# Patient Record
Sex: FEMALE | Race: WHITE | NOT HISPANIC OR LATINO | Employment: OTHER | URBAN - METROPOLITAN AREA
[De-identification: names, ages, dates, MRNs, and addresses within clinical notes are randomized per-mention and may not be internally consistent; named-entity substitution may affect disease eponyms.]

---

## 2023-03-13 DIAGNOSIS — G62.9 NEUROPATHY: Primary | ICD-10-CM

## 2023-03-13 RX ORDER — GABAPENTIN 300 MG/1
300 CAPSULE ORAL 3 TIMES DAILY
COMMUNITY
End: 2023-03-13 | Stop reason: SDUPTHER

## 2023-03-13 RX ORDER — CHLORHEXIDINE GLUCONATE ORAL RINSE 1.2 MG/ML
SOLUTION DENTAL
COMMUNITY
Start: 2022-08-31 | End: 2023-10-04 | Stop reason: ALTCHOICE

## 2023-03-13 RX ORDER — EPINEPHRINE 0.3 MG/.3ML
INJECTION SUBCUTANEOUS
COMMUNITY
End: 2023-09-11 | Stop reason: SDUPTHER

## 2023-03-13 RX ORDER — LOSARTAN POTASSIUM AND HYDROCHLOROTHIAZIDE 25; 100 MG/1; MG/1
1 TABLET ORAL DAILY
COMMUNITY
End: 2023-09-20

## 2023-03-13 RX ORDER — ALBUTEROL SULFATE 90 UG/1
AEROSOL, METERED RESPIRATORY (INHALATION)
COMMUNITY
Start: 2020-11-04 | End: 2023-05-25 | Stop reason: SDUPTHER

## 2023-03-13 RX ORDER — METOPROLOL TARTRATE 25 MG/1
1 TABLET, FILM COATED ORAL 2 TIMES DAILY
COMMUNITY
Start: 2019-01-30 | End: 2023-05-01

## 2023-03-13 RX ORDER — LEVOTHYROXINE SODIUM 125 UG/1
1 TABLET ORAL DAILY
COMMUNITY
Start: 2013-07-19 | End: 2023-09-11 | Stop reason: SDUPTHER

## 2023-03-13 RX ORDER — LOSARTAN POTASSIUM 100 MG/1
1 TABLET ORAL DAILY
COMMUNITY
Start: 2022-07-10 | End: 2023-08-28

## 2023-03-13 RX ORDER — MELOXICAM 15 MG/1
1 TABLET ORAL DAILY
COMMUNITY
Start: 2022-12-26 | End: 2023-08-09

## 2023-03-13 RX ORDER — MINERAL OIL
1 ENEMA (ML) RECTAL DAILY
COMMUNITY
Start: 2014-02-03 | End: 2023-08-28 | Stop reason: SDUPTHER

## 2023-03-13 RX ORDER — PANTOPRAZOLE SODIUM 40 MG/1
40 TABLET, DELAYED RELEASE ORAL DAILY
COMMUNITY
End: 2023-05-25 | Stop reason: SDUPTHER

## 2023-03-13 RX ORDER — MECLIZINE HYDROCHLORIDE 25 MG/1
1 TABLET ORAL 3 TIMES DAILY
COMMUNITY
Start: 2021-09-07 | End: 2024-05-21 | Stop reason: SDUPTHER

## 2023-03-13 RX ORDER — DIAZEPAM 5 MG/1
TABLET ORAL
COMMUNITY
Start: 2022-11-19 | End: 2023-06-19 | Stop reason: ALTCHOICE

## 2023-03-13 RX ORDER — ERENUMAB-AOOE 140 MG/ML
INJECTION, SOLUTION SUBCUTANEOUS
COMMUNITY
Start: 2023-02-16 | End: 2023-10-04 | Stop reason: ALTCHOICE

## 2023-03-13 RX ORDER — ONDANSETRON 4 MG/1
TABLET, ORALLY DISINTEGRATING ORAL
COMMUNITY
Start: 2013-08-08 | End: 2023-10-05 | Stop reason: SDUPTHER

## 2023-03-13 RX ORDER — ERGOCALCIFEROL 1.25 MG/1
1 CAPSULE ORAL 2 TIMES DAILY
COMMUNITY
End: 2023-10-29 | Stop reason: HOSPADM

## 2023-03-13 RX ORDER — DOCUSATE SODIUM 100 MG
1 CAPSULE ORAL 2 TIMES DAILY
COMMUNITY
Start: 2022-03-17 | End: 2023-08-28

## 2023-03-13 RX ORDER — GABAPENTIN 300 MG/1
300 CAPSULE ORAL 3 TIMES DAILY
Qty: 90 CAPSULE | Refills: 0 | Status: SHIPPED | OUTPATIENT
Start: 2023-03-13 | End: 2024-04-17 | Stop reason: ALTCHOICE

## 2023-03-13 RX ORDER — DIPHENHYDRAMINE HCL 25 MG
TABLET ORAL
COMMUNITY
Start: 2015-01-12 | End: 2023-10-29 | Stop reason: HOSPADM

## 2023-05-08 ENCOUNTER — OFFICE VISIT (OUTPATIENT)
Dept: PRIMARY CARE | Facility: CLINIC | Age: 73
End: 2023-05-08
Payer: MEDICARE

## 2023-05-08 ENCOUNTER — LAB (OUTPATIENT)
Dept: LAB | Facility: LAB | Age: 73
End: 2023-05-08
Payer: MEDICARE

## 2023-05-08 VITALS
BODY MASS INDEX: 39.44 KG/M2 | SYSTOLIC BLOOD PRESSURE: 140 MMHG | HEIGHT: 64 IN | WEIGHT: 231 LBS | DIASTOLIC BLOOD PRESSURE: 80 MMHG

## 2023-05-08 DIAGNOSIS — E78.5 HYPERLIPIDEMIA, UNSPECIFIED HYPERLIPIDEMIA TYPE: ICD-10-CM

## 2023-05-08 DIAGNOSIS — E55.9 VITAMIN D DEFICIENCY: ICD-10-CM

## 2023-05-08 DIAGNOSIS — E56.9 VITAMIN DEFICIENCY: ICD-10-CM

## 2023-05-08 DIAGNOSIS — E03.9 HYPOTHYROIDISM, UNSPECIFIED TYPE: ICD-10-CM

## 2023-05-08 DIAGNOSIS — I10 HYPERTENSION, UNSPECIFIED TYPE: ICD-10-CM

## 2023-05-08 PROCEDURE — 82306 VITAMIN D 25 HYDROXY: CPT

## 2023-05-08 PROCEDURE — 99214 OFFICE O/P EST MOD 30 MIN: CPT | Performed by: INTERNAL MEDICINE

## 2023-05-08 PROCEDURE — 3077F SYST BP >= 140 MM HG: CPT | Performed by: INTERNAL MEDICINE

## 2023-05-08 PROCEDURE — 80053 COMPREHEN METABOLIC PANEL: CPT

## 2023-05-08 PROCEDURE — 85025 COMPLETE CBC W/AUTO DIFF WBC: CPT

## 2023-05-08 PROCEDURE — 82607 VITAMIN B-12: CPT

## 2023-05-08 PROCEDURE — 36415 COLL VENOUS BLD VENIPUNCTURE: CPT

## 2023-05-08 PROCEDURE — 85652 RBC SED RATE AUTOMATED: CPT

## 2023-05-08 PROCEDURE — 84443 ASSAY THYROID STIM HORMONE: CPT

## 2023-05-08 PROCEDURE — 3079F DIAST BP 80-89 MM HG: CPT | Performed by: INTERNAL MEDICINE

## 2023-05-08 PROCEDURE — 80061 LIPID PANEL: CPT

## 2023-05-08 NOTE — PROGRESS NOTES
OFFICE NOTE    NAME OF THE PATIENT: Gretta Wilks     YOB: 1950    CHIEF COMPLAINT:  Ms. Wilks today came here for multiple medical issues.  She told me left shoulder pian is not better.  She had a surgery done.  As a sequelae of that she got arthritis, very difficult to cope.  She is in a lot of pain.  She had a right knee surgery.  That was a success.  She wants to have ______ blood work.  She is seeing Dr. Rolle.  She wants me to order ESR.  Appetite and weight are okay.  She came for follow-up.    PAST MEDICAL HISTORY:  Reviewed on EMR, unchanged.    CURRENT MEDICATIONS:  Reviewed on EMR, unchanged.    ALLERGIES:  Reviewed on EMR, unchanged.    SOCIAL HISTORY:  Reviewed on EMR, unchanged.  She does not smoke.    FAMILY HISTORY:  Reviewed on EMR, unchanged.    REVIEW OF SYSTEMS:  All 12 systems reviewed and pertaining covered in history and physical.    PHYSICAL EXAMINATION  VITAL SIGNS:  As recorded and reviewed from EMR.  RESPIRATORY:  The patient had normal inspirations and expirations.  The breath sounds were equal bilaterally and clear to auscultation.  CARDIOVASCULAR:  The patient had S1 normal, split S2 without obvious rubs, clicks, or murmurs.    GASTROINTESTINAL:  There was no hepatosplenomegaly.  There were no palpable masses and no inguinal nodes.  EXTREMITIES:  Legs had no edema.  Left shoulder swelling and tenderness.  NEUROLOGIC:  The patient had normal cranial nerves.  The reflexes, sensory, and motor examination were grossly within normal limits.    LAB WORK:  Laboratory testing discussed.    ASSESSMENT AND PLAN:  Shoulder pain.  She is seeing specialist.  Hypertension.  Kidney okay.  Cholesterol.  Monitor.  Arthritis.  Blood work ordered.  Connective tissue disorder.  ESR ordered.  Follow-up appointment with me in a week after testing.  Breast cancer follow-up.  Referred to Dr. Brunilda Calvo.    Kindly review this note in conjunction with EMR.     Subjective   Patient ID:  "Gretta Wilks is a 73 y.o. female who presents for Follow-up.      HPI    Review of Systems    Objective   /80   Ht 1.626 m (5' 4\")   Wt 105 kg (231 lb)   BMI 39.65 kg/m²       Physical Exam    Assessment/Plan   Problem List Items Addressed This Visit    None        "

## 2023-05-09 LAB
ALANINE AMINOTRANSFERASE (SGPT) (U/L) IN SER/PLAS: 14 U/L (ref 7–45)
ALBUMIN (G/DL) IN SER/PLAS: 3.9 G/DL (ref 3.4–5)
ALKALINE PHOSPHATASE (U/L) IN SER/PLAS: 59 U/L (ref 33–136)
ANION GAP IN SER/PLAS: 11 MMOL/L (ref 10–20)
ASPARTATE AMINOTRANSFERASE (SGOT) (U/L) IN SER/PLAS: 15 U/L (ref 9–39)
BASOPHILS (10*3/UL) IN BLOOD BY AUTOMATED COUNT: 0.07 X10E9/L (ref 0–0.1)
BASOPHILS/100 LEUKOCYTES IN BLOOD BY AUTOMATED COUNT: 0.6 % (ref 0–2)
BILIRUBIN TOTAL (MG/DL) IN SER/PLAS: 0.4 MG/DL (ref 0–1.2)
CALCIDIOL (25 OH VITAMIN D3) (NG/ML) IN SER/PLAS: 65 NG/ML
CALCIUM (MG/DL) IN SER/PLAS: 9.8 MG/DL (ref 8.6–10.6)
CARBON DIOXIDE, TOTAL (MMOL/L) IN SER/PLAS: 31 MMOL/L (ref 21–32)
CHLORIDE (MMOL/L) IN SER/PLAS: 103 MMOL/L (ref 98–107)
CHOLESTEROL (MG/DL) IN SER/PLAS: 241 MG/DL (ref 0–199)
CHOLESTEROL IN HDL (MG/DL) IN SER/PLAS: 42.7 MG/DL
CHOLESTEROL/HDL RATIO: 5.6
COBALAMIN (VITAMIN B12) (PG/ML) IN SER/PLAS: 737 PG/ML (ref 211–911)
CREATININE (MG/DL) IN SER/PLAS: 1.36 MG/DL (ref 0.5–1.05)
EOSINOPHILS (10*3/UL) IN BLOOD BY AUTOMATED COUNT: 0.24 X10E9/L (ref 0–0.4)
EOSINOPHILS/100 LEUKOCYTES IN BLOOD BY AUTOMATED COUNT: 2.1 % (ref 0–6)
ERYTHROCYTE DISTRIBUTION WIDTH (RATIO) BY AUTOMATED COUNT: 12.8 % (ref 11.5–14.5)
ERYTHROCYTE MEAN CORPUSCULAR HEMOGLOBIN CONCENTRATION (G/DL) BY AUTOMATED: 31.6 G/DL (ref 32–36)
ERYTHROCYTE MEAN CORPUSCULAR VOLUME (FL) BY AUTOMATED COUNT: 94 FL (ref 80–100)
ERYTHROCYTES (10*6/UL) IN BLOOD BY AUTOMATED COUNT: 4.29 X10E12/L (ref 4–5.2)
GFR FEMALE: 41 ML/MIN/1.73M2
GLUCOSE (MG/DL) IN SER/PLAS: 103 MG/DL (ref 74–99)
HEMATOCRIT (%) IN BLOOD BY AUTOMATED COUNT: 40.5 % (ref 36–46)
HEMOGLOBIN (G/DL) IN BLOOD: 12.8 G/DL (ref 12–16)
IMMATURE GRANULOCYTES/100 LEUKOCYTES IN BLOOD BY AUTOMATED COUNT: 0.5 % (ref 0–0.9)
LDL: 150 MG/DL (ref 0–99)
LEUKOCYTES (10*3/UL) IN BLOOD BY AUTOMATED COUNT: 11.2 X10E9/L (ref 4.4–11.3)
LYMPHOCYTES (10*3/UL) IN BLOOD BY AUTOMATED COUNT: 2.23 X10E9/L (ref 0.8–3)
LYMPHOCYTES/100 LEUKOCYTES IN BLOOD BY AUTOMATED COUNT: 19.8 % (ref 13–44)
MONOCYTES (10*3/UL) IN BLOOD BY AUTOMATED COUNT: 1.01 X10E9/L (ref 0.05–0.8)
MONOCYTES/100 LEUKOCYTES IN BLOOD BY AUTOMATED COUNT: 9 % (ref 2–10)
NEUTROPHILS (10*3/UL) IN BLOOD BY AUTOMATED COUNT: 7.63 X10E9/L (ref 1.6–5.5)
NEUTROPHILS/100 LEUKOCYTES IN BLOOD BY AUTOMATED COUNT: 68 % (ref 40–80)
NON HDL CHOLESTEROL: 198 MG/DL
NRBC (PER 100 WBCS) BY AUTOMATED COUNT: 0 /100 WBC (ref 0–0)
PLATELETS (10*3/UL) IN BLOOD AUTOMATED COUNT: 414 X10E9/L (ref 150–450)
POTASSIUM (MMOL/L) IN SER/PLAS: 4.1 MMOL/L (ref 3.5–5.3)
PROTEIN TOTAL: 7.1 G/DL (ref 6.4–8.2)
SEDIMENTATION RATE, ERYTHROCYTE: 24 MM/H (ref 0–30)
SODIUM (MMOL/L) IN SER/PLAS: 141 MMOL/L (ref 136–145)
THYROTROPIN (MIU/L) IN SER/PLAS BY DETECTION LIMIT <= 0.05 MIU/L: 0.39 MIU/L (ref 0.44–3.98)
TRIGLYCERIDE (MG/DL) IN SER/PLAS: 242 MG/DL (ref 0–149)
UREA NITROGEN (MG/DL) IN SER/PLAS: 21 MG/DL (ref 6–23)
VLDL: 48 MG/DL (ref 0–40)

## 2023-05-25 ENCOUNTER — OFFICE VISIT (OUTPATIENT)
Dept: PRIMARY CARE | Facility: CLINIC | Age: 73
End: 2023-05-25
Payer: MEDICARE

## 2023-05-25 VITALS
SYSTOLIC BLOOD PRESSURE: 126 MMHG | DIASTOLIC BLOOD PRESSURE: 72 MMHG | WEIGHT: 235 LBS | BODY MASS INDEX: 40.12 KG/M2 | HEIGHT: 64 IN

## 2023-05-25 DIAGNOSIS — R73.03 PREDIABETES: ICD-10-CM

## 2023-05-25 DIAGNOSIS — E78.5 HYPERLIPIDEMIA, UNSPECIFIED HYPERLIPIDEMIA TYPE: ICD-10-CM

## 2023-05-25 DIAGNOSIS — Z87.898 H/O BRAIN TUMOR: ICD-10-CM

## 2023-05-25 DIAGNOSIS — R53.0 NEOPLASTIC MALIGNANT RELATED FATIGUE: ICD-10-CM

## 2023-05-25 DIAGNOSIS — T78.40XA ALLERGY, INITIAL ENCOUNTER: ICD-10-CM

## 2023-05-25 DIAGNOSIS — E03.9 HYPOTHYROIDISM, UNSPECIFIED TYPE: ICD-10-CM

## 2023-05-25 DIAGNOSIS — K21.9 GASTROESOPHAGEAL REFLUX DISEASE WITHOUT ESOPHAGITIS: ICD-10-CM

## 2023-05-25 PROBLEM — H25.13 AGE-RELATED NUCLEAR CATARACT OF BOTH EYES: Status: ACTIVE | Noted: 2023-05-15

## 2023-05-25 PROBLEM — R50.9 FEVER: Status: ACTIVE | Noted: 2023-05-25

## 2023-05-25 PROBLEM — J45.909 EXTRINSIC ASTHMA WITHOUT COMPLICATION (HHS-HCC): Status: ACTIVE | Noted: 2023-05-25

## 2023-05-25 PROBLEM — M25.612 DECREASED ROM OF LEFT SHOULDER: Status: ACTIVE | Noted: 2023-05-25

## 2023-05-25 PROBLEM — M54.9 BACK PAIN: Status: ACTIVE | Noted: 2023-05-25

## 2023-05-25 PROBLEM — S42.402A CLOSED FRACTURE OF LEFT DISTAL HUMERUS: Status: ACTIVE | Noted: 2023-05-25

## 2023-05-25 PROBLEM — B35.1 NAIL FUNGUS: Status: ACTIVE | Noted: 2023-05-25

## 2023-05-25 PROBLEM — R07.9 CHEST PAIN: Status: ACTIVE | Noted: 2023-05-25

## 2023-05-25 PROBLEM — R42 DIZZINESS: Status: ACTIVE | Noted: 2023-05-25

## 2023-05-25 PROBLEM — R60.9 EDEMA: Status: ACTIVE | Noted: 2023-05-25

## 2023-05-25 PROBLEM — I10 PRIMARY HYPERTENSION: Status: ACTIVE | Noted: 2022-03-15

## 2023-05-25 PROBLEM — M25.562 BILATERAL KNEE PAIN: Status: ACTIVE | Noted: 2022-10-17

## 2023-05-25 PROBLEM — G56.02 CARPAL TUNNEL SYNDROME ON LEFT: Status: ACTIVE | Noted: 2022-04-01

## 2023-05-25 PROBLEM — M25.561 BILATERAL KNEE PAIN: Status: ACTIVE | Noted: 2022-10-17

## 2023-05-25 PROBLEM — L03.90 CELLULITIS: Status: ACTIVE | Noted: 2023-05-25

## 2023-05-25 PROBLEM — R11.0 NAUSEA IN ADULT: Status: ACTIVE | Noted: 2023-05-25

## 2023-05-25 PROBLEM — M25.579 ANKLE PAIN: Status: ACTIVE | Noted: 2023-05-25

## 2023-05-25 PROBLEM — G56.32 NEUROPATHY OF LEFT RADIAL NERVE: Status: ACTIVE | Noted: 2023-05-25

## 2023-05-25 PROBLEM — G43.719 CHRONIC MIGRAINE WITHOUT AURA, INTRACTABLE, WITHOUT STATUS MIGRAINOSUS: Status: ACTIVE | Noted: 2021-03-01

## 2023-05-25 PROBLEM — H93.12 TINNITUS OF LEFT EAR: Status: ACTIVE | Noted: 2023-05-25

## 2023-05-25 PROBLEM — M25.622 DECREASED ROM OF LEFT ELBOW: Status: ACTIVE | Noted: 2023-05-25

## 2023-05-25 PROBLEM — S42.209A PROXIMAL HUMERUS FRACTURE: Status: ACTIVE | Noted: 2023-05-25

## 2023-05-25 PROBLEM — E55.9 VITAMIN D DEFICIENCY: Status: ACTIVE | Noted: 2023-05-25

## 2023-05-25 PROBLEM — R26.89 IMBALANCE: Status: ACTIVE | Noted: 2021-03-01

## 2023-05-25 PROBLEM — R20.2 NUMBNESS AND TINGLING IN LEFT HAND: Status: ACTIVE | Noted: 2022-04-01

## 2023-05-25 PROBLEM — R05.9 COUGH: Status: ACTIVE | Noted: 2023-05-25

## 2023-05-25 PROBLEM — S83.519A TORN ACL: Status: ACTIVE | Noted: 2023-05-25

## 2023-05-25 PROBLEM — N60.99 ATYPICAL DUCTAL HYPERPLASIA OF BREAST: Status: ACTIVE | Noted: 2017-02-08

## 2023-05-25 PROBLEM — S69.91XA INJURY OF HAND, RIGHT: Status: ACTIVE | Noted: 2023-05-25

## 2023-05-25 PROBLEM — R26.9 ABNORMALITY OF GAIT: Status: ACTIVE | Noted: 2021-11-22

## 2023-05-25 PROBLEM — M79.89 LEG SWELLING: Status: ACTIVE | Noted: 2023-05-25

## 2023-05-25 PROBLEM — R11.2 PONV (POSTOPERATIVE NAUSEA AND VOMITING): Status: ACTIVE | Noted: 2022-03-17

## 2023-05-25 PROBLEM — F41.9 ANXIETY: Status: ACTIVE | Noted: 2023-05-25

## 2023-05-25 PROBLEM — M19.019 PRIMARY LOCALIZED OSTEOARTHROSIS OF SHOULDER REGION: Status: ACTIVE | Noted: 2023-05-25

## 2023-05-25 PROBLEM — M53.0 CERVICOCRANIAL SYNDROME: Status: ACTIVE | Noted: 2021-03-01

## 2023-05-25 PROBLEM — J20.9 ACUTE BRONCHITIS: Status: ACTIVE | Noted: 2023-05-25

## 2023-05-25 PROBLEM — N32.81 OVERACTIVE BLADDER: Status: ACTIVE | Noted: 2023-05-25

## 2023-05-25 PROBLEM — Z98.890 HISTORY OF LUMPECTOMY OF RIGHT BREAST: Status: ACTIVE | Noted: 2017-02-08

## 2023-05-25 PROBLEM — R35.0 URINE FREQUENCY: Status: ACTIVE | Noted: 2023-05-25

## 2023-05-25 PROBLEM — S42.342A CLOSED DISPLACED SPIRAL FRACTURE OF SHAFT OF LEFT HUMERUS: Status: ACTIVE | Noted: 2023-05-25

## 2023-05-25 PROBLEM — R23.2 HOT FLASHES: Status: ACTIVE | Noted: 2023-05-25

## 2023-05-25 PROBLEM — H66.90 EAR INFECTION: Status: ACTIVE | Noted: 2023-05-25

## 2023-05-25 PROBLEM — R51.9 HEADACHE: Status: ACTIVE | Noted: 2023-05-25

## 2023-05-25 PROBLEM — R92.8 ABNORMAL MAMMOGRAM: Status: ACTIVE | Noted: 2023-05-25

## 2023-05-25 PROBLEM — H93.19 EAR RINGING: Status: ACTIVE | Noted: 2023-05-25

## 2023-05-25 PROBLEM — M25.512 LEFT SHOULDER PAIN: Status: ACTIVE | Noted: 2023-05-25

## 2023-05-25 PROBLEM — G93.9 LESION OF BRAIN: Status: ACTIVE | Noted: 2023-05-25

## 2023-05-25 PROBLEM — M19.041 LOCALIZED PRIMARY OSTEOARTHRITIS OF RIGHT HAND: Status: ACTIVE | Noted: 2023-05-25

## 2023-05-25 PROBLEM — L23.89 ALLERGIC CONTACT DERMATITIS DUE TO OTHER AGENTS: Status: ACTIVE | Noted: 2023-05-25

## 2023-05-25 PROBLEM — G44.319 ACUTE POST-TRAUMATIC HEADACHE, NOT INTRACTABLE: Status: ACTIVE | Noted: 2023-05-25

## 2023-05-25 PROBLEM — R20.0 NUMBNESS AND TINGLING IN LEFT HAND: Status: ACTIVE | Noted: 2022-04-01

## 2023-05-25 PROBLEM — Z98.890 PONV (POSTOPERATIVE NAUSEA AND VOMITING): Status: ACTIVE | Noted: 2022-03-17

## 2023-05-25 PROBLEM — M17.11 ARTHRITIS OF KNEE, RIGHT: Status: ACTIVE | Noted: 2023-05-25

## 2023-05-25 PROBLEM — R06.00 DYSPNEA: Status: ACTIVE | Noted: 2023-05-25

## 2023-05-25 PROBLEM — M72.2 PLANTAR FASCIITIS, LEFT: Status: ACTIVE | Noted: 2023-05-25

## 2023-05-25 PROBLEM — R21 RASH: Status: ACTIVE | Noted: 2023-05-25

## 2023-05-25 PROBLEM — S42.301A FRACTURE OF RIGHT HUMERUS: Status: ACTIVE | Noted: 2023-05-25

## 2023-05-25 PROBLEM — M84.376A METATARSAL STRESS FRACTURE: Status: ACTIVE | Noted: 2023-05-25

## 2023-05-25 PROBLEM — M79.89 ARM SWELLING: Status: ACTIVE | Noted: 2023-05-25

## 2023-05-25 PROBLEM — R53.83 FATIGUE: Status: ACTIVE | Noted: 2023-05-25

## 2023-05-25 PROBLEM — M18.12 ARTHRITIS OF CARPOMETACARPAL (CMC) JOINT OF LEFT THUMB: Status: ACTIVE | Noted: 2023-05-25

## 2023-05-25 PROBLEM — D18.00 HEMANGIOMA: Status: ACTIVE | Noted: 2023-05-25

## 2023-05-25 PROBLEM — N39.0 UTI (URINARY TRACT INFECTION): Status: ACTIVE | Noted: 2023-05-25

## 2023-05-25 PROCEDURE — 3074F SYST BP LT 130 MM HG: CPT | Performed by: INTERNAL MEDICINE

## 2023-05-25 PROCEDURE — 1159F MED LIST DOCD IN RCRD: CPT | Performed by: INTERNAL MEDICINE

## 2023-05-25 PROCEDURE — 99214 OFFICE O/P EST MOD 30 MIN: CPT | Performed by: INTERNAL MEDICINE

## 2023-05-25 PROCEDURE — 3078F DIAST BP <80 MM HG: CPT | Performed by: INTERNAL MEDICINE

## 2023-05-25 RX ORDER — ALBUTEROL SULFATE 90 UG/1
2 AEROSOL, METERED RESPIRATORY (INHALATION)
Qty: 18 G | Refills: 3 | Status: SHIPPED | OUTPATIENT
Start: 2023-05-25 | End: 2024-05-21 | Stop reason: SDUPTHER

## 2023-05-25 RX ORDER — METHYLPREDNISOLONE 4 MG/1
TABLET ORAL
Qty: 21 TABLET | Refills: 0 | Status: SHIPPED | OUTPATIENT
Start: 2023-05-25 | End: 2023-06-01

## 2023-05-25 RX ORDER — PANTOPRAZOLE SODIUM 40 MG/1
40 TABLET, DELAYED RELEASE ORAL DAILY
Qty: 90 TABLET | Refills: 0 | Status: SHIPPED | OUTPATIENT
Start: 2023-05-25 | End: 2023-10-04 | Stop reason: SDUPTHER

## 2023-05-25 NOTE — PROGRESS NOTES
OFFICE NOTE    NAME OF THE PATIENT: Gretta Wilks     YOB: 1950    CHIEF COMPLAINT:  Ms. Glynn today came here for multiple medical issues.  She is very frustrated.  Her arthritis has flared up, a lot of inflammation.  She is looking for new rheumatologist.  Currently, she is on no medication.  She is worried with meningioma and headache.  There is no follow-up for last two years.  Follow-up on other conditions.  Appetite and weight are okay.  No problem.    PAST MEDICAL HISTORY:  Reviewed on EMR, unchanged.    CURRENT MEDICATIONS:  Reviewed on EMR, unchanged.    ALLERGIES:  Reviewed on EMR, unchanged.    SOCIAL HISTORY:  Reviewed on EMR, unchanged.    FAMILY HISTORY:  Reviewed on EMR, unchanged.    REVIEW OF SYSTEMS:  All 12 systems reviewed and pertaining covered in history and physical.    PHYSICAL EXAMINATION  VITAL SIGNS:  As recorded and reviewed from EMR.  RESPIRATORY:  The patient had normal inspirations and expirations.  The breath sounds were equal bilaterally and clear to auscultation.  CARDIOVASCULAR:  The patient had S1 normal, split S2 without obvious rubs, clicks, or murmurs.    GASTROINTESTINAL:  There was no hepatosplenomegaly.  There were no palpable masses and no inguinal nodes.  EXTREMITIES:  Legs had no edema.  NEUROLOGIC:  The patient had normal cranial nerves.  The reflexes, sensory, and motor examination were grossly within normal limits.    LAB WORK:  Laboratory testing discussed.    ASSESSMENT AND PLAN:  Meningioma.  Immediate MRI ordered.  Renal insufficiency.  Monitor.  Rheumatic disease.  Referred to Dr. Sadie Rubin.  Hypertension, okay.  High cholesterol, stable.  Pre-diabetic.  Monitor.  Follow-up in July with repeat blood work.  I offered assistance to get along with Dr. Sadie Rubin for appointment.  She was patient in same practice when Dr Allen was there.  That does help.    Kindly review this note in conjunction with EMR.   Subjective   Patient ID: Gretta Wilks is  "a 73 y.o. female who presents for Follow-up.      HPI    Review of Systems    Objective   /72   Ht 1.626 m (5' 4\")   Wt 107 kg (235 lb)   BMI 40.34 kg/m²       Physical Exam    Assessment/Plan   Problem List Items Addressed This Visit          Digestive    GERD (gastroesophageal reflux disease)       Other    Allergy         "

## 2023-06-19 ENCOUNTER — OFFICE VISIT (OUTPATIENT)
Dept: PRIMARY CARE | Facility: CLINIC | Age: 73
End: 2023-06-19
Payer: MEDICARE

## 2023-06-19 VITALS
HEIGHT: 66 IN | BODY MASS INDEX: 37.77 KG/M2 | WEIGHT: 235 LBS | SYSTOLIC BLOOD PRESSURE: 124 MMHG | DIASTOLIC BLOOD PRESSURE: 66 MMHG

## 2023-06-19 DIAGNOSIS — N63.0 BREAST MASS IN FEMALE: Primary | ICD-10-CM

## 2023-06-19 DIAGNOSIS — Z12.31 ENCOUNTER FOR SCREENING MAMMOGRAM FOR BREAST CANCER: ICD-10-CM

## 2023-06-19 DIAGNOSIS — R21 RASH: ICD-10-CM

## 2023-06-19 PROBLEM — S99.929A PLANTAR PLATE INJURY: Status: ACTIVE | Noted: 2023-06-19

## 2023-06-19 PROBLEM — M17.12 ARTHRITIS OF KNEE, LEFT: Status: ACTIVE | Noted: 2023-06-19

## 2023-06-19 PROBLEM — M54.9 MID BACK PAIN: Status: ACTIVE | Noted: 2023-06-19

## 2023-06-19 PROBLEM — R53.1 FEELING WEAK: Status: ACTIVE | Noted: 2023-06-19

## 2023-06-19 PROBLEM — M89.8X2 PAIN OF LEFT HUMERUS: Status: ACTIVE | Noted: 2023-06-19

## 2023-06-19 PROBLEM — M20.5X9 CLAWTOE, ACQUIRED: Status: ACTIVE | Noted: 2023-06-19

## 2023-06-19 PROBLEM — B37.9 YEAST INFECTION: Status: ACTIVE | Noted: 2023-06-19

## 2023-06-19 PROBLEM — M25.561 CHRONIC PAIN OF RIGHT KNEE: Status: ACTIVE | Noted: 2023-06-19

## 2023-06-19 PROBLEM — M79.642 PAIN OF LEFT HAND: Status: ACTIVE | Noted: 2023-06-19

## 2023-06-19 PROBLEM — I10 HYPERTENSION: Status: ACTIVE | Noted: 2023-06-19

## 2023-06-19 PROBLEM — M54.50 LOWER BACK PAIN: Status: ACTIVE | Noted: 2023-06-19

## 2023-06-19 PROBLEM — I80.9 PHLEBITIS: Status: ACTIVE | Noted: 2023-06-19

## 2023-06-19 PROBLEM — G43.109 MIGRAINE WITH AURA AND WITHOUT STATUS MIGRAINOSUS, NOT INTRACTABLE: Status: ACTIVE | Noted: 2023-06-19

## 2023-06-19 PROBLEM — M19.049 HAND ARTHRITIS: Status: ACTIVE | Noted: 2023-06-19

## 2023-06-19 PROBLEM — M79.673 FOOT PAIN: Status: ACTIVE | Noted: 2023-06-19

## 2023-06-19 PROBLEM — J45.909 ASTHMA (HHS-HCC): Status: ACTIVE | Noted: 2023-06-19

## 2023-06-19 PROBLEM — G56.02 CARPAL TUNNEL SYNDROME OF LEFT WRIST: Status: ACTIVE | Noted: 2023-06-19

## 2023-06-19 PROBLEM — B99.9 ACUTE INFECTION: Status: ACTIVE | Noted: 2023-06-19

## 2023-06-19 PROBLEM — R39.9 UTI SYMPTOMS: Status: ACTIVE | Noted: 2023-06-19

## 2023-06-19 PROBLEM — M79.604 RIGHT LEG PAIN: Status: ACTIVE | Noted: 2023-06-19

## 2023-06-19 PROBLEM — G89.29 CHRONIC PAIN OF RIGHT KNEE: Status: ACTIVE | Noted: 2023-06-19

## 2023-06-19 PROBLEM — M79.603 UPPER EXTREMITY PAIN: Status: ACTIVE | Noted: 2023-06-19

## 2023-06-19 PROBLEM — R22.1 NECK SWELLING: Status: ACTIVE | Noted: 2023-06-19

## 2023-06-19 PROBLEM — H43.811 VITREOUS DETACHMENT OF RIGHT EYE: Status: ACTIVE | Noted: 2023-05-15

## 2023-06-19 PROBLEM — M54.2 NECK PAIN: Status: ACTIVE | Noted: 2021-03-01

## 2023-06-19 PROCEDURE — 3078F DIAST BP <80 MM HG: CPT | Performed by: INTERNAL MEDICINE

## 2023-06-19 PROCEDURE — 1036F TOBACCO NON-USER: CPT | Performed by: INTERNAL MEDICINE

## 2023-06-19 PROCEDURE — 3008F BODY MASS INDEX DOCD: CPT | Performed by: INTERNAL MEDICINE

## 2023-06-19 PROCEDURE — 99213 OFFICE O/P EST LOW 20 MIN: CPT | Performed by: INTERNAL MEDICINE

## 2023-06-19 PROCEDURE — 3074F SYST BP LT 130 MM HG: CPT | Performed by: INTERNAL MEDICINE

## 2023-06-19 PROCEDURE — 1159F MED LIST DOCD IN RCRD: CPT | Performed by: INTERNAL MEDICINE

## 2023-06-19 RX ORDER — DOXYCYCLINE 100 MG/1
100 CAPSULE ORAL DAILY
Qty: 30 CAPSULE | Refills: 0 | Status: SHIPPED | OUTPATIENT
Start: 2023-06-19 | End: 2023-07-19

## 2023-06-19 NOTE — PROGRESS NOTES
"Subjective   Patient ID: Gretta Wilks is a 73 y.o. female who presents for Follow-up and Med Refill.    Med Refill    Patient is here concerned about having mass in the right wrist.  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  She has finished 5-year monitoring and now she is worried about noticing the mass in the right breast  She also has rash in the neck and sternal area    Review of Systems    Objective   /66   Ht 1.676 m (5' 6\")   Wt 107 kg (235 lb)   BMI 37.93 kg/m²     Physical Exam  Vitals reviewed.   Constitutional:       Appearance: Normal appearance.   HENT:      Head: Normocephalic and atraumatic.      Right Ear: Tympanic membrane, ear canal and external ear normal.      Left Ear: Tympanic membrane, ear canal and external ear normal.      Nose: Nose normal.      Mouth/Throat:      Pharynx: Oropharynx is clear.   Eyes:      Extraocular Movements: Extraocular movements intact.      Conjunctiva/sclera: Conjunctivae normal.      Pupils: Pupils are equal, round, and reactive to light.   Cardiovascular:      Rate and Rhythm: Normal rate and regular rhythm.      Pulses: Normal pulses.      Heart sounds: Normal heart sounds.   Pulmonary:      Effort: Pulmonary effort is normal.      Breath sounds: Normal breath sounds.   Abdominal:      General: Abdomen is flat. Bowel sounds are normal.      Palpations: Abdomen is soft.   Musculoskeletal:      Cervical back: Normal range of motion and neck supple.   Skin:     General: Skin is warm and dry.      Findings: Rash present.   Neurological:      General: No focal deficit present.      Mental Status: She is alert and oriented to person, place, and time.   Psychiatric:         Mood and Affect: Mood normal.         Assessment/Plan   Problem List Items Addressed This Visit          Other    Rash    Relevant Medications    doxycycline (Vibramycin) 100 mg capsule     Other Visit Diagnoses       Encounter for screening mammogram " for breast cancer        Relevant Orders    BI mammo bilateral screening tomosynthesis        Mass felt in the right breast most likely scar  We will get diagnostic mammogram  Folliculitis in the neck area we will treat with doxycycline

## 2023-06-25 DIAGNOSIS — I10 PRIMARY HYPERTENSION: ICD-10-CM

## 2023-06-26 RX ORDER — METOPROLOL TARTRATE 25 MG/1
TABLET, FILM COATED ORAL
Qty: 180 TABLET | Refills: 3 | Status: SHIPPED | OUTPATIENT
Start: 2023-06-26

## 2023-07-05 ENCOUNTER — TELEPHONE (OUTPATIENT)
Dept: PRIMARY CARE | Facility: CLINIC | Age: 73
End: 2023-07-05
Payer: MEDICARE

## 2023-08-09 ENCOUNTER — OFFICE VISIT (OUTPATIENT)
Dept: PRIMARY CARE | Facility: CLINIC | Age: 73
End: 2023-08-09
Payer: MEDICARE

## 2023-08-09 VITALS
HEIGHT: 66 IN | DIASTOLIC BLOOD PRESSURE: 76 MMHG | SYSTOLIC BLOOD PRESSURE: 128 MMHG | BODY MASS INDEX: 37.45 KG/M2 | WEIGHT: 233 LBS

## 2023-08-09 DIAGNOSIS — R05.9 COUGH, UNSPECIFIED TYPE: ICD-10-CM

## 2023-08-09 PROCEDURE — 99214 OFFICE O/P EST MOD 30 MIN: CPT | Performed by: INTERNAL MEDICINE

## 2023-08-09 PROCEDURE — 1159F MED LIST DOCD IN RCRD: CPT | Performed by: INTERNAL MEDICINE

## 2023-08-09 PROCEDURE — 3008F BODY MASS INDEX DOCD: CPT | Performed by: INTERNAL MEDICINE

## 2023-08-09 PROCEDURE — 1036F TOBACCO NON-USER: CPT | Performed by: INTERNAL MEDICINE

## 2023-08-09 PROCEDURE — 3074F SYST BP LT 130 MM HG: CPT | Performed by: INTERNAL MEDICINE

## 2023-08-09 PROCEDURE — 3078F DIAST BP <80 MM HG: CPT | Performed by: INTERNAL MEDICINE

## 2023-08-09 PROCEDURE — 1125F AMNT PAIN NOTED PAIN PRSNT: CPT | Performed by: INTERNAL MEDICINE

## 2023-08-09 RX ORDER — DOXYCYCLINE 100 MG/1
100 CAPSULE ORAL 2 TIMES DAILY
Qty: 20 CAPSULE | Refills: 0 | Status: SHIPPED | OUTPATIENT
Start: 2023-08-09 | End: 2023-08-19

## 2023-08-09 RX ORDER — MONTELUKAST SODIUM 10 MG/1
10 TABLET ORAL NIGHTLY
Qty: 30 TABLET | Refills: 5 | Status: SHIPPED | OUTPATIENT
Start: 2023-08-09 | End: 2023-08-28 | Stop reason: SDUPTHER

## 2023-08-09 RX ORDER — BUDESONIDE AND FORMOTEROL FUMARATE DIHYDRATE 80; 4.5 UG/1; UG/1
2 AEROSOL RESPIRATORY (INHALATION)
Qty: 6.9 G | Refills: 0 | Status: SHIPPED | OUTPATIENT
Start: 2023-08-09 | End: 2023-10-29 | Stop reason: HOSPADM

## 2023-08-09 RX ORDER — PREDNISONE 10 MG/1
TABLET ORAL
Qty: 42 TABLET | Refills: 0 | Status: SHIPPED | OUTPATIENT
Start: 2023-08-09 | End: 2023-08-21

## 2023-08-09 RX ORDER — LORATADINE 10 MG/1
10 TABLET ORAL DAILY
Qty: 30 TABLET | Refills: 2 | Status: SHIPPED | OUTPATIENT
Start: 2023-08-09 | End: 2023-10-29 | Stop reason: HOSPADM

## 2023-08-09 ASSESSMENT — ENCOUNTER SYMPTOMS
LOSS OF SENSATION IN FEET: 0
OCCASIONAL FEELINGS OF UNSTEADINESS: 0
DEPRESSION: 0
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
## 2023-08-15 ENCOUNTER — LAB (OUTPATIENT)
Dept: LAB | Facility: LAB | Age: 73
End: 2023-08-15
Payer: MEDICARE

## 2023-08-15 DIAGNOSIS — E03.9 HYPOTHYROIDISM, UNSPECIFIED TYPE: ICD-10-CM

## 2023-08-15 DIAGNOSIS — R53.0 NEOPLASTIC MALIGNANT RELATED FATIGUE: ICD-10-CM

## 2023-08-15 DIAGNOSIS — E78.5 HYPERLIPIDEMIA, UNSPECIFIED HYPERLIPIDEMIA TYPE: ICD-10-CM

## 2023-08-15 DIAGNOSIS — R73.03 PREDIABETES: ICD-10-CM

## 2023-08-15 LAB
ALANINE AMINOTRANSFERASE (SGPT) (U/L) IN SER/PLAS: 14 U/L (ref 7–45)
ALBUMIN (G/DL) IN SER/PLAS: 3.8 G/DL (ref 3.4–5)
ALKALINE PHOSPHATASE (U/L) IN SER/PLAS: 55 U/L (ref 33–136)
ANION GAP IN SER/PLAS: 13 MMOL/L (ref 10–20)
ASPARTATE AMINOTRANSFERASE (SGOT) (U/L) IN SER/PLAS: 12 U/L (ref 9–39)
BASOPHILS (10*3/UL) IN BLOOD BY AUTOMATED COUNT: 0.12 X10E9/L (ref 0–0.1)
BASOPHILS/100 LEUKOCYTES IN BLOOD BY AUTOMATED COUNT: 0.8 % (ref 0–2)
BILIRUBIN TOTAL (MG/DL) IN SER/PLAS: 0.4 MG/DL (ref 0–1.2)
CALCIUM (MG/DL) IN SER/PLAS: 9.4 MG/DL (ref 8.6–10.6)
CARBON DIOXIDE, TOTAL (MMOL/L) IN SER/PLAS: 29 MMOL/L (ref 21–32)
CHLORIDE (MMOL/L) IN SER/PLAS: 102 MMOL/L (ref 98–107)
CHOLESTEROL (MG/DL) IN SER/PLAS: 210 MG/DL (ref 0–199)
CHOLESTEROL IN HDL (MG/DL) IN SER/PLAS: 49.9 MG/DL
CHOLESTEROL/HDL RATIO: 4.2
CREATININE (MG/DL) IN SER/PLAS: 1.35 MG/DL (ref 0.5–1.05)
EOSINOPHILS (10*3/UL) IN BLOOD BY AUTOMATED COUNT: 0.27 X10E9/L (ref 0–0.4)
EOSINOPHILS/100 LEUKOCYTES IN BLOOD BY AUTOMATED COUNT: 1.8 % (ref 0–6)
ERYTHROCYTE DISTRIBUTION WIDTH (RATIO) BY AUTOMATED COUNT: 12.2 % (ref 11.5–14.5)
ERYTHROCYTE MEAN CORPUSCULAR HEMOGLOBIN CONCENTRATION (G/DL) BY AUTOMATED: 31.3 G/DL (ref 32–36)
ERYTHROCYTE MEAN CORPUSCULAR VOLUME (FL) BY AUTOMATED COUNT: 95 FL (ref 80–100)
ERYTHROCYTES (10*6/UL) IN BLOOD BY AUTOMATED COUNT: 4.38 X10E12/L (ref 4–5.2)
ESTIMATED AVERAGE GLUCOSE FOR HBA1C: 111 MG/DL
GFR FEMALE: 42 ML/MIN/1.73M2
GLUCOSE (MG/DL) IN SER/PLAS: 87 MG/DL (ref 74–99)
HEMATOCRIT (%) IN BLOOD BY AUTOMATED COUNT: 41.5 % (ref 36–46)
HEMOGLOBIN (G/DL) IN BLOOD: 13 G/DL (ref 12–16)
HEMOGLOBIN A1C/HEMOGLOBIN TOTAL IN BLOOD: 5.5 %
IMMATURE GRANULOCYTES/100 LEUKOCYTES IN BLOOD BY AUTOMATED COUNT: 0.4 % (ref 0–0.9)
LDL: 138 MG/DL (ref 0–99)
LEUKOCYTES (10*3/UL) IN BLOOD BY AUTOMATED COUNT: 15.4 X10E9/L (ref 4.4–11.3)
LYMPHOCYTES (10*3/UL) IN BLOOD BY AUTOMATED COUNT: 4.9 X10E9/L (ref 0.8–3)
LYMPHOCYTES/100 LEUKOCYTES IN BLOOD BY AUTOMATED COUNT: 31.8 % (ref 13–44)
MONOCYTES (10*3/UL) IN BLOOD BY AUTOMATED COUNT: 1.21 X10E9/L (ref 0.05–0.8)
MONOCYTES/100 LEUKOCYTES IN BLOOD BY AUTOMATED COUNT: 7.9 % (ref 2–10)
NEUTROPHILS (10*3/UL) IN BLOOD BY AUTOMATED COUNT: 8.84 X10E9/L (ref 1.6–5.5)
NEUTROPHILS/100 LEUKOCYTES IN BLOOD BY AUTOMATED COUNT: 57.3 % (ref 40–80)
NRBC (PER 100 WBCS) BY AUTOMATED COUNT: 0 /100 WBC (ref 0–0)
PLATELETS (10*3/UL) IN BLOOD AUTOMATED COUNT: 413 X10E9/L (ref 150–450)
POTASSIUM (MMOL/L) IN SER/PLAS: 4.2 MMOL/L (ref 3.5–5.3)
PROTEIN TOTAL: 7.1 G/DL (ref 6.4–8.2)
SODIUM (MMOL/L) IN SER/PLAS: 140 MMOL/L (ref 136–145)
THYROTROPIN (MIU/L) IN SER/PLAS BY DETECTION LIMIT <= 0.05 MIU/L: 1.7 MIU/L (ref 0.44–3.98)
TRIGLYCERIDE (MG/DL) IN SER/PLAS: 112 MG/DL (ref 0–149)
UREA NITROGEN (MG/DL) IN SER/PLAS: 30 MG/DL (ref 6–23)
VLDL: 22 MG/DL (ref 0–40)

## 2023-08-15 PROCEDURE — 80053 COMPREHEN METABOLIC PANEL: CPT

## 2023-08-15 PROCEDURE — 83036 HEMOGLOBIN GLYCOSYLATED A1C: CPT

## 2023-08-15 PROCEDURE — 80061 LIPID PANEL: CPT

## 2023-08-15 PROCEDURE — 85025 COMPLETE CBC W/AUTO DIFF WBC: CPT

## 2023-08-15 PROCEDURE — 84443 ASSAY THYROID STIM HORMONE: CPT

## 2023-08-15 PROCEDURE — 36415 COLL VENOUS BLD VENIPUNCTURE: CPT

## 2023-08-17 ENCOUNTER — TELEPHONE (OUTPATIENT)
Dept: PRIMARY CARE | Facility: CLINIC | Age: 73
End: 2023-08-17
Payer: MEDICARE

## 2023-08-17 DIAGNOSIS — R05.9 COUGH, UNSPECIFIED TYPE: ICD-10-CM

## 2023-08-17 RX ORDER — MOMETASONE FUROATE AND FORMOTEROL FUMARATE DIHYDRATE 100; 5 UG/1; UG/1
2 AEROSOL RESPIRATORY (INHALATION)
Qty: 13 G | Refills: 5 | Status: SHIPPED | OUTPATIENT
Start: 2023-08-17 | End: 2023-10-04 | Stop reason: ALTCHOICE

## 2023-08-17 NOTE — TELEPHONE ENCOUNTER
----- Message from Angela Bañuelos MA sent at 8/17/2023 11:24 AM EDT -----  I called this pharmacy about the prednisone and they said the symbicort was not o]covered by insurance but dulera is. Do you want to send in dulera or do a pa for the symbicort?

## 2023-08-28 ENCOUNTER — OFFICE VISIT (OUTPATIENT)
Dept: PRIMARY CARE | Facility: CLINIC | Age: 73
End: 2023-08-28
Payer: MEDICARE

## 2023-08-28 VITALS
SYSTOLIC BLOOD PRESSURE: 128 MMHG | WEIGHT: 233 LBS | BODY MASS INDEX: 37.45 KG/M2 | HEIGHT: 66 IN | DIASTOLIC BLOOD PRESSURE: 76 MMHG

## 2023-08-28 DIAGNOSIS — R05.9 COUGH, UNSPECIFIED TYPE: ICD-10-CM

## 2023-08-28 DIAGNOSIS — I10 PRIMARY HYPERTENSION: ICD-10-CM

## 2023-08-28 DIAGNOSIS — J40 BRONCHITIS: ICD-10-CM

## 2023-08-28 PROCEDURE — 99214 OFFICE O/P EST MOD 30 MIN: CPT | Performed by: INTERNAL MEDICINE

## 2023-08-28 PROCEDURE — 3008F BODY MASS INDEX DOCD: CPT | Performed by: INTERNAL MEDICINE

## 2023-08-28 PROCEDURE — 1125F AMNT PAIN NOTED PAIN PRSNT: CPT | Performed by: INTERNAL MEDICINE

## 2023-08-28 PROCEDURE — 1036F TOBACCO NON-USER: CPT | Performed by: INTERNAL MEDICINE

## 2023-08-28 PROCEDURE — 1159F MED LIST DOCD IN RCRD: CPT | Performed by: INTERNAL MEDICINE

## 2023-08-28 PROCEDURE — 3078F DIAST BP <80 MM HG: CPT | Performed by: INTERNAL MEDICINE

## 2023-08-28 PROCEDURE — 3074F SYST BP LT 130 MM HG: CPT | Performed by: INTERNAL MEDICINE

## 2023-08-28 RX ORDER — MONTELUKAST SODIUM 10 MG/1
10 TABLET ORAL NIGHTLY
Qty: 30 TABLET | Refills: 3 | Status: SHIPPED | OUTPATIENT
Start: 2023-08-28 | End: 2023-09-11 | Stop reason: SDUPTHER

## 2023-08-28 RX ORDER — MINERAL OIL
180 ENEMA (ML) RECTAL DAILY
Qty: 30 TABLET | Refills: 3 | Status: SHIPPED | OUTPATIENT
Start: 2023-08-28 | End: 2024-05-21 | Stop reason: WASHOUT

## 2023-08-28 RX ORDER — PREDNISONE 10 MG/1
TABLET ORAL
Qty: 42 TABLET | Refills: 0 | Status: SHIPPED | OUTPATIENT
Start: 2023-08-28 | End: 2023-09-09

## 2023-08-28 RX ORDER — AMLODIPINE BESYLATE 5 MG/1
5 TABLET ORAL DAILY
Qty: 30 TABLET | Refills: 5 | Status: SHIPPED | OUTPATIENT
Start: 2023-08-28 | End: 2023-10-04 | Stop reason: ALTCHOICE

## 2023-08-28 RX ORDER — CYCLOBENZAPRINE HCL 10 MG
10 TABLET ORAL 2 TIMES DAILY PRN
Qty: 60 TABLET | Refills: 2 | Status: CANCELLED | OUTPATIENT
Start: 2023-08-28

## 2023-08-28 ASSESSMENT — ENCOUNTER SYMPTOMS
OCCASIONAL FEELINGS OF UNSTEADINESS: 0
LOSS OF SENSATION IN FEET: 0
DEPRESSION: 0

## 2023-08-28 NOTE — PROGRESS NOTES
OFFICE NOTE    NAME OF THE PATIENT: Gretta Wilks    YOB: 1950    CHIEF COMPLAINT:   Ms. Glynn has chronic cough, congestion, shortness of breath, wheezing, not better.  She is still having symptoms.  She is not happy.  She took doxycycline, no improvement.  She is getting angioedema.  She constantly has allergies until January.  She is worried about angioedema.  She knows how to treat it.  It is a significant problem all the time.  Congestion and cough, not feeling good.    PAST MEDICAL HISTORY:  Reviewed on EMR, unchanged.    CURRENT MEDICATIONS:  Reviewed on EMR, unchanged.  List reviewed.    ALLERGIES:  Reviewed on EMR, unchanged.    SOCIAL HISTORY:  Reviewed on EMR, unchanged.  She does not smoke and does not drink alcohol.    FAMILY HISTORY:  Reviewed on EMR, unchanged.    REVIEW OF SYSTEMS:  All 12 systems reviewed and pertaining covered in history and physical.    PHYSICAL EXAMINATION  VITAL SIGNS:  As recorded and reviewed from EMR.  EYES:  The patient's sclerae white.  The pupils were equal and round.  ENT:  Nose and throat congested.  Postnasal drip.  RESPIRATORY: Wheezing present.  ______.  CARDIOVASCULAR:  The patient had S1 normal, split S2 without obvious rubs, clicks, or murmurs.    GASTROINTESTINAL:  There was no hepatosplenomegaly.  There were no palpable masses and no inguinal nodes.  EXTREMITIES:  Legs had no edema.  NEUROLOGIC:  The patient had normal cranial nerves.  The reflexes, sensory, and motor examination were grossly within normal limits.    LAB WORK: Laboratory testing discussed.    ASSESSMENT AND PLAN:  Bronchitis, and cough condition.  No improvement.  Chest x-ray, Z-SAMANTA, continue inhaler.  Question of angioedema.  Prednisone, Singulair, Allegra.  Hypertension.  Losartan could be contributing to angioedema, though Dr. Gibson got rid of losartan and hydrochlorothiazide, but we are going to get rid of losartan completely.  Instead for blood pressure we will try  "Norvasc 5 mg.  Leg edema, it can happen, explained to her.  Cardiac.  Ejection fraction of 55%.  ______ okay.  ______ okay.  Follow-up appointment with me in two weeks.  I will check repeated kidney function and chronic renal failure.  I think losartan and hydrochlorothiazide are contributing to it.  I will just keep an eye on it.    Kindly review this note in conjunction with EMR.   Subjective   Patient ID: Gretta Wilks is a 73 y.o. female who presents for Follow-up.      HPI    Review of Systems    Objective   /76   Ht 1.676 m (5' 6\")   Wt 106 kg (233 lb)   BMI 37.61 kg/m²       Physical Exam    Assessment/Plan   Problem List Items Addressed This Visit    None        "

## 2023-08-31 DIAGNOSIS — J40 BRONCHITIS: ICD-10-CM

## 2023-08-31 RX ORDER — AZITHROMYCIN 250 MG/1
TABLET, FILM COATED ORAL
Qty: 6 TABLET | Refills: 0 | Status: SHIPPED | OUTPATIENT
Start: 2023-08-31 | End: 2023-09-05

## 2023-09-01 PROBLEM — R05.9 COUGH: Status: RESOLVED | Noted: 2023-05-25 | Resolved: 2023-09-01

## 2023-09-01 PROBLEM — M79.604 RIGHT LEG PAIN: Status: RESOLVED | Noted: 2023-06-19 | Resolved: 2023-09-01

## 2023-09-01 PROBLEM — R50.9 FEVER: Status: RESOLVED | Noted: 2023-05-25 | Resolved: 2023-09-01

## 2023-09-01 PROBLEM — M17.0 PRIMARY OSTEOARTHRITIS OF KNEES, BILATERAL: Status: ACTIVE | Noted: 2023-09-01

## 2023-09-01 PROBLEM — M35.9 AUTOIMMUNE DISEASE (MULTI): Status: ACTIVE | Noted: 2023-09-01

## 2023-09-01 PROBLEM — M35.01 SJOGREN SYNDROME WITH KERATOCONJUNCTIVITIS (MULTI): Status: ACTIVE | Noted: 2023-09-01

## 2023-09-01 PROBLEM — J20.9 ACUTE BRONCHITIS: Status: RESOLVED | Noted: 2023-05-25 | Resolved: 2023-09-01

## 2023-09-01 PROBLEM — M65.9 TENOSYNOVITIS OF RIGHT HAND: Status: ACTIVE | Noted: 2023-09-01

## 2023-09-01 PROBLEM — N39.0 UTI (URINARY TRACT INFECTION): Status: RESOLVED | Noted: 2023-05-25 | Resolved: 2023-09-01

## 2023-09-01 PROBLEM — I73.9 CLAUDICATION (CMS-HCC): Status: ACTIVE | Noted: 2023-09-01

## 2023-09-01 PROBLEM — G44.319 ACUTE POST-TRAUMATIC HEADACHE, NOT INTRACTABLE: Status: RESOLVED | Noted: 2023-05-25 | Resolved: 2023-09-01
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
## 2023-09-01 PROBLEM — Z79.899 MEDICATION MANAGEMENT: Status: ACTIVE | Noted: 2023-09-01

## 2023-09-01 PROBLEM — E66.812 CLASS 2 OBESITY WITH BODY MASS INDEX (BMI) OF 36.0 TO 36.9 IN ADULT: Status: ACTIVE | Noted: 2023-09-01

## 2023-09-01 PROBLEM — M54.9 BACK PAIN: Status: RESOLVED | Noted: 2023-05-25 | Resolved: 2023-09-01

## 2023-09-01 PROBLEM — M79.642 PAIN OF LEFT HAND: Status: RESOLVED | Noted: 2023-06-19 | Resolved: 2023-09-01

## 2023-09-01 PROBLEM — Z78.0 POST-MENOPAUSAL: Status: ACTIVE | Noted: 2023-09-01

## 2023-09-01 PROBLEM — M79.673 FOOT PAIN: Status: RESOLVED | Noted: 2023-06-19 | Resolved: 2023-09-01

## 2023-09-01 PROBLEM — M79.7 FIBROMYALGIA SYNDROME: Status: ACTIVE | Noted: 2023-09-01

## 2023-09-01 PROBLEM — M25.512 LEFT SHOULDER PAIN: Status: RESOLVED | Noted: 2023-05-25 | Resolved: 2023-09-01

## 2023-09-01 PROBLEM — E06.3 AUTOIMMUNE THYROIDITIS: Status: ACTIVE | Noted: 2023-09-01

## 2023-09-01 PROBLEM — M25.561 BILATERAL KNEE PAIN: Status: RESOLVED | Noted: 2022-10-17 | Resolved: 2023-09-01

## 2023-09-01 PROBLEM — L03.119 CELLULITIS AND ABSCESS OF LOWER EXTREMITY: Status: ACTIVE | Noted: 2023-09-01

## 2023-09-01 PROBLEM — M54.50 LOWER BACK PAIN: Status: RESOLVED | Noted: 2023-06-19 | Resolved: 2023-09-01

## 2023-09-01 PROBLEM — R26.81 UNSTEADY GAIT: Status: ACTIVE | Noted: 2023-09-01

## 2023-09-01 PROBLEM — M17.11 PRIMARY OSTEOARTHRITIS OF RIGHT KNEE: Status: ACTIVE | Noted: 2023-09-01

## 2023-09-01 PROBLEM — M24.612: Status: ACTIVE | Noted: 2023-09-01

## 2023-09-01 PROBLEM — Z86.2 HISTORY OF PERNICIOUS ANEMIA: Status: ACTIVE | Noted: 2023-09-01

## 2023-09-01 PROBLEM — M47.812 CERVICAL SPONDYLOSIS: Status: ACTIVE | Noted: 2023-09-01

## 2023-09-01 PROBLEM — M54.9 MID BACK PAIN: Status: RESOLVED | Noted: 2023-06-19 | Resolved: 2023-09-01

## 2023-09-01 PROBLEM — M65.941 TENOSYNOVITIS OF RIGHT HAND: Status: ACTIVE | Noted: 2023-09-01

## 2023-09-01 PROBLEM — M54.2 NECK PAIN: Status: RESOLVED | Noted: 2021-03-01 | Resolved: 2023-09-01

## 2023-09-01 PROBLEM — L02.419 CELLULITIS AND ABSCESS OF LOWER EXTREMITY: Status: ACTIVE | Noted: 2023-09-01

## 2023-09-01 PROBLEM — H66.90 EAR INFECTION: Status: RESOLVED | Noted: 2023-05-25 | Resolved: 2023-09-01

## 2023-09-01 PROBLEM — R60.0 EDEMA OF UPPER EXTREMITY: Status: ACTIVE | Noted: 2023-05-25

## 2023-09-01 PROBLEM — M25.562 BILATERAL KNEE PAIN: Status: RESOLVED | Noted: 2022-10-17 | Resolved: 2023-09-01

## 2023-09-01 PROBLEM — B99.9 ACUTE INFECTION: Status: RESOLVED | Noted: 2023-06-19 | Resolved: 2023-09-01

## 2023-09-01 PROBLEM — M15.0 PRIMARY GENERALIZED HYPERTROPHIC OSTEOARTHROSIS: Status: ACTIVE | Noted: 2023-09-01

## 2023-09-01 PROBLEM — E66.9 CLASS 2 OBESITY WITH BODY MASS INDEX (BMI) OF 36.0 TO 36.9 IN ADULT: Status: ACTIVE | Noted: 2023-09-01

## 2023-09-01 PROBLEM — M79.89 ARM SWELLING: Status: ACTIVE | Noted: 2023-09-01

## 2023-09-01 PROBLEM — M19.042 PRIMARY OSTEOARTHRITIS, LEFT HAND: Status: ACTIVE | Noted: 2023-09-01

## 2023-09-01 PROBLEM — R51.9 HEADACHE: Status: RESOLVED | Noted: 2023-05-25 | Resolved: 2023-09-01

## 2023-09-01 PROBLEM — M32.9 SYSTEMIC LUPUS ERYTHEMATOSUS (MULTI): Status: ACTIVE | Noted: 2023-09-01

## 2023-09-01 PROBLEM — M79.603 UPPER EXTREMITY PAIN: Status: RESOLVED | Noted: 2023-06-19 | Resolved: 2023-09-01

## 2023-09-01 PROBLEM — R11.0 NAUSEA IN ADULT: Status: RESOLVED | Noted: 2023-05-25 | Resolved: 2023-09-01

## 2023-09-01 PROBLEM — R76.0 ANTINUCLEAR ANTIBODY (ANA) TITER GREATER THAN 1:80: Status: ACTIVE | Noted: 2023-09-01

## 2023-09-01 RX ORDER — RIMEGEPANT SULFATE 75 MG/75MG
75 TABLET, ORALLY DISINTEGRATING ORAL
COMMUNITY
Start: 2023-08-28 | End: 2023-10-04 | Stop reason: ALTCHOICE

## 2023-09-01 RX ORDER — BUTALBITAL, ACETAMINOPHEN AND CAFFEINE 50; 325; 40 MG/1; MG/1; MG/1
2 TABLET ORAL DAILY PRN
COMMUNITY
End: 2023-10-05 | Stop reason: SDUPTHER

## 2023-09-11 ENCOUNTER — OFFICE VISIT (OUTPATIENT)
Dept: PRIMARY CARE | Facility: CLINIC | Age: 73
End: 2023-09-11
Payer: MEDICARE

## 2023-09-11 VITALS — DIASTOLIC BLOOD PRESSURE: 78 MMHG | SYSTOLIC BLOOD PRESSURE: 130 MMHG | BODY MASS INDEX: 37.61 KG/M2 | HEIGHT: 66 IN

## 2023-09-11 DIAGNOSIS — E78.5 HYPERLIPIDEMIA, UNSPECIFIED HYPERLIPIDEMIA TYPE: ICD-10-CM

## 2023-09-11 DIAGNOSIS — J32.9 SINUSITIS, UNSPECIFIED CHRONICITY, UNSPECIFIED LOCATION: ICD-10-CM

## 2023-09-11 DIAGNOSIS — R05.9 COUGH, UNSPECIFIED TYPE: ICD-10-CM

## 2023-09-11 DIAGNOSIS — T78.40XA ALLERGY, INITIAL ENCOUNTER: ICD-10-CM

## 2023-09-11 DIAGNOSIS — E03.9 HYPOTHYROIDISM, UNSPECIFIED TYPE: ICD-10-CM

## 2023-09-11 DIAGNOSIS — I10 PRIMARY HYPERTENSION: ICD-10-CM

## 2023-09-11 DIAGNOSIS — R60.9 EDEMA, UNSPECIFIED TYPE: ICD-10-CM

## 2023-09-11 DIAGNOSIS — T78.3XXD ANGIOEDEMA, SUBSEQUENT ENCOUNTER: Primary | ICD-10-CM

## 2023-09-11 PROBLEM — R26.9 ABNORMALITY OF GAIT: Status: RESOLVED | Noted: 2021-11-22 | Resolved: 2023-09-11

## 2023-09-11 PROBLEM — N32.81 OVERACTIVE BLADDER: Status: RESOLVED | Noted: 2023-05-25 | Resolved: 2023-09-11

## 2023-09-11 PROBLEM — R23.2 HOT FLASHES: Status: RESOLVED | Noted: 2023-05-25 | Resolved: 2023-09-11

## 2023-09-11 PROCEDURE — 99214 OFFICE O/P EST MOD 30 MIN: CPT | Performed by: INTERNAL MEDICINE

## 2023-09-11 PROCEDURE — 3078F DIAST BP <80 MM HG: CPT | Performed by: INTERNAL MEDICINE

## 2023-09-11 PROCEDURE — 1125F AMNT PAIN NOTED PAIN PRSNT: CPT | Performed by: INTERNAL MEDICINE

## 2023-09-11 PROCEDURE — 1036F TOBACCO NON-USER: CPT | Performed by: INTERNAL MEDICINE

## 2023-09-11 PROCEDURE — 1159F MED LIST DOCD IN RCRD: CPT | Performed by: INTERNAL MEDICINE

## 2023-09-11 PROCEDURE — 3008F BODY MASS INDEX DOCD: CPT | Performed by: INTERNAL MEDICINE

## 2023-09-11 PROCEDURE — 3075F SYST BP GE 130 - 139MM HG: CPT | Performed by: INTERNAL MEDICINE

## 2023-09-11 RX ORDER — CLINDAMYCIN HYDROCHLORIDE 300 MG/1
300 CAPSULE ORAL 2 TIMES DAILY
Qty: 20 CAPSULE | Refills: 0 | Status: SHIPPED | OUTPATIENT
Start: 2023-09-11 | End: 2023-09-21

## 2023-09-11 RX ORDER — LEVOTHYROXINE SODIUM 125 UG/1
125 TABLET ORAL DAILY
Qty: 90 TABLET | Refills: 1 | Status: SHIPPED | OUTPATIENT
Start: 2023-09-11 | End: 2024-04-17 | Stop reason: SDUPTHER

## 2023-09-11 RX ORDER — EPINEPHRINE 0.3 MG/.3ML
INJECTION SUBCUTANEOUS
Qty: 10 EACH | Refills: 0 | Status: SHIPPED | OUTPATIENT
Start: 2023-09-11 | End: 2023-10-29 | Stop reason: HOSPADM

## 2023-09-11 RX ORDER — MONTELUKAST SODIUM 10 MG/1
10 TABLET ORAL NIGHTLY
Qty: 30 TABLET | Refills: 3 | Status: SHIPPED | OUTPATIENT
Start: 2023-09-11 | End: 2024-03-09

## 2023-09-11 RX ORDER — POTASSIUM CHLORIDE 20 MEQ/1
20 TABLET, EXTENDED RELEASE ORAL 2 TIMES DAILY
Qty: 60 TABLET | Refills: 5 | Status: SHIPPED | OUTPATIENT
Start: 2023-09-11 | End: 2023-10-29 | Stop reason: HOSPADM

## 2023-09-11 RX ORDER — LANOLIN ALCOHOL/MO/W.PET/CERES
400 CREAM (GRAM) TOPICAL DAILY
Qty: 90 TABLET | Refills: 1 | Status: SHIPPED | OUTPATIENT
Start: 2023-09-11 | End: 2024-09-10

## 2023-09-11 RX ORDER — FUROSEMIDE 40 MG/1
40 TABLET ORAL DAILY
Qty: 30 TABLET | Refills: 5 | Status: SHIPPED | OUTPATIENT
Start: 2023-09-11 | End: 2024-04-17 | Stop reason: ALTCHOICE

## 2023-09-11 ASSESSMENT — ENCOUNTER SYMPTOMS
OCCASIONAL FEELINGS OF UNSTEADINESS: 0
LOSS OF SENSATION IN FEET: 0
DEPRESSION: 0

## 2023-09-11 NOTE — PROGRESS NOTES
"Subjective   Patient ID: Gretta Wilks is a 73 y.o. female who presents for Follow-up.      HPI    Review of Systems    Objective   /78   Ht 1.676 m (5' 6\")   BMI 37.61 kg/m²       Physical Exam    Assessment/Plan   Problem List Items Addressed This Visit    None        "

## 2023-09-11 NOTE — PROGRESS NOTES
"Subjective   Patient ID: Gretta Wilks is a 73 y.o. female who presents for Follow-up.    HPI   Ms. Wilks today came here for multiple medical issues.  1. She told me she gets leg swelling at the end of the day.  Lasix is not helping.  2. She is getting frequent attacks of angioedema.  Wednesday she had appointment with allergist.  Her all the team of doctors who took care of her gone away.  She has stopped already losartan.  3. Follow-up on other conditions.  Appetite and weight are okay.  She came for follow-up on various conditions.    Review of Systems  The patient never had a stroke.  No heart attack.  No diabetes.  Cancer breast.  All 12 systems reviewed and pertaining covered in history and physical.    Objective   /78   Ht 1.676 m (5' 6\")   BMI 37.61 kg/m²     Physical Exam  EYES: The patient’s sclerae white.  The pupils were equal and round.  ENT: Throat congested.  Postnasal drip.  NECK: Supple.  There were no palpable masses.  Thyroid was not enlarged and there were no carotid bruits.  RESPIRATORY: The patient had normal inspirations and expirations.  The breath sounds were equal bilaterally and clear to auscultation.  CARDIOVASCULAR: The patient had S1 normal, split S2 without obvious rubs, clicks, or murmurs.  GASTROINTESTINAL: There was no hepatosplenomegaly.  There were no palpable masses and no inguinal nodes.  LYMPHATIC: The patient had no axillary, groin, or lymphadenopathy.  MUSCULOSKELETAL: The patient had normal gait.  The joints appeared to be normal without evidence of deformity.  Grossly the muscles had good range of motion and were without effusion.  EXTREMITIES: Legs had 1+ edema.  NEUROLOGIC: The patient had normal cranial nerves.  The reflexes, sensory, and motor examination were grossly within normal limits.  PSYCHIATRIC: The patient had normal judgment and insight.  The patient was oriented to person, place, and time, and had no obvious mood defect including depression, " anxiety, and/or agitation.    LAB WORK: Laboratory testing done discussed.    Assessment/Plan     1. Angioedema, frequent.  I added Singulair, gave her EpiPen prescription for that.  She is going to see allergist.  2. Sinusitis, postnasal drip, congestion.  Change antibiotic to Clindamycin.  3. Edema.  Lasix, potassium, magnesium given.  4. Hypertension, okay.  5. Cholesterol, stable.  6. She is going to see allergist.  I will monitor.  7. I shall see her back in a week.  8. Continue to follow.    Scribe Attestation  By signing my name below, I, Agusto Koo   attest that this documentation has been prepared under the direction and in the presence of Yarelis Blanco MD.

## 2023-09-13 LAB
BASOPHILS (10*3/UL) IN BLOOD BY AUTOMATED COUNT: 0.09 X10E9/L (ref 0–0.1)
BASOPHILS/100 LEUKOCYTES IN BLOOD BY AUTOMATED COUNT: 0.6 % (ref 0–2)
COMPLEMENT C4 (MG/DL) IN SER/PLAS: 41 MG/DL (ref 10–50)
EOSINOPHILS (10*3/UL) IN BLOOD BY AUTOMATED COUNT: 0.09 X10E9/L (ref 0–0.4)
EOSINOPHILS/100 LEUKOCYTES IN BLOOD BY AUTOMATED COUNT: 0.6 % (ref 0–6)
ERYTHROCYTE DISTRIBUTION WIDTH (RATIO) BY AUTOMATED COUNT: 12.9 % (ref 11.5–14.5)
ERYTHROCYTE MEAN CORPUSCULAR HEMOGLOBIN CONCENTRATION (G/DL) BY AUTOMATED: 31.7 G/DL (ref 32–36)
ERYTHROCYTE MEAN CORPUSCULAR VOLUME (FL) BY AUTOMATED COUNT: 94 FL (ref 80–100)
ERYTHROCYTES (10*6/UL) IN BLOOD BY AUTOMATED COUNT: 4.46 X10E12/L (ref 4–5.2)
HEMATOCRIT (%) IN BLOOD BY AUTOMATED COUNT: 42 % (ref 36–46)
HEMOGLOBIN (G/DL) IN BLOOD: 13.3 G/DL (ref 12–16)
IMMATURE GRANULOCYTES/100 LEUKOCYTES IN BLOOD BY AUTOMATED COUNT: 0.5 % (ref 0–0.9)
LEUKOCYTES (10*3/UL) IN BLOOD BY AUTOMATED COUNT: 15.4 X10E9/L (ref 4.4–11.3)
LYMPHOCYTES (10*3/UL) IN BLOOD BY AUTOMATED COUNT: 2.8 X10E9/L (ref 0.8–3)
LYMPHOCYTES/100 LEUKOCYTES IN BLOOD BY AUTOMATED COUNT: 18.2 % (ref 13–44)
MONOCYTES (10*3/UL) IN BLOOD BY AUTOMATED COUNT: 1.37 X10E9/L (ref 0.05–0.8)
MONOCYTES/100 LEUKOCYTES IN BLOOD BY AUTOMATED COUNT: 8.9 % (ref 2–10)
NEUTROPHILS (10*3/UL) IN BLOOD BY AUTOMATED COUNT: 10.95 X10E9/L (ref 1.6–5.5)
NEUTROPHILS/100 LEUKOCYTES IN BLOOD BY AUTOMATED COUNT: 71.2 % (ref 40–80)
NRBC (PER 100 WBCS) BY AUTOMATED COUNT: 0 /100 WBC (ref 0–0)
PLATELETS (10*3/UL) IN BLOOD AUTOMATED COUNT: 418 X10E9/L (ref 150–450)

## 2023-09-16 LAB — C1 ESTERASE INHIBITOR: 32 MG/DL (ref 21–38)

## 2023-09-18 LAB — C1 EST.INHIB.FUNCT.: 93 %

## 2023-09-20 ENCOUNTER — OFFICE VISIT (OUTPATIENT)
Dept: PRIMARY CARE | Facility: CLINIC | Age: 73
End: 2023-09-20
Payer: MEDICARE

## 2023-09-20 VITALS — SYSTOLIC BLOOD PRESSURE: 130 MMHG | DIASTOLIC BLOOD PRESSURE: 74 MMHG | HEIGHT: 66 IN | BODY MASS INDEX: 37.61 KG/M2

## 2023-09-20 DIAGNOSIS — T78.3XXA ANGIOEDEMA, INITIAL ENCOUNTER: Primary | ICD-10-CM

## 2023-09-20 DIAGNOSIS — I10 HYPERTENSION, UNSPECIFIED TYPE: ICD-10-CM

## 2023-09-20 DIAGNOSIS — E78.00 HIGH CHOLESTEROL: ICD-10-CM

## 2023-09-20 PROBLEM — M70.52 PES ANSERINUS BURSITIS OF LEFT KNEE: Status: ACTIVE | Noted: 2023-09-20

## 2023-09-20 PROCEDURE — 1159F MED LIST DOCD IN RCRD: CPT | Performed by: INTERNAL MEDICINE

## 2023-09-20 PROCEDURE — 3078F DIAST BP <80 MM HG: CPT | Performed by: INTERNAL MEDICINE

## 2023-09-20 PROCEDURE — 3008F BODY MASS INDEX DOCD: CPT | Performed by: INTERNAL MEDICINE

## 2023-09-20 PROCEDURE — 1036F TOBACCO NON-USER: CPT | Performed by: INTERNAL MEDICINE

## 2023-09-20 PROCEDURE — 3075F SYST BP GE 130 - 139MM HG: CPT | Performed by: INTERNAL MEDICINE

## 2023-09-20 PROCEDURE — 99213 OFFICE O/P EST LOW 20 MIN: CPT | Performed by: INTERNAL MEDICINE

## 2023-09-20 PROCEDURE — 1125F AMNT PAIN NOTED PAIN PRSNT: CPT | Performed by: INTERNAL MEDICINE

## 2023-09-20 ASSESSMENT — ENCOUNTER SYMPTOMS
DEPRESSION: 0
LOSS OF SENSATION IN FEET: 0
OCCASIONAL FEELINGS OF UNSTEADINESS: 0
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
## 2023-09-25 ENCOUNTER — LAB (OUTPATIENT)
Dept: LAB | Facility: LAB | Age: 73
End: 2023-09-25
Payer: MEDICARE

## 2023-09-25 ENCOUNTER — OFFICE VISIT (OUTPATIENT)
Dept: PRIMARY CARE | Facility: CLINIC | Age: 73
End: 2023-09-25
Payer: MEDICARE

## 2023-09-25 VITALS
HEIGHT: 66 IN | DIASTOLIC BLOOD PRESSURE: 76 MMHG | BODY MASS INDEX: 39.53 KG/M2 | SYSTOLIC BLOOD PRESSURE: 132 MMHG | WEIGHT: 246 LBS

## 2023-09-25 DIAGNOSIS — R60.1 GENERALIZED EDEMA: ICD-10-CM

## 2023-09-25 DIAGNOSIS — N28.9 RENAL INSUFFICIENCY: ICD-10-CM

## 2023-09-25 DIAGNOSIS — E78.5 HYPERLIPIDEMIA, UNSPECIFIED HYPERLIPIDEMIA TYPE: ICD-10-CM

## 2023-09-25 DIAGNOSIS — I10 PRIMARY HYPERTENSION: ICD-10-CM

## 2023-09-25 DIAGNOSIS — I50.9 EDEMA DUE TO CONGESTIVE HEART FAILURE (MULTI): ICD-10-CM

## 2023-09-25 DIAGNOSIS — I10 HYPERTENSION, UNSPECIFIED TYPE: ICD-10-CM

## 2023-09-25 DIAGNOSIS — I39 ENDOCARDITIS AND HEART VALVE DISORDERS IN DISEASES CLASSIFIED ELSEWHERE: ICD-10-CM

## 2023-09-25 DIAGNOSIS — E03.9 HYPOTHYROIDISM, UNSPECIFIED TYPE: ICD-10-CM

## 2023-09-25 DIAGNOSIS — R60.9 EDEMA, UNSPECIFIED TYPE: Primary | ICD-10-CM

## 2023-09-25 LAB
ANION GAP IN SER/PLAS: 12 MMOL/L (ref 10–20)
CALCIUM (MG/DL) IN SER/PLAS: 9.6 MG/DL (ref 8.6–10.6)
CARBON DIOXIDE, TOTAL (MMOL/L) IN SER/PLAS: 30 MMOL/L (ref 21–32)
CHLORIDE (MMOL/L) IN SER/PLAS: 102 MMOL/L (ref 98–107)
CREATININE (MG/DL) IN SER/PLAS: 1.42 MG/DL (ref 0.5–1.05)
GFR FEMALE: 39 ML/MIN/1.73M2
GLUCOSE (MG/DL) IN SER/PLAS: 93 MG/DL (ref 74–99)
POTASSIUM (MMOL/L) IN SER/PLAS: 4.3 MMOL/L (ref 3.5–5.3)
SODIUM (MMOL/L) IN SER/PLAS: 140 MMOL/L (ref 136–145)
UREA NITROGEN (MG/DL) IN SER/PLAS: 13 MG/DL (ref 6–23)

## 2023-09-25 PROCEDURE — 36415 COLL VENOUS BLD VENIPUNCTURE: CPT

## 2023-09-25 PROCEDURE — 99214 OFFICE O/P EST MOD 30 MIN: CPT | Performed by: INTERNAL MEDICINE

## 2023-09-25 PROCEDURE — 81001 URINALYSIS AUTO W/SCOPE: CPT

## 2023-09-25 PROCEDURE — 3075F SYST BP GE 130 - 139MM HG: CPT | Performed by: INTERNAL MEDICINE

## 2023-09-25 PROCEDURE — 1125F AMNT PAIN NOTED PAIN PRSNT: CPT | Performed by: INTERNAL MEDICINE

## 2023-09-25 PROCEDURE — 3078F DIAST BP <80 MM HG: CPT | Performed by: INTERNAL MEDICINE

## 2023-09-25 PROCEDURE — 1159F MED LIST DOCD IN RCRD: CPT | Performed by: INTERNAL MEDICINE

## 2023-09-25 PROCEDURE — 80048 BASIC METABOLIC PNL TOTAL CA: CPT

## 2023-09-25 PROCEDURE — 1036F TOBACCO NON-USER: CPT | Performed by: INTERNAL MEDICINE

## 2023-09-25 PROCEDURE — 3008F BODY MASS INDEX DOCD: CPT | Performed by: INTERNAL MEDICINE

## 2023-09-25 ASSESSMENT — ENCOUNTER SYMPTOMS
DEPRESSION: 0
LOSS OF SENSATION IN FEET: 0
OCCASIONAL FEELINGS OF UNSTEADINESS: 0

## 2023-09-25 NOTE — PROGRESS NOTES
"Subjective   Patient ID: Gretta Wilks is a 73 y.o. female who presents for Follow-up (swelling).    Ms. Glynn is very unhappy.  She has swelling all over the body, not better.  Lasix is not helping.  She is undergoing treatment for angioedema as well.  Feeling weak, tired, fatigued, exhausted, not feeling good.  She came for follow-up.    I have personally reviewed the patient's Past Medical History, Medications, Allergies, Social History, and Family History in the EMR.    Review of Systems   All other systems reviewed and are negative.    Objective   /76   Ht 1.676 m (5' 6\")   Wt 112 kg (246 lb)   BMI 39.71 kg/m²     Physical Exam  Vitals reviewed.   Constitutional:       Comments: Not feeling good.   Cardiovascular:      Heart sounds: Normal heart sounds, S1 normal and S2 normal. No murmur heard.     No friction rub.   Pulmonary:      Effort: Pulmonary effort is normal.      Breath sounds: Normal breath sounds and air entry.   Abdominal:      Palpations: There is no hepatomegaly, splenomegaly or mass.   Musculoskeletal:      Right lower leg: 3+ Edema present.      Left lower leg: 3+ Edema present.   Lymphadenopathy:      Lower Body: No right inguinal adenopathy. No left inguinal adenopathy.   Neurological:      Cranial Nerves: Cranial nerves 2-12 are intact.      Sensory: No sensory deficit.      Motor: Motor function is intact.      Deep Tendon Reflexes: Reflexes are normal and symmetric.     LAB WORK: Echocardiogram results discussed.    Assessment/Plan   Problem List Items Addressed This Visit             ICD-10-CM       Symptoms and Signs    Edema - Primary R60.9    Relevant Medications    torsemide 40 mg tablet    Other Relevant Orders    Transthoracic Echo (TTE) Complete    Comprehensive metabolic panel    B-type natriuretic peptide    Transthoracic Echo (TTE) Complete     Other Visit Diagnoses         Codes    Edema due to congestive heart failure (CMS/HCC)     I50.9    Relevant Orders    " B-type natriuretic peptide    Endocarditis and heart valve disorders in diseases classified elsewhere     I39    Relevant Orders    Transthoracic Echo (TTE) Complete    Renal insufficiency     N28.9        1. Edema, not responding.  I think Lasix is not going to work.  I am going to change to Demadex. Continue potassium.  Explained difference between Lasix and Demadex.  She might need IV Lasix.  2. Renal insufficiency.  I am going to monitor kidney function.  3. Possibility of new onset heart failure, I am not sure.  We will order 2D echo.  Monitor.  4, Angioedema, it could be angioedema, but she is under treatment already.  5. I shall see her back in a week.  If it gets worse, I will admit her.    Scribe Attestation  By signing my name below, I, Amada Avila, Agusto   attest that this documentation has been prepared under the direction and in the presence of Yarelis Blanco MD.

## 2023-09-26 PROBLEM — N28.9 RENAL INSUFFICIENCY: Status: ACTIVE | Noted: 2023-09-26

## 2023-09-26 PROBLEM — I39 ENDOCARDITIS AND HEART VALVE DISORDERS IN DISEASES CLASSIFIED ELSEWHERE: Status: ACTIVE | Noted: 2023-09-26

## 2023-09-26 LAB
APPEARANCE, URINE: ABNORMAL
BACTERIA, URINE: ABNORMAL /HPF
BILIRUBIN, URINE: NEGATIVE
BLOOD, URINE: NEGATIVE
BUDDING YEAST, URINE: PRESENT /HPF
COLOR, URINE: YELLOW
GLUCOSE, URINE: NEGATIVE MG/DL
KETONES, URINE: NEGATIVE MG/DL
LEUKOCYTE ESTERASE, URINE: ABNORMAL
NITRITE, URINE: NEGATIVE
PH, URINE: 6 (ref 5–8)
PROTEIN, URINE: NEGATIVE MG/DL
RBC, URINE: ABNORMAL /HPF (ref 0–5)
SPECIFIC GRAVITY, URINE: 1 (ref 1–1.03)
SQUAMOUS EPITHELIAL CELLS, URINE: 13 /HPF
TRANSITIONAL EPITHELIAL CELLS, URINE: <1 /HPF
UROBILINOGEN, URINE: <2 MG/DL (ref 0–1.9)
WBC, URINE: 30 /HPF (ref 0–5)

## 2023-09-27 ENCOUNTER — HOSPITAL ENCOUNTER (INPATIENT)
Dept: DATA CONVERSION | Facility: HOSPITAL | Age: 73
LOS: 2 days | Discharge: HOME | End: 2023-09-29
Attending: INTERNAL MEDICINE | Admitting: INTERNAL MEDICINE
Payer: MEDICARE

## 2023-09-27 LAB
ALBUMIN SERPL-MCNC: 3.9 GM/DL (ref 3.5–5)
ALBUMIN/GLOB SERPL: 1.1 RATIO (ref 1.5–3)
ALP BLD-CCNC: 67 U/L (ref 35–125)
ALT SERPL-CCNC: 21 U/L (ref 5–40)
ANION GAP SERPL CALCULATED.3IONS-SCNC: 12 MMOL/L (ref 0–19)
ANTICOAGULANT: NORMAL
APTT PPP: 25.6 SEC (ref 22–32.5)
AST SERPL-CCNC: 20 U/L (ref 5–40)
BASOPHILS # BLD AUTO: 0.09 K/UL (ref 0–0.22)
BASOPHILS NFR BLD AUTO: 0.8 % (ref 0–1)
BILIRUB DIRECT SERPL-MCNC: 0.2 MG/DL (ref 0–0.2)
BILIRUB INDIRECT SERPL-MCNC: 0.2 MG/DL (ref 0–0.8)
BILIRUB SERPL-MCNC: 0.4 MG/DL (ref 0.1–1.2)
BUN SERPL-MCNC: 12 MG/DL (ref 8–25)
BUN/CREAT SERPL: 8.6 RATIO (ref 8–21)
CALCIUM SERPL-MCNC: 9.3 MG/DL (ref 8.5–10.4)
CHLORIDE SERPL-SCNC: 101 MMOL/L (ref 97–107)
CK SERPL-CCNC: 47 U/L (ref 24–195)
CO2 SERPL-SCNC: 28 MMOL/L (ref 24–31)
CREAT SERPL-MCNC: 1.4 MG/DL (ref 0.4–1.6)
DEPRECATED RDW RBC AUTO: 42.4 FL (ref 37–54)
DIFFERENTIAL METHOD BLD: ABNORMAL
EOSINOPHIL # BLD AUTO: 0.27 K/UL (ref 0–0.45)
EOSINOPHIL NFR BLD: 2.3 % (ref 0–3)
ERYTHROCYTE [DISTWIDTH] IN BLOOD BY AUTOMATED COUNT: 12.8 % (ref 11.7–15)
GFR SERPL CREATININE-BSD FRML MDRD: 40 ML/MIN/1.73 M2
GLOBULIN SER-MCNC: 3.5 G/DL (ref 1.9–3.7)
GLUCOSE SERPL-MCNC: 101 MG/DL (ref 65–99)
HCT VFR BLD AUTO: 40.5 % (ref 36–44)
HGB BLD-MCNC: 13.5 GM/DL (ref 12–15)
HS TROPONIN T DELTA: 1 (ref 0–4)
HS TROPONIN T DELTA: ABNORMAL (ref 0–4)
IMM GRANULOCYTES # BLD AUTO: 0.04 K/UL (ref 0–0.1)
INR PPP: 1 (ref 0.86–1.16)
LYMPHOCYTES # BLD AUTO: 2.45 K/UL (ref 1.2–3.2)
LYMPHOCYTES NFR BLD MANUAL: 20.7 % (ref 20–40)
MCH RBC QN AUTO: 30.1 PG (ref 26–34)
MCHC RBC AUTO-ENTMCNC: 33.3 % (ref 31–37)
MCV RBC AUTO: 90.2 FL (ref 80–100)
MONOCYTES # BLD AUTO: 0.94 K/UL (ref 0–0.8)
MONOCYTES NFR BLD MANUAL: 7.9 % (ref 0–8)
NEUTROPHILS # BLD AUTO: 8.04 K/UL
NEUTROPHILS # BLD AUTO: 8.04 K/UL (ref 1.8–7.7)
NEUTROPHILS.IMMATURE NFR BLD: 0.3 % (ref 0–1)
NEUTS SEG NFR BLD: 68 % (ref 50–70)
NRBC BLD-RTO: 0 /100 WBC
NT-PROBNP SERPL-MCNC: 401 PG/ML (ref 0–353)
PLATELET # BLD AUTO: 399 K/UL (ref 150–450)
PMV BLD AUTO: 9.1 CU (ref 7–12.6)
POTASSIUM SERPL-SCNC: 3.6 MMOL/L (ref 3.4–5.1)
PROT SERPL-MCNC: 7.4 G/DL (ref 5.9–7.9)
PROTHROMBIN TIME: 10.5 SEC (ref 9.3–12.7)
RBC # BLD AUTO: 4.49 M/UL (ref 4–4.9)
SODIUM SERPL-SCNC: 141 MMOL/L (ref 133–145)
TROPONIN T SERPL-MCNC: 15 NG/L
TROPONIN T SERPL-MCNC: 16 NG/L
WBC # BLD AUTO: 11.8 K/UL (ref 4.5–11)

## 2023-09-27 PROCEDURE — 99222 1ST HOSP IP/OBS MODERATE 55: CPT | Performed by: INTERNAL MEDICINE

## 2023-09-28 VITALS — HEIGHT: 66 IN | WEIGHT: 245.59 LBS | BODY MASS INDEX: 39.47 KG/M2

## 2023-09-28 LAB
ALBUMIN SERPL-MCNC: 3.6 GM/DL (ref 3.5–5)
ALBUMIN/GLOB SERPL: 1.1 RATIO (ref 1.5–3)
ALP BLD-CCNC: 56 U/L (ref 35–125)
ALT SERPL-CCNC: 18 U/L (ref 5–40)
ANION GAP SERPL CALCULATED.3IONS-SCNC: 10 MMOL/L (ref 0–19)
APPEARANCE PLAS: ABNORMAL
AST SERPL-CCNC: 16 U/L (ref 5–40)
BASOPHILS # BLD AUTO: 0.08 K/UL (ref 0–0.22)
BASOPHILS NFR BLD AUTO: 0.8 % (ref 0–1)
BILIRUB SERPL-MCNC: 0.7 MG/DL (ref 0.1–1.2)
BUN SERPL-MCNC: 15 MG/DL (ref 8–25)
BUN/CREAT SERPL: 10.7 RATIO (ref 8–21)
CALCIUM SERPL-MCNC: 8.9 MG/DL (ref 8.5–10.4)
CHLORIDE SERPL-SCNC: 100 MMOL/L (ref 97–107)
CHOLEST SERPL-MCNC: 213 MG/DL (ref 133–200)
CHOLEST/HDLC SERPL: 4.7 RATIO
CO2 SERPL-SCNC: 27 MMOL/L (ref 24–31)
COLOR SPUN FLD: ABNORMAL
CREAT SERPL-MCNC: 1.4 MG/DL (ref 0.4–1.6)
DEPRECATED RDW RBC AUTO: 42.5 FL (ref 37–54)
DIFFERENTIAL METHOD BLD: ABNORMAL
EOSINOPHIL # BLD AUTO: 0.32 K/UL (ref 0–0.45)
EOSINOPHIL NFR BLD: 3.1 % (ref 0–3)
ERYTHROCYTE [DISTWIDTH] IN BLOOD BY AUTOMATED COUNT: 12.8 % (ref 11.7–15)
FASTING STATUS PATIENT QL REPORTED: ABNORMAL
GFR SERPL CREATININE-BSD FRML MDRD: 40 ML/MIN/1.73 M2
GLOBULIN SER-MCNC: 3.3 G/DL (ref 1.9–3.7)
GLUCOSE SERPL-MCNC: 101 MG/DL (ref 65–99)
HCT VFR BLD AUTO: 37.6 % (ref 36–44)
HDLC SERPL-MCNC: 45 MG/DL
HGB BLD-MCNC: 12.3 GM/DL (ref 12–15)
IMM GRANULOCYTES # BLD AUTO: 0.03 K/UL (ref 0–0.1)
LDLC SERPL CALC-MCNC: 133 MG/DL (ref 65–130)
LYMPHOCYTES # BLD AUTO: 2.89 K/UL (ref 1.2–3.2)
LYMPHOCYTES NFR BLD MANUAL: 28.2 % (ref 20–40)
MCH RBC QN AUTO: 29.5 PG (ref 26–34)
MCHC RBC AUTO-ENTMCNC: 32.7 % (ref 31–37)
MCV RBC AUTO: 90.2 FL (ref 80–100)
MONOCYTES # BLD AUTO: 0.9 K/UL (ref 0–0.8)
MONOCYTES NFR BLD MANUAL: 8.8 % (ref 0–8)
NEUTROPHILS # BLD AUTO: 6.02 K/UL
NEUTROPHILS # BLD AUTO: 6.02 K/UL (ref 1.8–7.7)
NEUTROPHILS.IMMATURE NFR BLD: 0.3 % (ref 0–1)
NEUTS SEG NFR BLD: 58.8 % (ref 50–70)
NRBC BLD-RTO: 0 /100 WBC
PLATELET # BLD AUTO: 367 K/UL (ref 150–450)
PMV BLD AUTO: 9.2 CU (ref 7–12.6)
POTASSIUM SERPL-SCNC: 3.8 MMOL/L (ref 3.4–5.1)
PROT SERPL-MCNC: 6.9 G/DL (ref 5.9–7.9)
RBC # BLD AUTO: 4.17 M/UL (ref 4–4.9)
SODIUM SERPL-SCNC: 137 MMOL/L (ref 133–145)
TRIGL SERPL-MCNC: 174 MG/DL (ref 40–150)
WBC # BLD AUTO: 10.2 K/UL (ref 4.5–11)

## 2023-09-28 PROCEDURE — 99232 SBSQ HOSP IP/OBS MODERATE 35: CPT | Performed by: INTERNAL MEDICINE

## 2023-09-28 RX ORDER — METOPROLOL TARTRATE 25 MG/1
25 TABLET, FILM COATED ORAL 2 TIMES DAILY
Status: DISCONTINUED | OUTPATIENT
Start: 2023-09-30 | End: 2023-09-29 | Stop reason: HOSPADM

## 2023-09-28 RX ORDER — NITROGLYCERIN 0.4 MG/1
0.4 TABLET SUBLINGUAL EVERY 5 MIN PRN
Status: DISCONTINUED | OUTPATIENT
Start: 2023-09-30 | End: 2023-09-29 | Stop reason: HOSPADM

## 2023-09-28 RX ORDER — ALUMINUM HYDROXIDE, MAGNESIUM HYDROXIDE, AND SIMETHICONE 1200; 120; 1200 MG/30ML; MG/30ML; MG/30ML
30 SUSPENSION ORAL EVERY 4 HOURS PRN
Status: DISCONTINUED | OUTPATIENT
Start: 2023-09-30 | End: 2023-09-29 | Stop reason: HOSPADM

## 2023-09-28 RX ORDER — GABAPENTIN 600 MG/1
300 TABLET ORAL 3 TIMES DAILY
Status: DISCONTINUED | OUTPATIENT
Start: 2023-09-30 | End: 2023-09-29 | Stop reason: HOSPADM

## 2023-09-28 RX ORDER — ONDANSETRON 4 MG/1
4 TABLET, ORALLY DISINTEGRATING ORAL EVERY 6 HOURS PRN
Status: DISCONTINUED | OUTPATIENT
Start: 2023-09-30 | End: 2023-09-29 | Stop reason: HOSPADM

## 2023-09-28 RX ORDER — IPRATROPIUM BROMIDE AND ALBUTEROL SULFATE 2.5; .5 MG/3ML; MG/3ML
3 SOLUTION RESPIRATORY (INHALATION)
Status: DISCONTINUED | OUTPATIENT
Start: 2023-09-30 | End: 2023-09-29 | Stop reason: HOSPADM

## 2023-09-28 RX ORDER — LEVOTHYROXINE SODIUM 125 UG/1
125 TABLET ORAL
Status: DISCONTINUED | OUTPATIENT
Start: 2023-09-30 | End: 2023-09-29 | Stop reason: HOSPADM

## 2023-09-28 RX ORDER — MECLIZINE HYDROCHLORIDE 25 MG/1
25 TABLET ORAL 3 TIMES DAILY PRN
Status: DISCONTINUED | OUTPATIENT
Start: 2023-09-30 | End: 2023-09-29 | Stop reason: HOSPADM

## 2023-09-28 RX ORDER — ACETAMINOPHEN 325 MG/1
650 TABLET ORAL EVERY 4 HOURS PRN
Status: DISCONTINUED | OUTPATIENT
Start: 2023-09-30 | End: 2023-09-29 | Stop reason: HOSPADM

## 2023-09-28 RX ORDER — FUROSEMIDE 10 MG/ML
40 INJECTION INTRAMUSCULAR; INTRAVENOUS
Status: DISCONTINUED | OUTPATIENT
Start: 2023-09-30 | End: 2023-09-29 | Stop reason: HOSPADM

## 2023-09-28 RX ORDER — POTASSIUM CHLORIDE 20 MEQ/1
20 TABLET, EXTENDED RELEASE ORAL
Status: DISCONTINUED | OUTPATIENT
Start: 2023-09-30 | End: 2023-09-29

## 2023-09-28 RX ORDER — DIPHENHYDRAMINE HCL 25 MG
50 TABLET ORAL EVERY 6 HOURS PRN
Status: DISCONTINUED | OUTPATIENT
Start: 2023-09-30 | End: 2023-09-29 | Stop reason: HOSPADM

## 2023-09-28 RX ORDER — PANTOPRAZOLE SODIUM 40 MG/1
40 TABLET, DELAYED RELEASE ORAL DAILY
Status: DISCONTINUED | OUTPATIENT
Start: 2023-09-30 | End: 2023-09-29 | Stop reason: HOSPADM

## 2023-09-28 RX ORDER — ADHESIVE BANDAGE
30 BANDAGE TOPICAL NIGHTLY PRN
Status: DISCONTINUED | OUTPATIENT
Start: 2023-09-30 | End: 2023-09-29 | Stop reason: HOSPADM

## 2023-09-28 RX ORDER — HEPARIN SODIUM 5000 [USP'U]/ML
5000 INJECTION, SOLUTION INTRAVENOUS; SUBCUTANEOUS EVERY 12 HOURS
Status: DISCONTINUED | OUTPATIENT
Start: 2023-09-30 | End: 2023-09-29 | Stop reason: HOSPADM

## 2023-09-29 LAB
ANION GAP SERPL CALCULATED.3IONS-SCNC: 13 MMOL/L (ref 0–19)
BUN SERPL-MCNC: 20 MG/DL (ref 8–25)
BUN/CREAT SERPL: 12.5 RATIO (ref 8–21)
CALCIUM SERPL-MCNC: 9.2 MG/DL (ref 8.5–10.4)
CHLORIDE SERPL-SCNC: 99 MMOL/L (ref 97–107)
CO2 SERPL-SCNC: 26 MMOL/L (ref 24–31)
CREAT SERPL-MCNC: 1.6 MG/DL (ref 0.4–1.6)
GFR SERPL CREATININE-BSD FRML MDRD: 34 ML/MIN/1.73 M2
GLUCOSE SERPL-MCNC: 109 MG/DL (ref 65–99)
POTASSIUM SERPL-SCNC: 3.7 MMOL/L (ref 3.4–5.1)
SODIUM SERPL-SCNC: 138 MMOL/L (ref 133–145)

## 2023-09-29 PROCEDURE — 99238 HOSP IP/OBS DSCHRG MGMT 30/<: CPT | Performed by: INTERNAL MEDICINE

## 2023-09-29 RX ORDER — AMLODIPINE BESYLATE 5 MG/1
5 TABLET ORAL DAILY
Status: DISCONTINUED | OUTPATIENT
Start: 2023-09-30 | End: 2023-09-29

## 2023-09-29 RX ORDER — POTASSIUM CHLORIDE 20 MEQ/1
20 TABLET, EXTENDED RELEASE ORAL
Status: DISCONTINUED | OUTPATIENT
Start: 2023-09-30 | End: 2023-09-29 | Stop reason: HOSPADM

## 2023-09-29 RX ORDER — IPRATROPIUM BROMIDE AND ALBUTEROL SULFATE 2.5; .5 MG/3ML; MG/3ML
3 SOLUTION RESPIRATORY (INHALATION) EVERY 2 HOUR PRN
Status: DISCONTINUED | OUTPATIENT
Start: 2023-09-30 | End: 2023-09-29 | Stop reason: HOSPADM

## 2023-10-04 ENCOUNTER — OFFICE VISIT (OUTPATIENT)
Dept: PRIMARY CARE | Facility: CLINIC | Age: 73
End: 2023-10-04
Payer: MEDICARE

## 2023-10-04 VITALS
WEIGHT: 246 LBS | DIASTOLIC BLOOD PRESSURE: 76 MMHG | SYSTOLIC BLOOD PRESSURE: 132 MMHG | BODY MASS INDEX: 39.53 KG/M2 | HEIGHT: 66 IN

## 2023-10-04 DIAGNOSIS — I10 BENIGN HYPERTENSION: ICD-10-CM

## 2023-10-04 DIAGNOSIS — E78.00 HIGH CHOLESTEROL: ICD-10-CM

## 2023-10-04 DIAGNOSIS — J84.10 PULMONARY FIBROSIS (MULTI): ICD-10-CM

## 2023-10-04 DIAGNOSIS — R60.0 LEG EDEMA: ICD-10-CM

## 2023-10-04 DIAGNOSIS — R06.00 DYSPNEA, UNSPECIFIED TYPE: Primary | ICD-10-CM

## 2023-10-04 DIAGNOSIS — K21.9 GASTROESOPHAGEAL REFLUX DISEASE WITHOUT ESOPHAGITIS: ICD-10-CM

## 2023-10-04 PROCEDURE — 3078F DIAST BP <80 MM HG: CPT | Performed by: INTERNAL MEDICINE

## 2023-10-04 PROCEDURE — 1126F AMNT PAIN NOTED NONE PRSNT: CPT | Performed by: INTERNAL MEDICINE

## 2023-10-04 PROCEDURE — 1111F DSCHRG MED/CURRENT MED MERGE: CPT | Performed by: INTERNAL MEDICINE

## 2023-10-04 PROCEDURE — 1159F MED LIST DOCD IN RCRD: CPT | Performed by: INTERNAL MEDICINE

## 2023-10-04 PROCEDURE — 3008F BODY MASS INDEX DOCD: CPT | Performed by: INTERNAL MEDICINE

## 2023-10-04 PROCEDURE — 99214 OFFICE O/P EST MOD 30 MIN: CPT | Performed by: INTERNAL MEDICINE

## 2023-10-04 PROCEDURE — 3075F SYST BP GE 130 - 139MM HG: CPT | Performed by: INTERNAL MEDICINE

## 2023-10-04 PROCEDURE — 1036F TOBACCO NON-USER: CPT | Performed by: INTERNAL MEDICINE

## 2023-10-04 RX ORDER — PANTOPRAZOLE SODIUM 40 MG/1
40 TABLET, DELAYED RELEASE ORAL DAILY
Qty: 90 TABLET | Refills: 0 | Status: SHIPPED | OUTPATIENT
Start: 2023-10-04

## 2023-10-04 NOTE — PROGRESS NOTES
"Subjective   Patient ID: Dixie Wilks is a 73 y.o. female who presents for Follow-up (multiple medical issues.).    The patient came here for multiple medical issues.  She was in a hospital with shortness of breath.  Dr. Gibson stopped the amlodipine, thinking it might be retaining the water, but she is  not much better.  She came here for follow-up on various conditions.  She has shortness of breath and dyspnea.  She told me even coming from parking lot, she became short of breath, dyspnea on exertion, not feeling good.  Cardiac wise, she is okay.    I have personally reviewed the patient's Past Medical History, Medications, Allergies, Social History, and Family History in the EMR.    Review of Systems   All other systems reviewed and are negative.  The patient was a smoker several years ago.  Heart is okay.    Objective   /76   Ht 1.676 m (5' 6\")   Wt 112 kg (246 lb)   BMI 39.71 kg/m²     Physical Exam  Vitals reviewed.   HENT:      Head: Normocephalic and atraumatic.      Right Ear: Hearing, tympanic membrane, ear canal and external ear normal.      Left Ear: Hearing, tympanic membrane, ear canal and external ear normal.      Nose: Nose normal.   Cardiovascular:      Heart sounds: Normal heart sounds, S1 normal and S2 normal. No murmur heard.     No friction rub.   Pulmonary:      Effort: Pulmonary effort is normal.      Breath sounds: Wheezing (Minimal) present.   Abdominal:      Palpations: There is no hepatomegaly, splenomegaly or mass.   Musculoskeletal:      Right lower le+ Edema present.      Left lower le+ Edema present.   Lymphadenopathy:      Lower Body: No right inguinal adenopathy. No left inguinal adenopathy.   Neurological:      Cranial Nerves: Cranial nerves 2-12 are intact.      Sensory: No sensory deficit.      Motor: Motor function is intact.      Deep Tendon Reflexes: Reflexes are normal and symmetric.     LAB WORK: I pulled out CT scan from 2019.  It did show some basal scarring " and chest x-ray indicative.    Assessment/Plan   Problem List Items Addressed This Visit             ICD-10-CM       Gastrointestinal and Abdominal    GERD (gastroesophageal reflux disease) K21.9    Relevant Medications    pantoprazole (ProtoNix) 40 mg EC tablet       Pulmonary and Pneumonias    Dyspnea - Primary R06.00     Other Visit Diagnoses         Codes    Pulmonary fibrosis (CMS/[facility])     J84.10    Relevant Orders    CT chest high resolution    Pulmonary function testing (Ancillary Performed)    Benign hypertension     I10    High cholesterol     E78.00    Leg edema     R60.0        1. Shortness of breath, dyspnea on exertion.  I am worried about lung diseases like pulmonary fibrosis etc.  It is beyond my practice, but immediately pulmonary function test, ABG and the high-resolution 64-cut scan ordered.  2. Hypertension, okay.  3. High cholesterol, stable.  Monitor.  4. Edema, on Lasix and potassium.  5. I do not think it is a heart failure, but edema.  I will continue current treatment.  6. I shall see her in a week after the testing.  7. Hospital chart reviewed.    Scribe Attestation  By signing my name below, I, Amada Avila, Scribe attest that this documentation has been prepared under the direction and in the presence of Yarelis Blanco MD.

## 2023-10-05 DIAGNOSIS — G43.009 MIGRAINE WITHOUT AURA AND WITHOUT STATUS MIGRAINOSUS, NOT INTRACTABLE: Primary | ICD-10-CM

## 2023-10-05 RX ORDER — ONDANSETRON 4 MG/1
4 TABLET, ORALLY DISINTEGRATING ORAL EVERY 8 HOURS PRN
Qty: 20 TABLET | Refills: 1 | Status: SHIPPED | OUTPATIENT
Start: 2023-10-05 | End: 2023-11-24

## 2023-10-05 RX ORDER — BUTALBITAL, ACETAMINOPHEN AND CAFFEINE 50; 325; 40 MG/1; MG/1; MG/1
2 TABLET ORAL DAILY PRN
Qty: 20 TABLET | Refills: 0 | Status: SHIPPED | OUTPATIENT
Start: 2023-10-05 | End: 2023-12-16 | Stop reason: SDUPTHER

## 2023-10-09 ENCOUNTER — CLINICAL SUPPORT (OUTPATIENT)
Dept: AUDIOLOGY | Facility: CLINIC | Age: 73
End: 2023-10-09
Payer: MEDICARE

## 2023-10-09 ENCOUNTER — OFFICE VISIT (OUTPATIENT)
Dept: OTOLARYNGOLOGY | Facility: CLINIC | Age: 73
End: 2023-10-09
Payer: MEDICARE

## 2023-10-09 VITALS — BODY MASS INDEX: 41.48 KG/M2 | HEIGHT: 64 IN | WEIGHT: 243 LBS | TEMPERATURE: 97.5 F

## 2023-10-09 DIAGNOSIS — H93.13 SUBJECTIVE TINNITUS OF BOTH EARS: ICD-10-CM

## 2023-10-09 DIAGNOSIS — R42 DIZZINESS: Primary | ICD-10-CM

## 2023-10-09 DIAGNOSIS — H93.12 TINNITUS OF LEFT EAR: ICD-10-CM

## 2023-10-09 DIAGNOSIS — H90.3 SENSORINEURAL HEARING LOSS (SNHL) OF BOTH EARS: Primary | ICD-10-CM

## 2023-10-09 DIAGNOSIS — R42 DIZZINESS: ICD-10-CM

## 2023-10-09 PROCEDURE — 1111F DSCHRG MED/CURRENT MED MERGE: CPT | Performed by: OTOLARYNGOLOGY

## 2023-10-09 PROCEDURE — 92557 COMPREHENSIVE HEARING TEST: CPT | Performed by: AUDIOLOGIST

## 2023-10-09 PROCEDURE — 1159F MED LIST DOCD IN RCRD: CPT | Performed by: OTOLARYNGOLOGY

## 2023-10-09 PROCEDURE — 3008F BODY MASS INDEX DOCD: CPT | Performed by: OTOLARYNGOLOGY

## 2023-10-09 PROCEDURE — 1036F TOBACCO NON-USER: CPT | Performed by: OTOLARYNGOLOGY

## 2023-10-09 PROCEDURE — 92550 TYMPANOMETRY & REFLEX THRESH: CPT | Performed by: AUDIOLOGIST

## 2023-10-09 PROCEDURE — 99214 OFFICE O/P EST MOD 30 MIN: CPT | Performed by: OTOLARYNGOLOGY

## 2023-10-09 PROCEDURE — 1126F AMNT PAIN NOTED NONE PRSNT: CPT | Performed by: OTOLARYNGOLOGY

## 2023-10-09 NOTE — PATIENT INSTRUCTIONS

## 2023-10-09 NOTE — PROGRESS NOTES
JUANITA  Dixie Wilks is a 73 y.o. female here to follow-up on dizziness.  She is getting physical therapy and this is helping somewhat.  She also has 4 years of left-sided tinnitus, nonpulsatile.  Multiple instances of cerebral imaging without mass.  Audiogram today with bilateral symmetric high-frequency sensorineural hearing loss.  Normal tympanometry.  Denies otalgia and otorrhea.      Past Medical History:   Diagnosis Date    Abnormal findings on diagnostic imaging of skull and head, not elsewhere classified 11/26/2019    Abnormal MRI of head    Angioedema     Arthritis     Body mass index (BMI) 38.0-38.9, adult     BMI 38.0-38.9,adult    Headache, unspecified 09/18/2019    Headache    High cholesterol     History of angioedema     Hypertension     Hypothyroidism     Low back pain, unspecified 06/25/2015    Acute low back pain due to trauma    Migraine with aura, not intractable, without status migrainosus 12/12/2022    Migraine with aura and without status migrainosus, not intractable    Nausea 10/22/2015    Nausea    Neuropathic pain     Obesity     Pain in unspecified hip 08/22/2016    Hip pain    Personal history of other diseases of the digestive system 08/14/2013    History of gastroenteritis    Personal history of other diseases of the respiratory system 11/17/2014    History of acute sinusitis    Personal history of other diseases of the respiratory system 07/27/2015    History of acute bronchitis    Personal history of other diseases of urinary system 10/10/2013    History of hematuria    Personal history of other infectious and parasitic diseases 09/30/2015    History of athlete's foot    Personal history of other infectious and parasitic diseases 11/04/2015    History of candidiasis of mouth    Personal history of other specified conditions 11/30/2016    History of abdominal pain    Personal history of other specified conditions 02/06/2014    History of insomnia    Personal history of other specified  conditions 10/07/2013    History of abdominal pain    Personal history of other specified conditions 10/10/2013    History of urinary frequency    Personal history of other specified conditions 10/22/2013    History of dizziness    Personal history of other specified conditions 10/22/2013    History of syncope    Postmenopausal             Medications:     Current Outpatient Medications:     albuterol 90 mcg/actuation inhaler, Inhale 2 puffs 3 times a day., Disp: 18 g, Rfl: 3    budesonide-formoteroL (Symbicort) 80-4.5 mcg/actuation inhaler, Inhale 2 puffs 2 times a day. Rinse mouth with water after use to reduce aftertaste and incidence of candidiasis. Do not swallow., Disp: 6.9 g, Rfl: 0    butalbital-acetaminophen-caff -40 mg tablet, Take 2 tablets by mouth once daily as needed for headaches or migraine., Disp: 20 tablet, Rfl: 0    diphenhydrAMINE (Benadryl Allergy) 25 mg tablet, Take by mouth once daily., Disp: , Rfl:     EPINEPHrine 0.3 mg/0.3 mL injection syringe, INJECT INTRAMUSCULARLY AS DIRECTED., Disp: 10 each, Rfl: 0    ergocalciferol (Vitamin D-2) 1.25 MG (13904 UT) capsule, Take 1 capsule (1,250 mcg) by mouth 2 times a day., Disp: , Rfl:     fexofenadine (Allegra Allergy) 180 mg tablet, Take 1 tablet (180 mg) by mouth once daily., Disp: 30 tablet, Rfl: 3    furosemide (Lasix) 40 mg tablet, Take 1 tablet (40 mg) by mouth once daily., Disp: 30 tablet, Rfl: 5    gabapentin (Neurontin) 300 mg capsule, Take 1 capsule (300 mg) by mouth in the morning and 1 capsule (300 mg) in the evening and 1 capsule (300 mg) before bedtime., Disp: 90 capsule, Rfl: 0    levothyroxine (Synthroid, Levoxyl) 125 mcg tablet, Take 1 tablet (125 mcg) by mouth once daily., Disp: 90 tablet, Rfl: 1    loratadine (Claritin) 10 mg tablet, Take 1 tablet (10 mg) by mouth once daily., Disp: 30 tablet, Rfl: 2    magnesium oxide (Mag-Ox) 400 mg (241.3 mg magnesium) tablet, Take 1 tablet (400 mg) by mouth once daily., Disp: 90 tablet,  "Rfl: 1    meclizine (Antivert) 25 mg tablet, Take 1 tablet (25 mg) by mouth 3 times a day., Disp: , Rfl:     metoprolol tartrate (Lopressor) 25 mg tablet, TAKE 1 TABLET BY MOUTH TWICE  DAILY, Disp: 180 tablet, Rfl: 3    montelukast (Singulair) 10 mg tablet, Take 1 tablet (10 mg) by mouth once daily at bedtime., Disp: 30 tablet, Rfl: 3    ondansetron ODT (Zofran-ODT) 4 mg disintegrating tablet, Take 1 tablet (4 mg) by mouth every 8 hours if needed for nausea or vomiting., Disp: 20 tablet, Rfl: 1    pantoprazole (ProtoNix) 40 mg EC tablet, Take 1 tablet (40 mg) by mouth once daily., Disp: 90 tablet, Rfl: 0    potassium chloride CR (Klor-Con M20) 20 mEq ER tablet, Take 1 tablet (20 mEq) by mouth 2 times a day. Do not crush or chew., Disp: 60 tablet, Rfl: 5     Allergies:  Allergies   Allergen Reactions    Dexamethasone Bleeding    Formaldehyde Anaphylaxis    Levofloxacin Swelling     Depression    Cefepime Rash    Colchicine Swelling    Hydromorphone Nausea/vomiting    Indomethacin Swelling    Penicillins Hives    Sulfa (Sulfonamide Antibiotics) Hives and Other    Methotrexate Nausea Only    Other Unknown     Allergy: Cardiac Monitor Leads, Reaction: Unknown  Allergy: Fiberglass, Reaction: Moderate/Swelling  Allergy: Resins/Plastics, Reaction: Severe/Anaphylaxis    No Known Food Allergies    Ubrelvy [Ubrogepant] Unknown    Prochlorperazine Unknown        Physical Exam:  Last Recorded Vitals  Temperature 36.4 °C (97.5 °F), height 1.626 m (5' 4\"), weight 110 kg (243 lb).  General:     General appearance: Well-developed, well-nourished in no acute distress.       Voice:  normal       Head/face: Normal appearance; nontender to palpation     Facial nerve function: Normal and symmetric bilaterally.    Oral/oropharynx:     Oral vestibule: Normal labial and gingival mucosa     Tongue/floor of mouth: Normal without lesion     Oropharynx: Clear.  No lesions present of the hard/soft palate, posterior pharynx    Neck:     Neck: " Normal appearance, trachea midline     Salivary glands: Normal to palpation bilaterally     Lymph nodes: No cervical lymphadenopathy to palpation     Thyroid: No thyromegaly.  No palpable nodules     Range of motion: Normal    Neurological:     Cortical functions: Alert and oriented x3, appropriate affect       Larynx/hypopharynx:     Laryngeal findings: Mirror exam inadequate or limited secondary to enlarged base of tongue and/or excessive gagging    Ear:     Ear canal: Normal bilaterally     Tympanic membrane: Intact and mobile bilaterally     Pinna: Normal bilaterally     Hearing:  Gross hearing assessment normal by voice    Nose:     Visualized using: Anterior rhinoscopy     Nasopharynx: Inadequate mirror exam secondary to gag, anatomy.       Nasal dorsum: Nontraumatic midline appearance     Septum: Midline     Inferior turbinates: Normally sized     Mucosa: Bilateral, pink, normal appearing       Assessment/Plan   We reviewed the audiogram together.  Tinnitus protocol reviewed.  She will continue her physical therapy and we will recheck an audiogram in 1 year, sooner as needed         Walt King MD  Patient was identified as a fall risk. Risk prevention instructions provided.

## 2023-10-09 NOTE — PROGRESS NOTES
AUDIOLOGY ADULT AUDIOMETRIC EVALUATION    Name:  Gretta Wilks  :  1950  Age:  73 y.o.  Date of Evaluation:  2023    Reason for visit: Patient is seen in the clinic today at the request of otolaryngology for an audiologic evaluation.     HISTORY  Patient complains of hearing loss, tinnitus and balance issues    EVALUATION  See scanned audiogram: “Media” > “Audiology Report”.    RESULTS  Otoscopic Evaluation:  Right Ear: clear ear canal  Left Ear: clear ear canal    Immittance Measures:  Tympanometry:  Right Ear: A  Left Ear: A    Acoustic Reflexes:  Ipsilateral Right Ear:  100 100 100 100   Ipsilateral Left Ear:    100 100 100 nr  Contralateral Right Ear: did not evaluate  Contralateral Left Ear: did not evaluate    Distortion Product Otoacoustic Emissions (DPOAEs):  Right Ear: refer 1-4  Left Ear:    refer 1-4    Audiometry:  Test Technique and Reliability: fair to good  Standard audiometry via headphones. Reliability is good.    Pure tone air and bone conduction audiometry:  Right Ear:  mild normal to moderate severe snhl with good wrs.  Left Ear:    mild normal to moderate severe snhl with good wrs.    Speech Audiometry (Word Recognition Scores):   Right Ear: 92  Left Ear:    92    IMPRESSIONS    Mild moderate to severe snhl au     The presence of acoustic reflexes within normal intensity limits is consistent with normal middle ear and brainstem function, and suggests that auditory sensitivity is not significantly impaired. An elevated or absent acoustic reflex threshold is consistent with a middle ear disorder, hearing loss in the stimulated ear, and/or interruption of neural innervation of the stapedius muscle. Present DPOAEs suggest normal/near normal cochlear outer hair cell function and are consistent with no greater than a mild hearing loss at those frequencies. Absent DPOAEs are consistent with abnormal cochlear outer hair cell function and some degree of hearing loss at those  frequencies.    RECOMMENDATIONS  - Follow up with otolaryngology today as scheduled.  - Audiologic evaluation as needed.  - Annual audiologic evaluation, sooner if an acute change is noted.  - Audiologic evaluation in conjunction with otologic care, if an acute change is noted, and/or annually.  - Consider hearing aids. Contact insurance to determine if there is an applicable benefit and where it can be used. Contact our office to schedule an appointment - Follow-up with medical care team as planned.    PATIENT EDUCATION  Discussed results, impressions and recommendations with the patient. Questions were addressed and the patient was encouraged to contact our office should concerns arise.    Time for this encounter:10:12- 10:47

## 2023-10-11 ENCOUNTER — ANCILLARY PROCEDURE (OUTPATIENT)
Dept: RADIOLOGY | Facility: CLINIC | Age: 73
End: 2023-10-11
Payer: MEDICARE

## 2023-10-11 DIAGNOSIS — J84.10 PULMONARY FIBROSIS (MULTI): ICD-10-CM

## 2023-10-11 PROCEDURE — 71250 CT THORAX DX C-: CPT | Performed by: STUDENT IN AN ORGANIZED HEALTH CARE EDUCATION/TRAINING PROGRAM

## 2023-10-11 PROCEDURE — 71250 CT THORAX DX C-: CPT | Mod: MG

## 2023-10-12 ENCOUNTER — OFFICE VISIT (OUTPATIENT)
Dept: PRIMARY CARE | Facility: CLINIC | Age: 73
End: 2023-10-12
Payer: MEDICARE

## 2023-10-12 VITALS
BODY MASS INDEX: 41.48 KG/M2 | SYSTOLIC BLOOD PRESSURE: 130 MMHG | WEIGHT: 243 LBS | HEIGHT: 64 IN | DIASTOLIC BLOOD PRESSURE: 68 MMHG

## 2023-10-12 DIAGNOSIS — N63.10 MASS OF RIGHT BREAST, UNSPECIFIED QUADRANT: Primary | ICD-10-CM

## 2023-10-12 DIAGNOSIS — R53.83 FATIGUE, UNSPECIFIED TYPE: ICD-10-CM

## 2023-10-12 DIAGNOSIS — E78.00 HIGH CHOLESTEROL: ICD-10-CM

## 2023-10-12 DIAGNOSIS — J42 CHRONIC BRONCHITIS, UNSPECIFIED CHRONIC BRONCHITIS TYPE (MULTI): ICD-10-CM

## 2023-10-12 DIAGNOSIS — I10 PRIMARY HYPERTENSION: ICD-10-CM

## 2023-10-12 DIAGNOSIS — J40 BRONCHITIS: ICD-10-CM

## 2023-10-12 PROCEDURE — 1036F TOBACCO NON-USER: CPT | Performed by: INTERNAL MEDICINE

## 2023-10-12 PROCEDURE — 1126F AMNT PAIN NOTED NONE PRSNT: CPT | Performed by: INTERNAL MEDICINE

## 2023-10-12 PROCEDURE — 3078F DIAST BP <80 MM HG: CPT | Performed by: INTERNAL MEDICINE

## 2023-10-12 PROCEDURE — 1159F MED LIST DOCD IN RCRD: CPT | Performed by: INTERNAL MEDICINE

## 2023-10-12 PROCEDURE — 1111F DSCHRG MED/CURRENT MED MERGE: CPT | Performed by: INTERNAL MEDICINE

## 2023-10-12 PROCEDURE — 3075F SYST BP GE 130 - 139MM HG: CPT | Performed by: INTERNAL MEDICINE

## 2023-10-12 PROCEDURE — 99214 OFFICE O/P EST MOD 30 MIN: CPT | Performed by: INTERNAL MEDICINE

## 2023-10-12 PROCEDURE — 3008F BODY MASS INDEX DOCD: CPT | Performed by: INTERNAL MEDICINE

## 2023-10-12 RX ORDER — NEBULIZER AND COMPRESSOR
EACH MISCELLANEOUS
Qty: 1 EACH | Refills: 0 | Status: SHIPPED | OUTPATIENT
Start: 2023-10-12 | End: 2024-01-22 | Stop reason: ALTCHOICE

## 2023-10-12 RX ORDER — IPRATROPIUM BROMIDE AND ALBUTEROL SULFATE 2.5; .5 MG/3ML; MG/3ML
3 SOLUTION RESPIRATORY (INHALATION) 4 TIMES DAILY PRN
Qty: 90 ML | Refills: 5 | Status: SHIPPED | OUTPATIENT
Start: 2023-10-12 | End: 2023-10-29 | Stop reason: HOSPADM

## 2023-10-12 NOTE — PROGRESS NOTES
"Subjective   Patient ID: Dixie Wilks is a 73 y.o. female who presents for Follow-up (multiple medical issues).    Gretta Wilks today came here for multiple medical issues.  She had a CT scan done.  She wants to discuss that.  She did not see Dr. Gibson today.  She came for follow-up on various conditions.  She told me important thing, in hospital when she had nebulized treatment, aerosol, she felt better.  She wants to feel better again.  She is short of breath.  She is a nonsmoker all her life, she wants me to correct my record.  She came for follow-up on various conditions.  There is a question of mass in her breast.    I have personally reviewed the patient's Past Medical History, Medications, Allergies, Social History, and Family History in the EMR.    Review of Systems   All other systems reviewed and are negative.    Objective   /68   Ht 1.626 m (5' 4\")   Wt 110 kg (243 lb)   BMI 41.71 kg/m²     Physical Exam  Vitals reviewed.   Cardiovascular:      Heart sounds: Normal heart sounds, S1 normal and S2 normal. No murmur heard.     No friction rub.   Pulmonary:      Effort: Pulmonary effort is normal.      Breath sounds: Wheezing (bilateral) present.   Abdominal:      Palpations: There is no hepatomegaly, splenomegaly or mass.   Musculoskeletal:      Right lower leg: No edema.      Left lower leg: No edema.   Lymphadenopathy:      Lower Body: No right inguinal adenopathy. No left inguinal adenopathy.   Neurological:      Cranial Nerves: Cranial nerves 2-12 are intact.      Sensory: No sensory deficit.      Motor: Motor function is intact.      Deep Tendon Reflexes: Reflexes are normal and symmetric.     LAB WORK: Laboratory testing discussed.    Assessment/Plan   Problem List Items Addressed This Visit             ICD-10-CM       Cardiac and Vasculature    Primary hypertension I10       Symptoms and Signs    Fatigue R53.83     Other Visit Diagnoses         Codes    Mass of right breast, unspecified " quadrant    -  Primary N63.10    Relevant Orders     US breast complete right    Bronchitis     J40    Relevant Medications    ipratropium-albuteroL (Duo-Neb) 0.5-2.5 mg/3 mL nebulizer solution    nebulizer and compressor device    nebulizer accessories misc    Chronic bronchitis, unspecified chronic bronchitis type (CMS/HCC)     J42    High cholesterol     E78.00        1. Chronic bronchitis, probably restrictive lung disease.  Lung function tests pending.  Home nebulizer helps, given right away.  2. ______.  Symbicort not helping.  We will try some Breztri sample.  3. Tired, fatigued, exhausted.  She checks her pulse ox herself at night.  Many times it is low.  I am going to do night pulse ox.  She might benefit from night oxygen.  4. Breast lump.  I ordered ultrasound.  She has her own, Dr. Cooney, breast surgeon.  She is going to follow with that.  5. Hypertension, okay.  6. Cholesterol, stable.  7. Home nebulizer trial and lung function tests.  8. I congratulated her ______ pulmonary fibrosis, which is great.  9. I shall see her back in a week after the testing.    Scribe Attestation  By signing my name below, I, Leidy Haynes, Agusto attest that this documentation has been prepared under the direction and in the presence of Yarelis Blanco MD.

## 2023-10-17 ENCOUNTER — HOSPITAL ENCOUNTER (OUTPATIENT)
Dept: RADIOLOGY | Facility: HOSPITAL | Age: 73
Discharge: HOME | End: 2023-10-17
Payer: MEDICARE

## 2023-10-17 ENCOUNTER — HOSPITAL ENCOUNTER (OUTPATIENT)
Dept: RESPIRATORY THERAPY | Facility: HOSPITAL | Age: 73
End: 2023-10-17
Payer: MEDICARE

## 2023-10-17 DIAGNOSIS — N63.10 MASS OF RIGHT BREAST, UNSPECIFIED QUADRANT: ICD-10-CM

## 2023-10-17 DIAGNOSIS — R92.8 OTHER ABNORMAL AND INCONCLUSIVE FINDINGS ON DIAGNOSTIC IMAGING OF BREAST: ICD-10-CM

## 2023-10-17 PROCEDURE — 76642 ULTRASOUND BREAST LIMITED: CPT | Mod: RT

## 2023-10-18 ENCOUNTER — HOSPITAL ENCOUNTER (OUTPATIENT)
Dept: RESPIRATORY THERAPY | Facility: HOSPITAL | Age: 73
Discharge: HOME | End: 2023-10-18
Payer: MEDICARE

## 2023-10-18 DIAGNOSIS — J84.10 PULMONARY FIBROSIS (MULTI): ICD-10-CM

## 2023-10-18 LAB
BASE EXCESS BLDA CALC-SCNC: 2 MMOL/L (ref -2–3)
BODY TEMPERATURE: 37 DEGREES CELSIUS
HCO3 BLDA-SCNC: 26.5 MMOL/L (ref 22–26)
OXYHGB MFR BLDA: 91 % (ref 94–98)
PCO2 BLDA: 40 MM HG (ref 38–42)
PH BLDA: 7.43 PH (ref 7.38–7.42)
PO2 BLDA: 61 MM HG (ref 85–95)
SAO2 % BLDA: 93 % (ref 94–100)

## 2023-10-18 PROCEDURE — 94060 EVALUATION OF WHEEZING: CPT | Performed by: INTERNAL MEDICINE

## 2023-10-18 PROCEDURE — 36600 WITHDRAWAL OF ARTERIAL BLOOD: CPT

## 2023-10-18 PROCEDURE — 94726 PLETHYSMOGRAPHY LUNG VOLUMES: CPT | Performed by: INTERNAL MEDICINE

## 2023-10-18 PROCEDURE — 94729 DIFFUSING CAPACITY: CPT

## 2023-10-18 PROCEDURE — 82805 BLOOD GASES W/O2 SATURATION: CPT

## 2023-10-18 PROCEDURE — 94729 DIFFUSING CAPACITY: CPT | Performed by: INTERNAL MEDICINE

## 2023-10-20 ENCOUNTER — OFFICE VISIT (OUTPATIENT)
Dept: PRIMARY CARE | Facility: CLINIC | Age: 73
End: 2023-10-20
Payer: MEDICARE

## 2023-10-20 VITALS
WEIGHT: 243 LBS | BODY MASS INDEX: 41.48 KG/M2 | SYSTOLIC BLOOD PRESSURE: 126 MMHG | DIASTOLIC BLOOD PRESSURE: 72 MMHG | HEIGHT: 64 IN

## 2023-10-20 DIAGNOSIS — R06.89 BREATHING DIFFICULT: Primary | ICD-10-CM

## 2023-10-20 DIAGNOSIS — R09.02 HYPOXIA: ICD-10-CM

## 2023-10-20 DIAGNOSIS — J02.9 PHARYNGITIS, UNSPECIFIED ETIOLOGY: ICD-10-CM

## 2023-10-20 DIAGNOSIS — I10 HYPERTENSION, UNSPECIFIED TYPE: ICD-10-CM

## 2023-10-20 DIAGNOSIS — I89.0 LYMPHEDEMA: ICD-10-CM

## 2023-10-20 DIAGNOSIS — T78.3XXA ANGIOEDEMA, INITIAL ENCOUNTER: ICD-10-CM

## 2023-10-20 DIAGNOSIS — E78.00 HIGH CHOLESTEROL: ICD-10-CM

## 2023-10-20 PROCEDURE — 1111F DSCHRG MED/CURRENT MED MERGE: CPT | Performed by: INTERNAL MEDICINE

## 2023-10-20 PROCEDURE — 1159F MED LIST DOCD IN RCRD: CPT | Performed by: INTERNAL MEDICINE

## 2023-10-20 PROCEDURE — 3078F DIAST BP <80 MM HG: CPT | Performed by: INTERNAL MEDICINE

## 2023-10-20 PROCEDURE — 3074F SYST BP LT 130 MM HG: CPT | Performed by: INTERNAL MEDICINE

## 2023-10-20 PROCEDURE — 1036F TOBACCO NON-USER: CPT | Performed by: INTERNAL MEDICINE

## 2023-10-20 PROCEDURE — 3008F BODY MASS INDEX DOCD: CPT | Performed by: INTERNAL MEDICINE

## 2023-10-20 PROCEDURE — 99213 OFFICE O/P EST LOW 20 MIN: CPT | Performed by: INTERNAL MEDICINE

## 2023-10-20 PROCEDURE — 1125F AMNT PAIN NOTED PAIN PRSNT: CPT | Performed by: INTERNAL MEDICINE

## 2023-10-20 RX ORDER — AZITHROMYCIN 500 MG/1
500 TABLET, FILM COATED ORAL DAILY
Qty: 5 TABLET | Refills: 0 | Status: ON HOLD | OUTPATIENT
Start: 2023-10-20 | End: 2023-10-29

## 2023-10-20 ASSESSMENT — ENCOUNTER SYMPTOMS
LOSS OF SENSATION IN FEET: 0
DEPRESSION: 0
OCCASIONAL FEELINGS OF UNSTEADINESS: 0

## 2023-10-21 NOTE — PROGRESS NOTES
"Subjective   Patient ID: Dixie Wilks is a 73 y.o. female who presents for Follow-up (multiple medical issues.).    Gretta Wilks today came here for multiple medical issues.  1. Yesterday, she has an episode of angioedema.  She has to take EpiPen.  She felt better, but she is still short of breath, not feeling good.  2. She thinks she could have pharyngitis and cough.  3. She has lymphedema.  Nobody contacted her for pump.  4. She had a lung function test done, report pending.  ABGs were done.  She wants a discussion on that.  She is concerned.  She is not feeling good.  Weak, tired, fatigued, and exhausted.    I have personally reviewed the patient's Past Medical History, Medications, Allergies, Social History, and Family History in the EMR.    Review of Systems   All other systems reviewed and are negative.    Objective   /72   Ht 1.626 m (5' 4\")   Wt 110 kg (243 lb)   BMI 41.71 kg/m²     Physical Exam  Vitals reviewed.   Cardiovascular:      Heart sounds: Normal heart sounds, S1 normal and S2 normal. No murmur heard.     No friction rub.   Pulmonary:      Effort: Pulmonary effort is normal.      Breath sounds: Normal air entry. Wheezing (Bilateral) present.      Comments: Airway is reasonably okay, but swelling present.  Abdominal:      Palpations: There is no hepatomegaly, splenomegaly or mass.   Musculoskeletal:      Right lower le+ Edema present.      Left lower le+ Edema present.   Lymphadenopathy:      Lower Body: No right inguinal adenopathy. No left inguinal adenopathy.   Neurological:      Cranial Nerves: Cranial nerves 2-12 are intact.      Sensory: No sensory deficit.      Motor: Motor function is intact.      Deep Tendon Reflexes: Reflexes are normal and symmetric.     LAB WORK: Laboratory testing discussed.    Assessment/Plan   Problem List Items Addressed This Visit             ICD-10-CM       Allergies and Adverse Reactions    Angioedema T78.3XXA       Cardiac and Vasculature    " Hypertension I10     Other Visit Diagnoses         Codes    Breathing difficult    -  Primary R06.89    Relevant Medications    azithromycin (Zithromax) 500 mg tablet    Other Relevant Orders    Referral to Pulmonology    Pharyngitis, unspecified etiology     J02.9    Lymphedema     I89.0    Hypoxia     R09.02    High cholesterol     E78.00        1. Pharyngitis and cough.  Erythromycin, but I doubt.  2. Angioedema.  Responds to the primary treatment okay.  I think we need probably secondary, tertiary opinion. Refer her to Dr. Nehemiah Holbrook, renowned senior allergist.  3. Lymphedema.  She has a relation with Dr. Kaba.  She is going to get a pump set up.  I am all for it.  4. Hypoxia and liver function test.  She is very concerning.  ______ lung function test not done. I am going to refer her to Dr. Duffy, lung specialist at [facility].  She wants somebody closer.  5. Edema, okay.  6. Hypertension, okay.  7. High cholesterol.  Monitor.  8. I shall see her back in three to four weeks, but I will continue to coordinate her care.    Scribe Attestation  By signing my name below, I, Agusto Koo attest that this documentation has been prepared under the direction and in the presence of Yarelis Blanco MD.

## 2023-10-23 ENCOUNTER — OFFICE VISIT (OUTPATIENT)
Dept: ALLERGY | Facility: CLINIC | Age: 73
End: 2023-10-23
Payer: MEDICARE

## 2023-10-23 VITALS
SYSTOLIC BLOOD PRESSURE: 175 MMHG | BODY MASS INDEX: 40.44 KG/M2 | DIASTOLIC BLOOD PRESSURE: 80 MMHG | HEIGHT: 65 IN | HEART RATE: 56 BPM

## 2023-10-23 DIAGNOSIS — R06.02 SHORTNESS OF BREATH: Primary | ICD-10-CM

## 2023-10-23 DIAGNOSIS — R06.2 WHEEZE: ICD-10-CM

## 2023-10-23 DIAGNOSIS — T78.3XXA ANGIOEDEMA, INITIAL ENCOUNTER: ICD-10-CM

## 2023-10-23 PROCEDURE — 3008F BODY MASS INDEX DOCD: CPT | Performed by: ALLERGY & IMMUNOLOGY

## 2023-10-23 PROCEDURE — 1159F MED LIST DOCD IN RCRD: CPT | Performed by: ALLERGY & IMMUNOLOGY

## 2023-10-23 PROCEDURE — 1125F AMNT PAIN NOTED PAIN PRSNT: CPT | Performed by: ALLERGY & IMMUNOLOGY

## 2023-10-23 PROCEDURE — 1111F DSCHRG MED/CURRENT MED MERGE: CPT | Performed by: ALLERGY & IMMUNOLOGY

## 2023-10-23 PROCEDURE — 99214 OFFICE O/P EST MOD 30 MIN: CPT | Performed by: ALLERGY & IMMUNOLOGY

## 2023-10-23 PROCEDURE — 3079F DIAST BP 80-89 MM HG: CPT | Performed by: ALLERGY & IMMUNOLOGY

## 2023-10-23 PROCEDURE — 3077F SYST BP >= 140 MM HG: CPT | Performed by: ALLERGY & IMMUNOLOGY

## 2023-10-23 PROCEDURE — 1036F TOBACCO NON-USER: CPT | Performed by: ALLERGY & IMMUNOLOGY

## 2023-10-23 RX ORDER — FLUTICASONE FUROATE AND VILANTEROL 100; 25 UG/1; UG/1
1 POWDER RESPIRATORY (INHALATION) DAILY
Qty: 1 EACH | Refills: 5 | Status: SHIPPED | OUTPATIENT
Start: 2023-10-23 | End: 2024-01-22 | Stop reason: ALTCHOICE

## 2023-10-23 ASSESSMENT — PAIN SCALES - GENERAL: PAINLEVEL: 2

## 2023-10-23 NOTE — PROGRESS NOTES
"Subjective   Patient ID:   [id number]   Dixie Wilks is a 73 y.o. female who presents for Shortness of Breath, Angioedema, and Cough.    Chief Complaint   Patient presents with    Shortness of Breath    Angioedema    Cough          HPI  This patient is here to evaluate for:    Last Thursday,had anaphylaxis  Uvula swollen, laying on tongue, blocking airway.  Benadryl, pepsid  Then prednisone  Throat tightness opened up  Saw Dr. Blanco - he advised she should have done epipen  But she prefers to avoid the IV steroid protocol that's done in the hospital.     She has oral steroids at home.    She attributes it to exposure below.  No new medicines/no NSAIDS since they make her swollen.    She had been admitted to [facility] for CHF - within past 2 weeks ago.   But didn't put her on new medicines.     Had PFT's per Dr. Lopez and chest CT - results pending.  She will be seeing pulmonary 12/6  Having shortness of breath with short distances.   She reports low oxygen. Waking up gasping.   Her pulse is 90%.   Mouth breathing.     SH: moving into a ranch and home shopping  Reactive to glues/resins and everything was brand new    Never been a smoker.     Last visit 2/26/01  Patch testing negative.     Has an albuterol and nebulizer  They help.   Hoarseness   Has singulair    Dr. Duong advised her to take allegra daily and increase to BID if angioedema; I agree.       Review of Systems   All other systems reviewed and are negative.        Objective     /80 (BP Location: Right arm, Patient Position: Sitting, BP Cuff Size: Adult)   Pulse 56   Ht 1.651 m (5' 5\")   BMI 40.44 kg/m²      Physical Exam  Vitals and nursing note reviewed.   Constitutional:       Appearance: Normal appearance. She is normal weight.   HENT:      Head: Normocephalic and atraumatic.      Right Ear: External ear normal.      Left Ear: External ear normal.      Nose: No septal deviation, congestion or rhinorrhea.      Right Turbinates: Not enlarged, swollen " or pale.      Left Turbinates: Not enlarged, swollen or pale.      Mouth/Throat:      Mouth: Mucous membranes are moist.   Eyes:      Extraocular Movements: Extraocular movements intact.      Conjunctiva/sclera: Conjunctivae normal.      Pupils: Pupils are equal, round, and reactive to light.   Cardiovascular:      Rate and Rhythm: Normal rate and regular rhythm.      Pulses: Normal pulses.      Heart sounds: Normal heart sounds.   Pulmonary:      Effort: Pulmonary effort is normal.      Breath sounds: Normal breath sounds. No wheezing, rhonchi or rales.   Musculoskeletal:         General: Normal range of motion.   Skin:     General: Skin is warm and dry.      Findings: No erythema or rash.   Neurological:      General: No focal deficit present.      Mental Status: She is alert and oriented to person, place, and time.   Psychiatric:         Mood and Affect: Mood normal.         Behavior: Behavior normal.       No wheeze, no increase work of breathing     Current Outpatient Medications   Medication Sig Dispense Refill    albuterol 90 mcg/actuation inhaler Inhale 2 puffs 3 times a day. 18 g 3    azithromycin (Zithromax) 500 mg tablet Take 1 tablet (500 mg) by mouth once daily for 5 days. 5 tablet 0    budesonide-formoteroL (Symbicort) 80-4.5 mcg/actuation inhaler Inhale 2 puffs 2 times a day. Rinse mouth with water after use to reduce aftertaste and incidence of candidiasis. Do not swallow. 6.9 g 0    butalbital-acetaminophen-caff -40 mg tablet Take 2 tablets by mouth once daily as needed for headaches or migraine. 20 tablet 0    diphenhydrAMINE (Benadryl Allergy) 25 mg tablet Take by mouth once daily.      EPINEPHrine 0.3 mg/0.3 mL injection syringe INJECT INTRAMUSCULARLY AS DIRECTED. 10 each 0    ergocalciferol (Vitamin D-2) 1.25 MG (72633 UT) capsule Take 1 capsule (1,250 mcg) by mouth 2 times a day.      fexofenadine (Allegra Allergy) 180 mg tablet Take 1 tablet (180 mg) by mouth once daily. 30 tablet 3     "furosemide (Lasix) 40 mg tablet Take 1 tablet (40 mg) by mouth once daily. 30 tablet 5    gabapentin (Neurontin) 300 mg capsule Take 1 capsule (300 mg) by mouth in the morning and 1 capsule (300 mg) in the evening and 1 capsule (300 mg) before bedtime. 90 capsule 0    ipratropium-albuteroL (Duo-Neb) 0.5-2.5 mg/3 mL nebulizer solution Take 3 mL by nebulization 4 times a day as needed for wheezing or shortness of breath. 90 mL 5    levothyroxine (Synthroid, Levoxyl) 125 mcg tablet Take 1 tablet (125 mcg) by mouth once daily. 90 tablet 1    loratadine (Claritin) 10 mg tablet Take 1 tablet (10 mg) by mouth once daily. 30 tablet 2    magnesium oxide (Mag-Ox) 400 mg (241.3 mg magnesium) tablet Take 1 tablet (400 mg) by mouth once daily. 90 tablet 1    meclizine (Antivert) 25 mg tablet Take 1 tablet (25 mg) by mouth 3 times a day.      metoprolol tartrate (Lopressor) 25 mg tablet TAKE 1 TABLET BY MOUTH TWICE  DAILY 180 tablet 3    montelukast (Singulair) 10 mg tablet Take 1 tablet (10 mg) by mouth once daily at bedtime. 30 tablet 3    nebulizer accessories misc Use four times daily 10 each 5    nebulizer and compressor device Use as directed 1 each 0    ondansetron ODT (Zofran-ODT) 4 mg disintegrating tablet Take 1 tablet (4 mg) by mouth every 8 hours if needed for nausea or vomiting. 20 tablet 1    pantoprazole (ProtoNix) 40 mg EC tablet Take 1 tablet (40 mg) by mouth once daily. 90 tablet 0    potassium chloride CR (Klor-Con M20) 20 mEq ER tablet Take 1 tablet (20 mEq) by mouth 2 times a day. Do not crush or chew. 60 tablet 5     No current facility-administered medications for this visit.        No components found for: \"LASTLABSF\"     Component  Ref Range & Units 5 d ago   POCT pH, Arterial  7.38 - 7.42 pH 7.43 High    POCT pCO2, Arterial  38 - 42 mm Hg 40   POCT pO2, Arterial  85 - 95 mm Hg 61 Low    POCT SO2, Arterial  94 - 100 % 93 Low    POCT Oxy Hemoglobin, Arterial  94.0 - 98.0 % 91.0 Low    POCT Base Excess, " Arterial  -2.0 - 3.0 mmol/L 2.0   POCT HCO3 Calculated, Arterial  22.0 - 26.0 mmol/L 26.5 High    Patient Temperature  degrees Celsius 37.0   Comment: NOTE: Patient Results are Not Corrected for Temperature   Resulting Agency [facility]              Specimen Collected: 10/18/23 11:40           Pft results pending  CT chest - no pulmonary findings. Rec discuss other findings with your PCP/ordering dr    Assessment/Plan   Diagnoses and all orders for this visit:  Shortness of breath  -     fluticasone furoate-vilanteroL (Breo Ellipta) 100-25 mcg/dose inhaler; Inhale 1 puff once daily. Rinse and gargle with water afterwards  Wheeze  -     fluticasone furoate-vilanteroL (Breo Ellipta) 100-25 mcg/dose inhaler; Inhale 1 puff once daily. Rinse and gargle with water afterwards  Angioedema, initial encounter        Idiopathic, chronic   angioedema:  Continue allegra 180 mg daily to twice a day  Keep epipen on hand    We also discussed signs and symptoms and treatment of anaphylaxis. Your allergy can be life threatening and triggers need to be avoided.  In case of a mild reaction limited to the skin, then an antihistamine may be used.  If there is a more severe allergic reaction such as throat tightness, wheezing, shortness of breath, vomiting, or other signs of anaphylaxis, then epinephrine (Epi-Pen 0.3mg) should be used.  We discussed how and when to use this medication.  Epinephrine expires after one year and should not be kept in a hot or cold location as this will degrade the medication.     I want you to see pulmonary. Since you have had + response to albuterol and are having shortness of breath, I added breo until you can get in with Pulmonary  They will be able to review the PFT's and imaging with you     Walt Berg MD

## 2023-10-26 ENCOUNTER — APPOINTMENT (OUTPATIENT)
Dept: RADIOLOGY | Facility: HOSPITAL | Age: 73
DRG: 203 | End: 2023-10-26
Payer: MEDICARE

## 2023-10-26 PROCEDURE — 99285 EMERGENCY DEPT VISIT HI MDM: CPT | Performed by: EMERGENCY MEDICINE

## 2023-10-26 PROCEDURE — 71045 X-RAY EXAM CHEST 1 VIEW: CPT

## 2023-10-27 ENCOUNTER — HOSPITAL ENCOUNTER (INPATIENT)
Facility: HOSPITAL | Age: 73
LOS: 2 days | Discharge: HOME | DRG: 203 | End: 2023-10-29
Attending: EMERGENCY MEDICINE | Admitting: INTERNAL MEDICINE
Payer: MEDICARE

## 2023-10-27 DIAGNOSIS — R06.09 DOE (DYSPNEA ON EXERTION): Primary | ICD-10-CM

## 2023-10-27 DIAGNOSIS — Z78.0 POST-MENOPAUSAL: ICD-10-CM

## 2023-10-27 DIAGNOSIS — L03.119 CELLULITIS AND ABSCESS OF LOWER EXTREMITY: ICD-10-CM

## 2023-10-27 DIAGNOSIS — J44.1 COPD EXACERBATION (MULTI): ICD-10-CM

## 2023-10-27 DIAGNOSIS — J40 BRONCHITIS: ICD-10-CM

## 2023-10-27 DIAGNOSIS — I10 PRIMARY HYPERTENSION: ICD-10-CM

## 2023-10-27 DIAGNOSIS — R06.89 BREATHING DIFFICULT: ICD-10-CM

## 2023-10-27 DIAGNOSIS — L02.419 CELLULITIS AND ABSCESS OF LOWER EXTREMITY: ICD-10-CM

## 2023-10-27 PROBLEM — J45.909 EXTRINSIC ASTHMA WITHOUT COMPLICATION (HHS-HCC): Chronic | Status: ACTIVE | Noted: 2023-05-25

## 2023-10-27 LAB
ALBUMIN SERPL-MCNC: 3.9 G/DL (ref 3.5–5)
ALP BLD-CCNC: 63 U/L (ref 35–125)
ALT SERPL-CCNC: 14 U/L (ref 5–40)
ANION GAP SERPL CALC-SCNC: 11 MMOL/L
AST SERPL-CCNC: 17 U/L (ref 5–40)
BASOPHILS # BLD AUTO: 0.09 X10*3/UL (ref 0–0.1)
BASOPHILS NFR BLD AUTO: 0.6 %
BILIRUB SERPL-MCNC: 0.5 MG/DL (ref 0.1–1.2)
BUN SERPL-MCNC: 11 MG/DL (ref 8–25)
CALCIUM SERPL-MCNC: 9.3 MG/DL (ref 8.5–10.4)
CHLORIDE SERPL-SCNC: 101 MMOL/L (ref 97–107)
CO2 SERPL-SCNC: 26 MMOL/L (ref 24–31)
CREAT SERPL-MCNC: 1.1 MG/DL (ref 0.4–1.6)
EOSINOPHIL # BLD AUTO: 0.24 X10*3/UL (ref 0–0.4)
EOSINOPHIL NFR BLD AUTO: 1.7 %
ERYTHROCYTE [DISTWIDTH] IN BLOOD BY AUTOMATED COUNT: 12.6 % (ref 11.5–14.5)
GFR SERPL CREATININE-BSD FRML MDRD: 53 ML/MIN/1.73M*2
GLUCOSE SERPL-MCNC: 123 MG/DL (ref 65–99)
HCT VFR BLD AUTO: 40.4 % (ref 36–46)
HGB BLD-MCNC: 13.1 G/DL (ref 12–16)
IMM GRANULOCYTES # BLD AUTO: 0.05 X10*3/UL (ref 0–0.5)
IMM GRANULOCYTES NFR BLD AUTO: 0.4 % (ref 0–0.9)
LYMPHOCYTES # BLD AUTO: 2.62 X10*3/UL (ref 0.8–3)
LYMPHOCYTES NFR BLD AUTO: 18.4 %
MCH RBC QN AUTO: 28.9 PG (ref 26–34)
MCHC RBC AUTO-ENTMCNC: 32.4 G/DL (ref 32–36)
MCV RBC AUTO: 89 FL (ref 80–100)
MONOCYTES # BLD AUTO: 1.14 X10*3/UL (ref 0.05–0.8)
MONOCYTES NFR BLD AUTO: 8 %
NEUTROPHILS # BLD AUTO: 10.09 X10*3/UL (ref 1.6–5.5)
NEUTROPHILS NFR BLD AUTO: 70.9 %
NRBC BLD-RTO: 0 /100 WBCS (ref 0–0)
PLATELET # BLD AUTO: 397 X10*3/UL (ref 150–450)
PMV BLD AUTO: 9.5 FL (ref 7.5–11.5)
POTASSIUM SERPL-SCNC: 3.6 MMOL/L (ref 3.4–5.1)
PROT SERPL-MCNC: 7.3 G/DL (ref 5.9–7.9)
RBC # BLD AUTO: 4.53 X10*6/UL (ref 4–5.2)
SODIUM SERPL-SCNC: 138 MMOL/L (ref 133–145)
TROPONIN T SERPL-MCNC: 26 NG/L
WBC # BLD AUTO: 14.2 X10*3/UL (ref 4.4–11.3)

## 2023-10-27 PROCEDURE — 9420000001 HC RT PATIENT EDUCATION 5 MIN

## 2023-10-27 PROCEDURE — 2500000001 HC RX 250 WO HCPCS SELF ADMINISTERED DRUGS (ALT 637 FOR MEDICARE OP): Performed by: INTERNAL MEDICINE

## 2023-10-27 PROCEDURE — 2500000004 HC RX 250 GENERAL PHARMACY W/ HCPCS (ALT 636 FOR OP/ED): Performed by: INTERNAL MEDICINE

## 2023-10-27 PROCEDURE — 80053 COMPREHEN METABOLIC PANEL: CPT | Performed by: EMERGENCY MEDICINE

## 2023-10-27 PROCEDURE — 36415 COLL VENOUS BLD VENIPUNCTURE: CPT | Performed by: EMERGENCY MEDICINE

## 2023-10-27 PROCEDURE — 94640 AIRWAY INHALATION TREATMENT: CPT

## 2023-10-27 PROCEDURE — 2500000002 HC RX 250 W HCPCS SELF ADMINISTERED DRUGS (ALT 637 FOR MEDICARE OP, ALT 636 FOR OP/ED): Performed by: INTERNAL MEDICINE

## 2023-10-27 PROCEDURE — 3490 HC RX 250 GENERAL PHARMACY W/ HCPCS (ALT 636 FOR OP/ED): Performed by: INTERNAL MEDICINE

## 2023-10-27 PROCEDURE — 2060000001 HC INTERMEDIATE ICU ROOM DAILY

## 2023-10-27 PROCEDURE — 94760 N-INVAS EAR/PLS OXIMETRY 1: CPT

## 2023-10-27 PROCEDURE — 84484 ASSAY OF TROPONIN QUANT: CPT | Performed by: EMERGENCY MEDICINE

## 2023-10-27 PROCEDURE — 99222 1ST HOSP IP/OBS MODERATE 55: CPT | Performed by: INTERNAL MEDICINE

## 2023-10-27 PROCEDURE — 85025 COMPLETE CBC W/AUTO DIFF WBC: CPT | Performed by: EMERGENCY MEDICINE

## 2023-10-27 RX ORDER — LORATADINE 10 MG/1
10 TABLET ORAL DAILY
Status: DISCONTINUED | OUTPATIENT
Start: 2023-10-27 | End: 2023-10-29 | Stop reason: HOSPADM

## 2023-10-27 RX ORDER — ALBUTEROL SULFATE 90 UG/1
2 AEROSOL, METERED RESPIRATORY (INHALATION)
Status: DISCONTINUED | OUTPATIENT
Start: 2023-10-27 | End: 2023-10-27 | Stop reason: SDUPTHER

## 2023-10-27 RX ORDER — ACETAMINOPHEN 325 MG/1
650 TABLET ORAL EVERY 6 HOURS PRN
Status: DISCONTINUED | OUTPATIENT
Start: 2023-10-27 | End: 2023-10-29 | Stop reason: HOSPADM

## 2023-10-27 RX ORDER — GABAPENTIN 300 MG/1
300 CAPSULE ORAL 3 TIMES DAILY
Status: DISCONTINUED | OUTPATIENT
Start: 2023-10-27 | End: 2023-10-29 | Stop reason: HOSPADM

## 2023-10-27 RX ORDER — PANTOPRAZOLE SODIUM 40 MG/1
40 TABLET, DELAYED RELEASE ORAL DAILY
Status: DISCONTINUED | OUTPATIENT
Start: 2023-10-27 | End: 2023-10-29 | Stop reason: HOSPADM

## 2023-10-27 RX ORDER — BUTALBITAL, ACETAMINOPHEN AND CAFFEINE 50; 325; 40 MG/1; MG/1; MG/1
2 TABLET ORAL DAILY PRN
Status: DISCONTINUED | OUTPATIENT
Start: 2023-10-27 | End: 2023-10-29 | Stop reason: HOSPADM

## 2023-10-27 RX ORDER — DIPHENHYDRAMINE HCL 25 MG
25 TABLET ORAL DAILY
Status: DISCONTINUED | OUTPATIENT
Start: 2023-10-27 | End: 2023-10-29 | Stop reason: HOSPADM

## 2023-10-27 RX ORDER — FLUTICASONE FUROATE AND VILANTEROL 100; 25 UG/1; UG/1
1 POWDER RESPIRATORY (INHALATION) DAILY
Status: DISCONTINUED | OUTPATIENT
Start: 2023-10-27 | End: 2023-10-27 | Stop reason: SDUPTHER

## 2023-10-27 RX ORDER — LANOLIN ALCOHOL/MO/W.PET/CERES
400 CREAM (GRAM) TOPICAL DAILY
Status: DISCONTINUED | OUTPATIENT
Start: 2023-10-27 | End: 2023-10-29 | Stop reason: HOSPADM

## 2023-10-27 RX ORDER — IPRATROPIUM BROMIDE AND ALBUTEROL SULFATE 2.5; .5 MG/3ML; MG/3ML
3 SOLUTION RESPIRATORY (INHALATION) 3 TIMES DAILY PRN
Status: DISCONTINUED | OUTPATIENT
Start: 2023-10-27 | End: 2023-10-29 | Stop reason: HOSPADM

## 2023-10-27 RX ORDER — ERGOCALCIFEROL 1.25 MG/1
1250 CAPSULE ORAL
Status: DISCONTINUED | OUTPATIENT
Start: 2023-11-02 | End: 2023-10-29 | Stop reason: HOSPADM

## 2023-10-27 RX ORDER — IPRATROPIUM BROMIDE AND ALBUTEROL SULFATE 2.5; .5 MG/3ML; MG/3ML
3 SOLUTION RESPIRATORY (INHALATION) 4 TIMES DAILY PRN
Status: DISCONTINUED | OUTPATIENT
Start: 2023-10-27 | End: 2023-10-29 | Stop reason: HOSPADM

## 2023-10-27 RX ORDER — ONDANSETRON 4 MG/1
4 TABLET, ORALLY DISINTEGRATING ORAL EVERY 8 HOURS PRN
Status: DISCONTINUED | OUTPATIENT
Start: 2023-10-27 | End: 2023-10-29 | Stop reason: HOSPADM

## 2023-10-27 RX ORDER — METOPROLOL TARTRATE 25 MG/1
25 TABLET, FILM COATED ORAL 2 TIMES DAILY
Status: DISCONTINUED | OUTPATIENT
Start: 2023-10-27 | End: 2023-10-29 | Stop reason: HOSPADM

## 2023-10-27 RX ORDER — GUAIFENESIN 100 MG/5ML
10 SOLUTION ORAL EVERY 4 HOURS PRN
Status: DISCONTINUED | OUTPATIENT
Start: 2023-10-27 | End: 2023-10-29 | Stop reason: HOSPADM

## 2023-10-27 RX ORDER — MECLIZINE HYDROCHLORIDE 25 MG/1
25 TABLET ORAL 3 TIMES DAILY
Status: DISCONTINUED | OUTPATIENT
Start: 2023-10-27 | End: 2023-10-29 | Stop reason: HOSPADM

## 2023-10-27 RX ORDER — FLUTICASONE FUROATE AND VILANTEROL 100; 25 UG/1; UG/1
1 POWDER RESPIRATORY (INHALATION)
Status: DISCONTINUED | OUTPATIENT
Start: 2023-10-27 | End: 2023-10-29 | Stop reason: HOSPADM

## 2023-10-27 RX ORDER — POTASSIUM CHLORIDE 20 MEQ/1
20 TABLET, EXTENDED RELEASE ORAL 2 TIMES DAILY
Status: DISCONTINUED | OUTPATIENT
Start: 2023-10-27 | End: 2023-10-29 | Stop reason: HOSPADM

## 2023-10-27 RX ORDER — AZITHROMYCIN 500 MG/1
500 TABLET, FILM COATED ORAL DAILY
Status: DISCONTINUED | OUTPATIENT
Start: 2023-10-28 | End: 2023-10-28

## 2023-10-27 RX ORDER — MONTELUKAST SODIUM 10 MG/1
10 TABLET ORAL NIGHTLY
Status: DISCONTINUED | OUTPATIENT
Start: 2023-10-27 | End: 2023-10-29 | Stop reason: HOSPADM

## 2023-10-27 RX ORDER — LEVOTHYROXINE SODIUM 125 UG/1
125 TABLET ORAL DAILY
Status: DISCONTINUED | OUTPATIENT
Start: 2023-10-27 | End: 2023-10-29 | Stop reason: HOSPADM

## 2023-10-27 RX ORDER — EPINEPHRINE 0.3 MG/.3ML
0.3 INJECTION SUBCUTANEOUS AS NEEDED
Status: DISCONTINUED | OUTPATIENT
Start: 2023-10-27 | End: 2023-10-29 | Stop reason: HOSPADM

## 2023-10-27 RX ORDER — FUROSEMIDE 40 MG/1
40 TABLET ORAL DAILY
Status: DISCONTINUED | OUTPATIENT
Start: 2023-10-27 | End: 2023-10-29 | Stop reason: HOSPADM

## 2023-10-27 RX ADMIN — IPRATROPIUM BROMIDE AND ALBUTEROL SULFATE 3 ML: 2.5; .5 SOLUTION RESPIRATORY (INHALATION) at 12:48

## 2023-10-27 RX ADMIN — GABAPENTIN 300 MG: 300 CAPSULE ORAL at 11:58

## 2023-10-27 RX ADMIN — LORATADINE 10 MG: 10 TABLET ORAL at 11:16

## 2023-10-27 RX ADMIN — Medication 400 MG: at 11:16

## 2023-10-27 RX ADMIN — POTASSIUM CHLORIDE 20 MEQ: 1500 TABLET, EXTENDED RELEASE ORAL at 20:50

## 2023-10-27 RX ADMIN — METHYLPREDNISOLONE SODIUM SUCCINATE 40 MG: 40 INJECTION, POWDER, FOR SOLUTION INTRAMUSCULAR; INTRAVENOUS at 18:22

## 2023-10-27 RX ADMIN — POTASSIUM CHLORIDE 20 MEQ: 1500 TABLET, EXTENDED RELEASE ORAL at 11:16

## 2023-10-27 RX ADMIN — LEVOTHYROXINE SODIUM 125 MCG: 0.12 TABLET ORAL at 11:16

## 2023-10-27 RX ADMIN — IPRATROPIUM BROMIDE AND ALBUTEROL SULFATE 3 ML: 2.5; .5 SOLUTION RESPIRATORY (INHALATION) at 21:02

## 2023-10-27 RX ADMIN — METOPROLOL TARTRATE 25 MG: 25 TABLET, FILM COATED ORAL at 11:16

## 2023-10-27 RX ADMIN — MONTELUKAST 10 MG: 10 TABLET, FILM COATED ORAL at 20:50

## 2023-10-27 RX ADMIN — METOPROLOL TARTRATE 25 MG: 25 TABLET, FILM COATED ORAL at 20:50

## 2023-10-27 RX ADMIN — BUTALBITAL, ACETAMINOPHEN, AND CAFFEINE 2 TABLET: 50; 325; 40 TABLET ORAL at 11:11

## 2023-10-27 RX ADMIN — GABAPENTIN 300 MG: 300 CAPSULE ORAL at 15:48

## 2023-10-27 RX ADMIN — MECLIZINE HYDROCHLORIDE 25 MG: 25 TABLET ORAL at 20:50

## 2023-10-27 RX ADMIN — FUROSEMIDE 40 MG: 40 TABLET ORAL at 11:16

## 2023-10-27 RX ADMIN — AZITHROMYCIN MONOHYDRATE 500 MG: 500 INJECTION, POWDER, LYOPHILIZED, FOR SOLUTION INTRAVENOUS at 08:51

## 2023-10-27 RX ADMIN — PANTOPRAZOLE SODIUM 40 MG: 40 TABLET, DELAYED RELEASE ORAL at 11:16

## 2023-10-27 RX ADMIN — MECLIZINE HYDROCHLORIDE 25 MG: 25 TABLET ORAL at 15:48

## 2023-10-27 RX ADMIN — GABAPENTIN 300 MG: 300 CAPSULE ORAL at 20:50

## 2023-10-27 RX ADMIN — METHYLPREDNISOLONE SODIUM SUCCINATE 40 MG: 40 INJECTION, POWDER, FOR SOLUTION INTRAMUSCULAR; INTRAVENOUS at 07:53

## 2023-10-27 RX ADMIN — METHYLPREDNISOLONE SODIUM SUCCINATE 40 MG: 40 INJECTION, POWDER, FOR SOLUTION INTRAMUSCULAR; INTRAVENOUS at 13:38

## 2023-10-27 RX ADMIN — ACETAMINOPHEN 650 MG: 325 TABLET ORAL at 20:50

## 2023-10-27 SDOH — SOCIAL STABILITY: SOCIAL INSECURITY: ARE YOU OR HAVE YOU BEEN THREATENED OR ABUSED PHYSICALLY, EMOTIONALLY, OR SEXUALLY BY ANYONE?: NO

## 2023-10-27 SDOH — SOCIAL STABILITY: SOCIAL INSECURITY: WERE YOU ABLE TO COMPLETE ALL THE BEHAVIORAL HEALTH SCREENINGS?: YES

## 2023-10-27 SDOH — SOCIAL STABILITY: SOCIAL INSECURITY: HAS ANYONE EVER THREATENED TO HURT YOUR FAMILY OR YOUR PETS?: NO

## 2023-10-27 SDOH — SOCIAL STABILITY: SOCIAL INSECURITY: DOES ANYONE TRY TO KEEP YOU FROM HAVING/CONTACTING OTHER FRIENDS OR DOING THINGS OUTSIDE YOUR HOME?: NO

## 2023-10-27 SDOH — SOCIAL STABILITY: SOCIAL INSECURITY: ABUSE: ADULT

## 2023-10-27 SDOH — SOCIAL STABILITY: SOCIAL INSECURITY: DO YOU FEEL ANYONE HAS EXPLOITED OR TAKEN ADVANTAGE OF YOU FINANCIALLY OR OF YOUR PERSONAL PROPERTY?: NO

## 2023-10-27 SDOH — SOCIAL STABILITY: SOCIAL INSECURITY: HAVE YOU HAD THOUGHTS OF HARMING ANYONE ELSE?: NO

## 2023-10-27 SDOH — SOCIAL STABILITY: SOCIAL INSECURITY: ARE THERE ANY APPARENT SIGNS OF INJURIES/BEHAVIORS THAT COULD BE RELATED TO ABUSE/NEGLECT?: NO

## 2023-10-27 SDOH — SOCIAL STABILITY: SOCIAL INSECURITY: DO YOU FEEL UNSAFE GOING BACK TO THE PLACE WHERE YOU ARE LIVING?: NO

## 2023-10-27 ASSESSMENT — LIFESTYLE VARIABLES
AUDIT-C TOTAL SCORE: 0
HOW OFTEN DO YOU HAVE A DRINK CONTAINING ALCOHOL: NEVER
HOW MANY STANDARD DRINKS CONTAINING ALCOHOL DO YOU HAVE ON A TYPICAL DAY: PATIENT DOES NOT DRINK
SKIP TO QUESTIONS 9-10: 1
AUDIT-C TOTAL SCORE: 0
HOW OFTEN DO YOU HAVE 6 OR MORE DRINKS ON ONE OCCASION: NEVER

## 2023-10-27 ASSESSMENT — PATIENT HEALTH QUESTIONNAIRE - PHQ9
SUM OF ALL RESPONSES TO PHQ9 QUESTIONS 1 & 2: 0
1. LITTLE INTEREST OR PLEASURE IN DOING THINGS: NOT AT ALL
2. FEELING DOWN, DEPRESSED OR HOPELESS: NOT AT ALL

## 2023-10-27 ASSESSMENT — COGNITIVE AND FUNCTIONAL STATUS - GENERAL
DAILY ACTIVITIY SCORE: 24
MOBILITY SCORE: 24
PATIENT BASELINE BEDBOUND: NO

## 2023-10-27 ASSESSMENT — ENCOUNTER SYMPTOMS
PSYCHIATRIC NEGATIVE: 1
STRIDOR: 0
ALLERGIC/IMMUNOLOGIC NEGATIVE: 1
CONSTITUTIONAL NEGATIVE: 1
CARDIOVASCULAR NEGATIVE: 1
EYES NEGATIVE: 1
ENDOCRINE NEGATIVE: 1
HEMATOLOGIC/LYMPHATIC NEGATIVE: 1
SHORTNESS OF BREATH: 1
NEUROLOGICAL NEGATIVE: 1
CHOKING: 1
WHEEZING: 1
BACK PAIN: 1
COUGH: 1
CHEST TIGHTNESS: 1
GASTROINTESTINAL NEGATIVE: 1

## 2023-10-27 ASSESSMENT — ACTIVITIES OF DAILY LIVING (ADL)
GROOMING: INDEPENDENT
FEEDING YOURSELF: INDEPENDENT
JUDGMENT_ADEQUATE_SAFELY_COMPLETE_DAILY_ACTIVITIES: YES
LACK_OF_TRANSPORTATION: NO
HEARING - LEFT EAR: FUNCTIONAL
PATIENT'S MEMORY ADEQUATE TO SAFELY COMPLETE DAILY ACTIVITIES?: YES
LACK_OF_TRANSPORTATION: NO
LACK_OF_TRANSPORTATION: NO
BATHING: INDEPENDENT
LACK_OF_TRANSPORTATION: NO
HEARING - RIGHT EAR: FUNCTIONAL
ADEQUATE_TO_COMPLETE_ADL: YES
WALKS IN HOME: INDEPENDENT
LACK_OF_TRANSPORTATION: NO
DRESSING YOURSELF: INDEPENDENT
TOILETING: INDEPENDENT

## 2023-10-27 ASSESSMENT — PAIN - FUNCTIONAL ASSESSMENT
PAIN_FUNCTIONAL_ASSESSMENT: 0-10

## 2023-10-27 ASSESSMENT — PAIN SCALES - GENERAL
PAINLEVEL_OUTOF10: 0 - NO PAIN
PAINLEVEL_OUTOF10: 0 - NO PAIN
PAINLEVEL_OUTOF10: 5 - MODERATE PAIN
PAINLEVEL_OUTOF10: 0 - NO PAIN
PAINLEVEL_OUTOF10: 1
PAINLEVEL_OUTOF10: 0 - NO PAIN

## 2023-10-27 ASSESSMENT — COLUMBIA-SUICIDE SEVERITY RATING SCALE - C-SSRS
1. IN THE PAST MONTH, HAVE YOU WISHED YOU WERE DEAD OR WISHED YOU COULD GO TO SLEEP AND NOT WAKE UP?: NO
6. HAVE YOU EVER DONE ANYTHING, STARTED TO DO ANYTHING, OR PREPARED TO DO ANYTHING TO END YOUR LIFE?: NO
2. HAVE YOU ACTUALLY HAD ANY THOUGHTS OF KILLING YOURSELF?: NO

## 2023-10-27 ASSESSMENT — PAIN DESCRIPTION - PROGRESSION: CLINICAL_PROGRESSION: NOT CHANGED

## 2023-10-27 NOTE — ED PROVIDER NOTES
HPI   Chief Complaint   Patient presents with    Shortness of Breath     The past 2 weeks it has been harder to breath for the patient       73-year-old female with a complicated past medical history is brought to the emergency department with a chief complaint of shortness of breath.  Specifically the patient has COPD and chronic bronchitis.  She is having respiratory distress and is not improving at home.  She called her primary care provider and he agrees that the patient should come to the emergency department for evaluation and likely admission.      History provided by:  Patient and caregiver   used: No                        No data recorded                Patient History   Past Medical History:   Diagnosis Date    Abnormal findings on diagnostic imaging of skull and head, not elsewhere classified 11/26/2019    Abnormal MRI of head    Angioedema     Arthritis     Body mass index (BMI) 38.0-38.9, adult     BMI 38.0-38.9,adult    Headache, unspecified 09/18/2019    Headache    High cholesterol     History of angioedema     Hypertension     Hypothyroidism     Low back pain, unspecified 06/25/2015    Acute low back pain due to trauma    Migraine with aura, not intractable, without status migrainosus 12/12/2022    Migraine with aura and without status migrainosus, not intractable    Nausea 10/22/2015    Nausea    Neuropathic pain     Obesity     Pain in unspecified hip 08/22/2016    Hip pain    Personal history of other diseases of the digestive system 08/14/2013    History of gastroenteritis    Personal history of other diseases of the respiratory system 11/17/2014    History of acute sinusitis    Personal history of other diseases of the respiratory system 07/27/2015    History of acute bronchitis    Personal history of other diseases of urinary system 10/10/2013    History of hematuria    Personal history of other infectious and parasitic diseases 09/30/2015    History of athlete's foot     Personal history of other infectious and parasitic diseases 11/04/2015    History of candidiasis of mouth    Personal history of other specified conditions 11/30/2016    History of abdominal pain    Personal history of other specified conditions 02/06/2014    History of insomnia    Personal history of other specified conditions 10/07/2013    History of abdominal pain    Personal history of other specified conditions 10/10/2013    History of urinary frequency    Personal history of other specified conditions 10/22/2013    History of dizziness    Personal history of other specified conditions 10/22/2013    History of syncope    Postmenopausal      Past Surgical History:   Procedure Laterality Date    KNEE SURGERY  09/23/2013    Knee Surgery    MR HEAD ANGIO WO IV CONTRAST  9/8/2021    MR HEAD ANGIO WO IV CONTRAST [facility]    OTHER SURGICAL HISTORY  09/23/2013    Biopsy Tongue    OTHER SURGICAL HISTORY  09/23/2013    Gynecologic Laparoscopy With Adhesiolysis    TONSILLECTOMY  02/09/2015    Tonsillectomy     Family History   Problem Relation Name Age of Onset    Colon cancer Mother          stage 1    Rheum arthritis Mother      Other (sinua problem) Mother      Other (cardiac disorder) Father      Other (heart problem) Father      No Known Problems Brother      Autoimmune disease Daughter      Esophageal cancer Son      No Known Problems Mother's Sister      Angioedema Other      Hypertension Other      Hyperlipidemia Other       Social History     Tobacco Use    Smoking status: Never    Smokeless tobacco: Never   Substance Use Topics    Alcohol use: Never    Drug use: Never       Physical Exam   ED Triage Vitals [10/26/23 2117]   Temp Heart Rate Resp BP   36.6 °C (97.9 °F) 87 18 (!) 161/98      SpO2 Temp Source Heart Rate Source Patient Position   98 % Oral Monitor Sitting      BP Location FiO2 (%)     Right arm --       Physical Exam  Vitals and nursing note reviewed.   Constitutional:       General: She  is not in acute distress.     Appearance: She is well-developed.   HENT:      Head: Normocephalic and atraumatic.   Eyes:      Conjunctiva/sclera: Conjunctivae normal.   Cardiovascular:      Rate and Rhythm: Normal rate and regular rhythm.      Heart sounds: No murmur heard.  Pulmonary:      Effort: Tachypnea and respiratory distress present.      Breath sounds: Normal breath sounds.   Abdominal:      Palpations: Abdomen is soft.      Tenderness: There is no abdominal tenderness.   Musculoskeletal:         General: No swelling.      Cervical back: Neck supple.   Skin:     General: Skin is warm and dry.      Capillary Refill: Capillary refill takes less than 2 seconds.   Neurological:      Mental Status: She is alert.   Psychiatric:         Mood and Affect: Mood normal.         ED Course & MDM   Diagnoses as of 10/27/23 0806   HAYS (dyspnea on exertion)   COPD exacerbation (CMS/[facility])       Medical Decision Making    HPI:  As Above  PMHx/PSHx/Meds/Allergies/SH/FH as per nursing documentation and reviewed.  Review of systems: Total of 10 systems reviewed and otherwise negative except as noted elsewhere    DDX: As described in MDM    If performed, radiology listed above interpreted by me and confirmed by the Radiologist.  Medications administered during this visit (name and route): see MAR  Social determinants of health considered for this visit: Lives at home and has failed outpatient management multiple times  If performed, EKG interpreted by me and detailed above    MDM Summary/considerations:  73-year-old female with another COPD exacerbation.  Patient is well-known to her primary care provider who will admit.    The patient was seen and triaged by our nursing/medic staff, their vitals were taken and the staff notes were reviewed.  If the patient arrived by an EMS squad or an outside agency, we discussed the case with transporting EMS medic, police, or other historians. My initial assessment was attention to their  airway, breathing, and circulatory status.  We addressed any immediate or life threatening findings and completed a medical history and a physical exam if the patient or those legally responsible were in agreement with this.   Prior to the patient being discharged, I or my PA/NP or the nursing staff discussed the differential, results and discharge plan with the patient and/or family/friend/caregiver if present.  I emphasized the importance of follow-up in 2-3 days unless otherwise specified.  I explained reasons for the patient to return to the Emergency Department. Additional verbal discharge instructions were also given and discussed with the patient to supplement those generated by the EMR. We also discussed medications that were prescribed (if any) including common side effects and interactions. The patient was advised to abstain from driving, operating heavy machinery or making significant decisions while taking medications such as antihistamines, benzodiazepines, opiates and muscle relaxers. All questions were addressed.  They understand return precautions and discharge instructions. The patient and/or family/friend/caregiver expressed understanding.  **Disclaimer:  This note was dictated by speech recognition technology.  Minor errors in transcription may be present.  Please contact for clarification or corrections.    In the case the patient eloped or refused treatment/admission, we offered to the best of our ability to provide care to the patient at the time of this encounter.        Procedure  Procedures     Arina Dominguez MD  10/27/23 0806

## 2023-10-27 NOTE — CARE PLAN
The patient's goals for the shift include      The clinical goals for the shift include no sob with exertion    Over the shift, the patient did not make progress toward the following goals. Barriers to progression include sob. Recommendations to address these barriers include monitor o2.

## 2023-10-27 NOTE — CARE PLAN
Problem: Respiratory  Goal: Clear secretions with interventions this shift  Outcome: Progressing  Goal: Minimize anxiety/maximize coping throughout shift  Outcome: Progressing  Goal: Minimal/no exertional discomfort or dyspnea this shift  Outcome: Met  Goal: No signs of respiratory distress (eg. Use of accessory muscles. Peds grunting)  Outcome: Met  Goal: Tolerate pulmonary toileting this shift  Outcome: Met  Goal: Verbalize decreased shortness of breath this shift  Outcome: Met  Goal: Wean oxygen to maintain O2 saturation per order/standard this shift  Outcome: Met  Goal: Increase self care and/or family involvement in next 24 hours  Outcome: Progressing

## 2023-10-27 NOTE — CONSULTS
Pulmonary Consult Note    Chief Complaint   Patient presents with    Shortness of Breath     The past 2 weeks it has been harder to breath for the patient        History Of Present Illness  Dixie Wilks is a 73 y.o. female presenting with exertional shortness of breath and sensation of congestion and tightening of her upper airways.  She tells me she carries a diagnosis of allergic asthma as well as vocal cord paralysis.  Over the last 24 to 48 hours she has had escalating shortness of breath.  This is in the setting of her baseline shortness of breath.  She actually had a pulmonary function testing done at [facility] last week.  She was recently hospitalized for similar symptoms few weeks ago.  Apparently at that time she was treated for presumptive congestive heart failure.  On discharge symptoms persisted.  She went to see her PCP who advised her to be admitted to the hospital.  We are asked to see her in this regard..     Past Medical History  Chronic allergies: Has been to multiple allergist and immunologist in the past  History of idiopathic angioedema  Allergic asthma  Hypertension  Chronic lower back pain  Paradoxical vocal cord movement    Surgical History  She has a past surgical history that includes Knee surgery (09/23/2013); Other surgical history (09/23/2013); Other surgical history (09/23/2013); Tonsillectomy (02/09/2015); and MR angio head wo IV contrast (9/8/2021).     Social History  She reports that she has never smoked. She has never used smokeless tobacco. She reports that she does not drink alcohol and does not use drugs.    Family History  Family History   Problem Relation Name Age of Onset    Colon cancer Mother          stage 1    Rheum arthritis Mother      Other (sinua problem) Mother      Other (cardiac disorder) Father      Other (heart problem) Father      No Known Problems Brother      Autoimmune disease Daughter      Esophageal cancer Son      No Known Problems Mother's Sister       "Angioedema Other      Hypertension Other      Hyperlipidemia Other          Allergies  Dexamethasone, Formaldehyde, Levofloxacin, Cefepime, Colchicine, Hydromorphone, Indomethacin, Penicillins, Sulfa (sulfonamide antibiotics), Methotrexate, Other, Ubrelvy [ubrogepant], and Prochlorperazine    Review of Systems   Constitutional: Negative.    HENT:  Positive for congestion.         Throat tightness at times   Eyes: Negative.    Respiratory:  Positive for cough, choking, chest tightness, shortness of breath and wheezing. Negative for stridor.    Cardiovascular: Negative.    Gastrointestinal: Negative.    Endocrine: Negative.    Genitourinary: Negative.    Musculoskeletal:  Positive for back pain.   Skin: Negative.    Allergic/Immunologic: Negative.    Neurological: Negative.    Hematological: Negative.    Psychiatric/Behavioral: Negative.           Last Recorded Vitals  Blood pressure 142/71, pulse 68, temperature 36 °C (96.8 °F), temperature source Temporal, resp. rate 18, height 1.651 m (5' 5\"), weight 112 kg (247 lb), SpO2 94 %.     Physical Exam  Constitutional:       Appearance: She is obese.   HENT:      Head: Normocephalic and atraumatic.      Nose: Nose normal.      Mouth/Throat:      Mouth: Mucous membranes are dry.   Eyes:      Extraocular Movements: Extraocular movements intact.      Conjunctiva/sclera: Conjunctivae normal.   Cardiovascular:      Rate and Rhythm: Normal rate and regular rhythm.      Pulses: Normal pulses.      Heart sounds: Normal heart sounds.   Pulmonary:      Effort: Pulmonary effort is normal.      Breath sounds: Wheezing present.      Comments: Faint wheezes  Abdominal:      General: Bowel sounds are normal.   Musculoskeletal:         General: Normal range of motion.      Cervical back: Normal range of motion.      Right lower leg: Edema present.      Left lower leg: Edema present.   Skin:     General: Skin is warm and dry.   Neurological:      General: No focal deficit present.      " Mental Status: She is alert. Mental status is at baseline.   Psychiatric:         Mood and Affect: Mood normal.         Behavior: Behavior normal.             Meds  Scheduled medications  azithromycin, 500 mg, intravenous, q24h  methylPREDNISolone sodium succinate (PF), 40 mg, intravenous, q6h      Continuous medications     PRN medications  PRN medications: acetaminophen, guaiFENesin       Relevant Results  CBC and BMP reviewed.  No peripheral eosinophilia  Chest x-ray shows no acute cardiopulmonary process      Principal Problem:    HAYS (dyspnea on exertion)  Active Problems:    Angioedema    Extrinsic asthma without complication     Not entirely sure etiology of patient's symptoms.  Given wheezing at the very minimum acute exacerbation.  She does have multiple allergies.    Recommendations  Continue with Solu-Medrol  We will add on an IgE level  Add aerosolized bronchodilators  Recently had a full pulmonary function testing and will look for the results  Encourage ambulation  Prophylaxis    Thank you for this consultation.  We will follow with you.  Dr. Krishnan covering this weekend       I spent 18 minutes in the professional and overall care of this patient.      Reno Martin MD  Pulmonary/Critical Care Physician  [facility]

## 2023-10-27 NOTE — PROGRESS NOTES
Met with patient at bedside, introduced self and explained the discharge process.  Patient denies needs at discharge.  She will return home with no needs.  RN TCC following.    Nereyda Oakes RN

## 2023-10-28 LAB
ALBUMIN SERPL-MCNC: 3.5 G/DL (ref 3.5–5)
ALP BLD-CCNC: 59 U/L (ref 35–125)
ALT SERPL-CCNC: 12 U/L (ref 5–40)
ANION GAP SERPL CALC-SCNC: 11 MMOL/L
AST SERPL-CCNC: 14 U/L (ref 5–40)
BASOPHILS # BLD AUTO: 0.03 X10*3/UL (ref 0–0.1)
BASOPHILS NFR BLD AUTO: 0.2 %
BILIRUB SERPL-MCNC: 0.4 MG/DL (ref 0.1–1.2)
BUN SERPL-MCNC: 16 MG/DL (ref 8–25)
CALCIUM SERPL-MCNC: 9.8 MG/DL (ref 8.5–10.4)
CHLORIDE SERPL-SCNC: 100 MMOL/L (ref 97–107)
CO2 SERPL-SCNC: 24 MMOL/L (ref 24–31)
CREAT SERPL-MCNC: 1.2 MG/DL (ref 0.4–1.6)
EOSINOPHIL # BLD AUTO: 0.01 X10*3/UL (ref 0–0.4)
EOSINOPHIL NFR BLD AUTO: 0.1 %
ERYTHROCYTE [DISTWIDTH] IN BLOOD BY AUTOMATED COUNT: 12.7 % (ref 11.5–14.5)
GFR SERPL CREATININE-BSD FRML MDRD: 48 ML/MIN/1.73M*2
GLUCOSE SERPL-MCNC: 205 MG/DL (ref 65–99)
HCT VFR BLD AUTO: 37.2 % (ref 36–46)
HGB BLD-MCNC: 12 G/DL (ref 12–16)
IGE SERPL-ACNC: 13 IU/ML (ref 0–214)
IMM GRANULOCYTES # BLD AUTO: 0.27 X10*3/UL (ref 0–0.5)
IMM GRANULOCYTES NFR BLD AUTO: 1.4 % (ref 0–0.9)
LYMPHOCYTES # BLD AUTO: 1.22 X10*3/UL (ref 0.8–3)
LYMPHOCYTES NFR BLD AUTO: 6.4 %
MCH RBC QN AUTO: 28.8 PG (ref 26–34)
MCHC RBC AUTO-ENTMCNC: 32.3 G/DL (ref 32–36)
MCV RBC AUTO: 89 FL (ref 80–100)
MONOCYTES # BLD AUTO: 0.43 X10*3/UL (ref 0.05–0.8)
MONOCYTES NFR BLD AUTO: 2.3 %
NEUTROPHILS # BLD AUTO: 17.02 X10*3/UL (ref 1.6–5.5)
NEUTROPHILS NFR BLD AUTO: 89.6 %
NRBC BLD-RTO: 0 /100 WBCS (ref 0–0)
PLATELET # BLD AUTO: 296 X10*3/UL (ref 150–450)
PMV BLD AUTO: 11 FL (ref 7.5–11.5)
POTASSIUM SERPL-SCNC: 4.2 MMOL/L (ref 3.4–5.1)
PROT SERPL-MCNC: 7.2 G/DL (ref 5.9–7.9)
RBC # BLD AUTO: 4.16 X10*6/UL (ref 4–5.2)
SODIUM SERPL-SCNC: 135 MMOL/L (ref 133–145)
WBC # BLD AUTO: 19 X10*3/UL (ref 4.4–11.3)

## 2023-10-28 PROCEDURE — 85025 COMPLETE CBC W/AUTO DIFF WBC: CPT | Performed by: INTERNAL MEDICINE

## 2023-10-28 PROCEDURE — 2500000001 HC RX 250 WO HCPCS SELF ADMINISTERED DRUGS (ALT 637 FOR MEDICARE OP): Performed by: INTERNAL MEDICINE

## 2023-10-28 PROCEDURE — 82785 ASSAY OF IGE: CPT | Mod: TRILAB | Performed by: HOSPITALIST

## 2023-10-28 PROCEDURE — 80053 COMPREHEN METABOLIC PANEL: CPT | Performed by: INTERNAL MEDICINE

## 2023-10-28 PROCEDURE — 36415 COLL VENOUS BLD VENIPUNCTURE: CPT | Performed by: INTERNAL MEDICINE

## 2023-10-28 PROCEDURE — 94760 N-INVAS EAR/PLS OXIMETRY 1: CPT

## 2023-10-28 PROCEDURE — 94640 AIRWAY INHALATION TREATMENT: CPT

## 2023-10-28 PROCEDURE — 2500000004 HC RX 250 GENERAL PHARMACY W/ HCPCS (ALT 636 FOR OP/ED): Performed by: INTERNAL MEDICINE

## 2023-10-28 PROCEDURE — 3490 HC RX 250 GENERAL PHARMACY W/ HCPCS (ALT 636 FOR OP/ED): Performed by: INTERNAL MEDICINE

## 2023-10-28 PROCEDURE — 99232 SBSQ HOSP IP/OBS MODERATE 35: CPT | Performed by: INTERNAL MEDICINE

## 2023-10-28 PROCEDURE — 2500000004 HC RX 250 GENERAL PHARMACY W/ HCPCS (ALT 636 FOR OP/ED)

## 2023-10-28 PROCEDURE — 94664 DEMO&/EVAL PT USE INHALER: CPT

## 2023-10-28 PROCEDURE — 2500000002 HC RX 250 W HCPCS SELF ADMINISTERED DRUGS (ALT 637 FOR MEDICARE OP, ALT 636 FOR OP/ED): Performed by: HOSPITALIST

## 2023-10-28 PROCEDURE — 2060000001 HC INTERMEDIATE ICU ROOM DAILY

## 2023-10-28 PROCEDURE — 2500000002 HC RX 250 W HCPCS SELF ADMINISTERED DRUGS (ALT 637 FOR MEDICARE OP, ALT 636 FOR OP/ED): Performed by: INTERNAL MEDICINE

## 2023-10-28 PROCEDURE — 36415 COLL VENOUS BLD VENIPUNCTURE: CPT | Performed by: HOSPITALIST

## 2023-10-28 RX ORDER — PREDNISONE 20 MG/1
20 TABLET ORAL DAILY
Status: DISCONTINUED | OUTPATIENT
Start: 2023-11-03 | End: 2023-10-29 | Stop reason: HOSPADM

## 2023-10-28 RX ORDER — PREDNISONE 20 MG/1
20 TABLET ORAL 2 TIMES DAILY
Status: DISCONTINUED | OUTPATIENT
Start: 2023-10-28 | End: 2023-10-29 | Stop reason: HOSPADM

## 2023-10-28 RX ORDER — PREDNISONE 10 MG/1
10 TABLET ORAL DAILY
Status: DISCONTINUED | OUTPATIENT
Start: 2023-11-08 | End: 2023-10-29 | Stop reason: HOSPADM

## 2023-10-28 RX ADMIN — DIPHENHYDRAMINE HYDROCHLORIDE 25 MG: 25 TABLET ORAL at 05:12

## 2023-10-28 RX ADMIN — GABAPENTIN 300 MG: 300 CAPSULE ORAL at 20:03

## 2023-10-28 RX ADMIN — POTASSIUM CHLORIDE 20 MEQ: 1500 TABLET, EXTENDED RELEASE ORAL at 20:03

## 2023-10-28 RX ADMIN — MECLIZINE HYDROCHLORIDE 25 MG: 25 TABLET ORAL at 09:28

## 2023-10-28 RX ADMIN — PANTOPRAZOLE SODIUM 40 MG: 40 TABLET, DELAYED RELEASE ORAL at 09:28

## 2023-10-28 RX ADMIN — Medication 400 MG: at 09:28

## 2023-10-28 RX ADMIN — METHYLPREDNISOLONE SODIUM SUCCINATE 40 MG: 40 INJECTION, POWDER, FOR SOLUTION INTRAMUSCULAR; INTRAVENOUS at 14:06

## 2023-10-28 RX ADMIN — MECLIZINE HYDROCHLORIDE 25 MG: 25 TABLET ORAL at 20:03

## 2023-10-28 RX ADMIN — AZITHROMYCIN 500 MG: 500 TABLET, FILM COATED ORAL at 09:28

## 2023-10-28 RX ADMIN — GABAPENTIN 300 MG: 300 CAPSULE ORAL at 09:28

## 2023-10-28 RX ADMIN — GABAPENTIN 300 MG: 300 CAPSULE ORAL at 15:26

## 2023-10-28 RX ADMIN — IPRATROPIUM BROMIDE AND ALBUTEROL SULFATE 3 ML: 2.5; .5 SOLUTION RESPIRATORY (INHALATION) at 08:05

## 2023-10-28 RX ADMIN — METHYLPREDNISOLONE SODIUM SUCCINATE 40 MG: 40 INJECTION, POWDER, FOR SOLUTION INTRAMUSCULAR; INTRAVENOUS at 01:22

## 2023-10-28 RX ADMIN — POTASSIUM CHLORIDE 20 MEQ: 1500 TABLET, EXTENDED RELEASE ORAL at 09:28

## 2023-10-28 RX ADMIN — MONTELUKAST 10 MG: 10 TABLET, FILM COATED ORAL at 20:03

## 2023-10-28 RX ADMIN — IPRATROPIUM BROMIDE AND ALBUTEROL SULFATE 3 ML: .5; 3 SOLUTION RESPIRATORY (INHALATION) at 20:41

## 2023-10-28 RX ADMIN — METOPROLOL TARTRATE 25 MG: 25 TABLET, FILM COATED ORAL at 09:28

## 2023-10-28 RX ADMIN — FUROSEMIDE 40 MG: 40 TABLET ORAL at 09:28

## 2023-10-28 RX ADMIN — PREDNISONE 20 MG: 20 TABLET ORAL at 20:02

## 2023-10-28 RX ADMIN — METHYLPREDNISOLONE SODIUM SUCCINATE 40 MG: 40 INJECTION, POWDER, FOR SOLUTION INTRAMUSCULAR; INTRAVENOUS at 06:01

## 2023-10-28 RX ADMIN — ACETAMINOPHEN 650 MG: 325 TABLET ORAL at 09:57

## 2023-10-28 RX ADMIN — MECLIZINE HYDROCHLORIDE 25 MG: 25 TABLET ORAL at 15:27

## 2023-10-28 RX ADMIN — METOPROLOL TARTRATE 25 MG: 25 TABLET, FILM COATED ORAL at 20:02

## 2023-10-28 RX ADMIN — LEVOTHYROXINE SODIUM 125 MCG: 0.12 TABLET ORAL at 05:12

## 2023-10-28 RX ADMIN — LORATADINE 10 MG: 10 TABLET ORAL at 09:28

## 2023-10-28 ASSESSMENT — PAIN SCALES - GENERAL
PAINLEVEL_OUTOF10: 0 - NO PAIN
PAINLEVEL_OUTOF10: 3
PAINLEVEL_OUTOF10: 0 - NO PAIN

## 2023-10-28 ASSESSMENT — PAIN - FUNCTIONAL ASSESSMENT
PAIN_FUNCTIONAL_ASSESSMENT: FLACC (FACE, LEGS, ACTIVITY, CRY, CONSOLABILITY)
PAIN_FUNCTIONAL_ASSESSMENT: 0-10

## 2023-10-28 NOTE — PROGRESS NOTES
"Dixie Wilks is a 73 y.o. female on day 1 of admission presenting with HAYS (dyspnea on exertion).      Subjective   Patient sitting up in the chair.  Patient is dressed in seemingly in good spirits.  Patient does endorse a headache, has chronic migraines.  She denies having any dizziness lightheadedness chest pain or shortness of breath.  Patient afebrile       Objective     Last Recorded Vitals /50 (BP Location: Right arm, Patient Position: Lying)   Pulse 68   Temp 36.4 °C (97.5 °F) (Temporal)   Resp 16   Ht 1.651 m (5' 5\")   Wt 111 kg (245 lb 9.5 oz)   SpO2 95%   Breastfeeding No   BMI 40.87 kg/m²    /50 (BP Location: Right arm, Patient Position: Lying)   Pulse 68   Temp 36.4 °C (97.5 °F) (Temporal)   Resp 16   Wt 111 kg (245 lb 9.5 oz)   SpO2 95%   Breastfeeding No .      Intake/Output Summary (Last 24 hours) at 10/28/2023 1428  Last data filed at 10/28/2023 0800  Gross per 24 hour   Intake 250 ml   Output --   Net 250 ml      .cbc      Intake/Output Summary (Last 24 hours) at 10/28/2023 1428  Last data filed at 10/28/2023 0800  Gross per 24 hour   Intake 250 ml   Output --   Net 250 ml       Admission Weight  Weight: 112 kg (247 lb) (10/26/23 2117)    Daily Weight  10/28/23 : 111 kg (245 lb 9.5 oz)    Image Results  XR chest 1 view  Narrative: Interpreted By:  Sonal Jones,   STUDY:  XR CHEST 1 VIEW 10/26/2023 10:04 pm      INDICATION:  Signs/Symptoms:Chest Pain and dyspnea      COMPARISON:  09/27/2023      ACCESSION NUMBER(S):  [id number]      ORDERING CLINICIAN:  FRANCOIS SMITH      TECHNIQUE:  Portable PA view of the chest in the erect position      FINDINGS:  Cardiac size is normal. Lungs are clear with no pleural abnormality  seen. There are degenerative changes of the thoracic spine.      Impression: No acute cardiopulmonary disease.      Signed by: Sonal Jones 10/26/2023 10:09 PM  Dictation workstation:   [id number]      Physical Exam  Constitutional:       General: She is not in " acute distress.     Appearance: Normal appearance. She is obese.   HENT:      Head: Normocephalic.      Nose: Nose normal.      Mouth/Throat:      Mouth: Mucous membranes are moist.      Pharynx: Oropharynx is clear.   Eyes:      Pupils: Pupils are equal, round, and reactive to light.   Cardiovascular:      Rate and Rhythm: Normal rate and regular rhythm.      Heart sounds: No murmur heard.  Pulmonary:      Effort: Pulmonary effort is normal. No respiratory distress.      Breath sounds: No wheezing or rales.   Abdominal:      General: Bowel sounds are normal. There is no distension.      Palpations: Abdomen is soft.      Tenderness: There is no abdominal tenderness.   Musculoskeletal:         General: Normal range of motion.      Cervical back: Normal range of motion.      Right lower leg: No edema.      Left lower leg: No edema.   Skin:     Capillary Refill: Capillary refill takes less than 2 seconds.   Neurological:      General: No focal deficit present.      Mental Status: She is alert and oriented to person, place, and time. Mental status is at baseline.   Psychiatric:         Mood and Affect: Mood normal.         Behavior: Behavior normal.       [START ON 10/29/2023] azithromycin, 500 mg, intravenous, q24h  diphenhydrAMINE, 25 mg, oral, Daily  [START ON 11/2/2023] ergocalciferol, 1,250 mcg, oral, Weekly  fluticasone furoate-vilanteroL, 1 puff, inhalation, Daily  furosemide, 40 mg, oral, Daily  gabapentin, 300 mg, oral, TID  levothyroxine, 125 mcg, oral, Daily  loratadine, 10 mg, oral, Daily  magnesium oxide, 400 mg, oral, Daily  meclizine, 25 mg, oral, TID  metoprolol tartrate, 25 mg, oral, BID  montelukast, 10 mg, oral, Nightly  pantoprazole, 40 mg, oral, Daily  potassium chloride CR, 20 mEq, oral, BID  predniSONE, 20 mg, oral, BID   Followed by  [START ON 11/3/2023] predniSONE, 20 mg, oral, Daily   Followed by  [START ON 11/8/2023] predniSONE, 10 mg, oral, Daily       Relevant Results  Scheduled  medications  [START ON 10/29/2023] azithromycin, 500 mg, intravenous, q24h  diphenhydrAMINE, 25 mg, oral, Daily  [START ON 11/2/2023] ergocalciferol, 1,250 mcg, oral, Weekly  fluticasone furoate-vilanteroL, 1 puff, inhalation, Daily  furosemide, 40 mg, oral, Daily  gabapentin, 300 mg, oral, TID  levothyroxine, 125 mcg, oral, Daily  loratadine, 10 mg, oral, Daily  magnesium oxide, 400 mg, oral, Daily  meclizine, 25 mg, oral, TID  metoprolol tartrate, 25 mg, oral, BID  montelukast, 10 mg, oral, Nightly  pantoprazole, 40 mg, oral, Daily  potassium chloride CR, 20 mEq, oral, BID  predniSONE, 20 mg, oral, BID   Followed by  [START ON 11/3/2023] predniSONE, 20 mg, oral, Daily   Followed by  [START ON 11/8/2023] predniSONE, 10 mg, oral, Daily      Continuous medications     PRN medications  PRN medications: acetaminophen, butalbital-acetaminophen-caff, EPINEPHrine, guaiFENesin, ipratropium-albuteroL, ipratropium-albuteroL, ondansetron ODT    Results for orders placed or performed during the hospital encounter of 10/27/23 (from the past 24 hour(s))   CBC and Auto Differential   Result Value Ref Range    WBC 19.0 (H) 4.4 - 11.3 x10*3/uL    nRBC 0.0 0.0 - 0.0 /100 WBCs    RBC 4.16 4.00 - 5.20 x10*6/uL    Hemoglobin 12.0 12.0 - 16.0 g/dL    Hematocrit 37.2 36.0 - 46.0 %    MCV 89 80 - 100 fL    MCH 28.8 26.0 - 34.0 pg    MCHC 32.3 32.0 - 36.0 g/dL    RDW 12.7 11.5 - 14.5 %    Platelets 296 150 - 450 x10*3/uL    MPV 11.0 7.5 - 11.5 fL    Neutrophils % 89.6 40.0 - 80.0 %    Immature Granulocytes %, Automated 1.4 (H) 0.0 - 0.9 %    Lymphocytes % 6.4 13.0 - 44.0 %    Monocytes % 2.3 2.0 - 10.0 %    Eosinophils % 0.1 0.0 - 6.0 %    Basophils % 0.2 0.0 - 2.0 %    Neutrophils Absolute 17.02 (H) 1.60 - 5.50 x10*3/uL    Immature Granulocytes Absolute, Automated 0.27 0.00 - 0.50 x10*3/uL    Lymphocytes Absolute 1.22 0.80 - 3.00 x10*3/uL    Monocytes Absolute 0.43 0.05 - 0.80 x10*3/uL    Eosinophils Absolute 0.01 0.00 - 0.40 x10*3/uL     Basophils Absolute 0.03 0.00 - 0.10 x10*3/uL   Comprehensive Metabolic Panel   Result Value Ref Range    Glucose 205 (H) 65 - 99 mg/dL    Sodium 135 133 - 145 mmol/L    Potassium 4.2 3.4 - 5.1 mmol/L    Chloride 100 97 - 107 mmol/L    Bicarbonate 24 24 - 31 mmol/L    Urea Nitrogen 16 8 - 25 mg/dL    Creatinine 1.20 0.40 - 1.60 mg/dL    eGFR 48 (L) >60 mL/min/1.73m*2    Calcium 9.8 8.5 - 10.4 mg/dL    Albumin 3.5 3.5 - 5.0 g/dL    Alkaline Phosphatase 59 35 - 125 U/L    Total Protein 7.2 5.9 - 7.9 g/dL    AST 14 5 - 40 U/L    Bilirubin, Total 0.4 0.1 - 1.2 mg/dL    ALT 12 5 - 40 U/L    Anion Gap 11 <=19 mmol/L        Assessment/Plan       Principal Problem:    HAYS (dyspnea on exertion)  Active Problems:    Angioedema    Extrinsic asthma without complication    Plan  Pulmonary hygiene/incentive spirometry  Change to oral steroids  Prophylaxis  Collaborating with Dr. Krishnan  Discharge planning: Patient stable from a pulmonary standpoint okay to discharge.follow-up with Dr. Duffy in 2 weeks. patient can go home on a steroid taper.            Oziel Martinez, APRN-CNP

## 2023-10-28 NOTE — H&P
History Of Present Illness  Dixie Wilks is a 73 y.o. female presenting with shortness of breath and weakness dyspnea on exertion came to the hospital got admitted exhausted not feeling good.     Past Medical History  Past Medical History:   Diagnosis Date    Abnormal findings on diagnostic imaging of skull and head, not elsewhere classified 11/26/2019    Abnormal MRI of head    Angioedema     Arthritis     Body mass index (BMI) 38.0-38.9, adult     BMI 38.0-38.9,adult    Headache, unspecified 09/18/2019    Headache    High cholesterol     History of angioedema     Hypertension     Hypothyroidism     Low back pain, unspecified 06/25/2015    Acute low back pain due to trauma    Migraine with aura, not intractable, without status migrainosus 12/12/2022    Migraine with aura and without status migrainosus, not intractable    Nausea 10/22/2015    Nausea    Neuropathic pain     Obesity     Pain in unspecified hip 08/22/2016    Hip pain    Personal history of other diseases of the digestive system 08/14/2013    History of gastroenteritis    Personal history of other diseases of the respiratory system 11/17/2014    History of acute sinusitis    Personal history of other diseases of the respiratory system 07/27/2015    History of acute bronchitis    Personal history of other diseases of urinary system 10/10/2013    History of hematuria    Personal history of other infectious and parasitic diseases 09/30/2015    History of athlete's foot    Personal history of other infectious and parasitic diseases 11/04/2015    History of candidiasis of mouth    Personal history of other specified conditions 11/30/2016    History of abdominal pain    Personal history of other specified conditions 02/06/2014    History of insomnia    Personal history of other specified conditions 10/07/2013    History of abdominal pain    Personal history of other specified conditions 10/10/2013    History of urinary frequency    Personal history of other  specified conditions 10/22/2013    History of dizziness    Personal history of other specified conditions 10/22/2013    History of syncope    Postmenopausal        Surgical History  Past Surgical History:   Procedure Laterality Date    KNEE SURGERY  09/23/2013    Knee Surgery    MR HEAD ANGIO WO IV CONTRAST  9/8/2021    MR HEAD ANGIO WO IV CONTRAST [facility]    OTHER SURGICAL HISTORY  09/23/2013    Biopsy Tongue    OTHER SURGICAL HISTORY  09/23/2013    Gynecologic Laparoscopy With Adhesiolysis    TONSILLECTOMY  02/09/2015    Tonsillectomy        Social History  She reports that she has never smoked. She has never used smokeless tobacco. She reports that she does not drink alcohol and does not use drugs.    Family History  Family History   Problem Relation Name Age of Onset    Colon cancer Mother          stage 1    Rheum arthritis Mother      Other (sinua problem) Mother      Other (cardiac disorder) Father      Other (heart problem) Father      No Known Problems Brother      Autoimmune disease Daughter      Esophageal cancer Son      No Known Problems Mother's Sister      Angioedema Other      Hypertension Other      Hyperlipidemia Other          Allergies  Dexamethasone, Formaldehyde, Levofloxacin, Cefepime, Colchicine, Hydromorphone, Indomethacin, Penicillins, Sulfa (sulfonamide antibiotics), Methotrexate, Other, Ubrelvy [ubrogepant], and Prochlorperazine    Review of Systems  I reviewed all systems reviewed as above otherwise is negative.  Physical Exam  HEENT:  Head externally atraumatic, no pallor, no icterus, extraocular movements intact, pupils reactive to light, oral mucosa moist and throat clear.  Neck:  Supple, no JVP, no palpable adenopathy or thyromegaly.  No carotid bruit.  Chest:  Clear to auscultation and resonant.  Breath sounds diminished labored accessory muscles working  Heart:  Regular rate and rhythm, no murmur or gallop could be appreciated.  Abdomen:  Soft, nontender, bowel sounds  "present, normoactive, no palpable hepatosplenomegaly.  Extremities:  No edema, pulses present, no cyanosis or clubbing.  CNS:  Patient alert, oriented to time, place and person.  Power 5/5 all over and deep tendon reflexes symmetrical, cranial nerves 2-12 grossly intact.  Skin:  No active rash.  Musculoskeletal:  No joint swelling or erythema, range of movement normal.  Last Recorded Vitals  Heart Rate:  [68-87]   Temp:  [36 °C (96.8 °F)-36.8 °C (98.2 °F)]   Resp:  [17-20]   BP: (137-161)/(59-98)   Height:  [165.1 cm (5' 5\")]   Weight:  [112 kg (247 lb)]   SpO2:  [90 %-98 %]       Relevant Results        Results for orders placed or performed during the hospital encounter of 10/27/23 (from the past 24 hour(s))   CBC with Differential   Result Value Ref Range    WBC 14.2 (H) 4.4 - 11.3 x10*3/uL    nRBC 0.0 0.0 - 0.0 /100 WBCs    RBC 4.53 4.00 - 5.20 x10*6/uL    Hemoglobin 13.1 12.0 - 16.0 g/dL    Hematocrit 40.4 36.0 - 46.0 %    MCV 89 80 - 100 fL    MCH 28.9 26.0 - 34.0 pg    MCHC 32.4 32.0 - 36.0 g/dL    RDW 12.6 11.5 - 14.5 %    Platelets 397 150 - 450 x10*3/uL    MPV 9.5 7.5 - 11.5 fL    Neutrophils % 70.9 40.0 - 80.0 %    Immature Granulocytes %, Automated 0.4 0.0 - 0.9 %    Lymphocytes % 18.4 13.0 - 44.0 %    Monocytes % 8.0 2.0 - 10.0 %    Eosinophils % 1.7 0.0 - 6.0 %    Basophils % 0.6 0.0 - 2.0 %    Neutrophils Absolute 10.09 (H) 1.60 - 5.50 x10*3/uL    Immature Granulocytes Absolute, Automated 0.05 0.00 - 0.50 x10*3/uL    Lymphocytes Absolute 2.62 0.80 - 3.00 x10*3/uL    Monocytes Absolute 1.14 (H) 0.05 - 0.80 x10*3/uL    Eosinophils Absolute 0.24 0.00 - 0.40 x10*3/uL    Basophils Absolute 0.09 0.00 - 0.10 x10*3/uL   Comprehensive Metabolic Panel   Result Value Ref Range    Glucose 123 (H) 65 - 99 mg/dL    Sodium 138 133 - 145 mmol/L    Potassium 3.6 3.4 - 5.1 mmol/L    Chloride 101 97 - 107 mmol/L    Bicarbonate 26 24 - 31 mmol/L    Urea Nitrogen 11 8 - 25 mg/dL    Creatinine 1.10 0.40 - 1.60 mg/dL    " eGFR 53 (L) >60 mL/min/1.73m*2    Calcium 9.3 8.5 - 10.4 mg/dL    Albumin 3.9 3.5 - 5.0 g/dL    Alkaline Phosphatase 63 35 - 125 U/L    Total Protein 7.3 5.9 - 7.9 g/dL    AST 17 5 - 40 U/L    Bilirubin, Total 0.5 0.1 - 1.2 mg/dL    ALT 14 5 - 40 U/L    Anion Gap 11 <=19 mmol/L   Troponin T, High Sensitivity   Result Value Ref Range    Troponin T, High Sensitivity 26 (H) <=15 ng/L     Prior to Admission medications    Medication Sig Start Date End Date Taking? Authorizing Provider   albuterol 90 mcg/actuation inhaler Inhale 2 puffs 3 times a day. 5/25/23   Yarelis Blanco MD   azithromycin (Zithromax) 500 mg tablet Take 1 tablet (500 mg) by mouth once daily for 5 days. 10/20/23 10/25/23  Yarelis Blanco MD   budesonide-formoteroL (Symbicort) 80-4.5 mcg/actuation inhaler Inhale 2 puffs 2 times a day. Rinse mouth with water after use to reduce aftertaste and incidence of candidiasis. Do not swallow. 8/9/23   Yarelis Blanco MD   butalbital-acetaminophen-caff -40 mg tablet Take 2 tablets by mouth once daily as needed for headaches or migraine. 10/5/23   Magdy Anderson MD   diphenhydrAMINE (Benadryl Allergy) 25 mg tablet Take by mouth once daily. 1/12/15   Historical Provider, MD   EPINEPHrine 0.3 mg/0.3 mL injection syringe INJECT INTRAMUSCULARLY AS DIRECTED. 9/11/23   Yarelis Blanco MD   ergocalciferol (Vitamin D-2) 1.25 MG (91869 UT) capsule Take 1 capsule (1,250 mcg) by mouth 2 times a day.    Historical Provider, MD   fexofenadine (Allegra Allergy) 180 mg tablet Take 1 tablet (180 mg) by mouth once daily. 8/28/23   Yarelis Blanco MD   fluticasone furoate-vilanteroL (Breo Ellipta) 100-25 mcg/dose inhaler Inhale 1 puff once daily. Rinse and gargle with water afterwards 10/23/23   Walt Berg MD   furosemide (Lasix) 40 mg tablet Take 1 tablet (40 mg) by mouth once daily. 9/11/23 9/10/24  Yarelis Blanco MD   gabapentin (Neurontin) 300 mg capsule Take 1 capsule (300 mg) by mouth in the morning  and 1 capsule (300 mg) in the evening and 1 capsule (300 mg) before bedtime. 3/13/23   Yarelis Blanco MD   ipratropium-albuteroL (Duo-Neb) 0.5-2.5 mg/3 mL nebulizer solution Take 3 mL by nebulization 4 times a day as needed for wheezing or shortness of breath. 10/12/23 10/11/24  Yarelis Blanco MD   levothyroxine (Synthroid, Levoxyl) 125 mcg tablet Take 1 tablet (125 mcg) by mouth once daily. 9/11/23   Yarelis Blanco MD   loratadine (Claritin) 10 mg tablet Take 1 tablet (10 mg) by mouth once daily. 8/9/23 11/7/23  Yarelis Blanco MD   magnesium oxide (Mag-Ox) 400 mg (241.3 mg magnesium) tablet Take 1 tablet (400 mg) by mouth once daily.  Patient not taking: Reported on 10/27/2023 9/11/23 9/10/24  Yarelis Blanco MD   meclizine (Antivert) 25 mg tablet Take 1 tablet (25 mg) by mouth 3 times a day. 9/7/21   Historical Provider, MD   metoprolol tartrate (Lopressor) 25 mg tablet TAKE 1 TABLET BY MOUTH TWICE  DAILY 6/26/23   Yarelis Blanco MD   montelukast (Singulair) 10 mg tablet Take 1 tablet (10 mg) by mouth once daily at bedtime. 9/11/23 3/9/24  Yarelis Blanco MD   nebulizer accessories misc Use four times daily 10/12/23   Yarelis Blanco MD   nebulizer and compressor device Use as directed 10/12/23   Yarelis Blanco MD   ondansetron ODT (Zofran-ODT) 4 mg disintegrating tablet Take 1 tablet (4 mg) by mouth every 8 hours if needed for nausea or vomiting. 10/5/23 11/24/23  Magdy Anderson MD   pantoprazole (ProtoNix) 40 mg EC tablet Take 1 tablet (40 mg) by mouth once daily. 10/4/23   Yarelis Blanco MD   potassium chloride CR (Klor-Con M20) 20 mEq ER tablet Take 1 tablet (20 mEq) by mouth 2 times a day. Do not crush or chew. 9/11/23 3/9/24  Yarelis Blanco MD       Current Facility-Administered Medications:     acetaminophen (Tylenol) tablet 650 mg, 650 mg, oral, q6h PRN, Candy Blanco MD    azithromycin (Zithromax) in dextrose 5 % in water (D5W) 250 mL  mg, 500 mg, intravenous, q24h, Candy Blanco,  MD, Stopped at 10/27/23 0951    [START ON 10/28/2023] azithromycin (Zithromax) tablet 500 mg, 500 mg, oral, Daily, Yarelis Blanco MD    butalbital-acetaminophen-caff -40 mg per tablet 2 tablet, 2 tablet, oral, Daily PRN, Yarelis Blanco MD, 2 tablet at 10/27/23 1111    diphenhydrAMINE (Sominex) tablet 25 mg, 25 mg, oral, Daily, Yarelis Blanco MD    EPINEPHrine (Epipen) injection syringe 0.3 mg, 0.3 mg, intramuscular, PRN, Yarelis Blanco MD    [START ON 11/2/2023] ergocalciferol (Vitamin D-2) capsule 1,250 mcg, 1,250 mcg, oral, Weekly, Yarelis Blanco MD    fluticasone furoate-vilanteroL (Breo Ellipta) 100-25 mcg/dose inhaler 1 puff, 1 puff, inhalation, Daily, Yarelis Blanco MD    furosemide (Lasix) tablet 40 mg, 40 mg, oral, Daily, Yarelis Blanco MD, 40 mg at 10/27/23 1116    gabapentin (Neurontin) capsule 300 mg, 300 mg, oral, TID, Yarelis Blanco MD, 300 mg at 10/27/23 1548    guaiFENesin (Robitussin) 100 mg/5 mL syrup 10 mL, 10 mL, oral, q4h PRN, Candy Blanco MD    ipratropium-albuteroL (Duo-Neb) 0.5-2.5 mg/3 mL nebulizer solution 3 mL, 3 mL, nebulization, TID PRN, Reno Martin MD    ipratropium-albuteroL (Duo-Neb) 0.5-2.5 mg/3 mL nebulizer solution 3 mL, 3 mL, nebulization, 4x daily PRN, Yarelis Blanco MD, 3 mL at 10/27/23 1248    levothyroxine (Synthroid, Levoxyl) tablet 125 mcg, 125 mcg, oral, Daily, Yarelis Blanco MD, 125 mcg at 10/27/23 1116    loratadine (Claritin) tablet 10 mg, 10 mg, oral, Daily, Yarelis Blanco MD, 10 mg at 10/27/23 1116    magnesium oxide (Mag-Ox) tablet 400 mg, 400 mg, oral, Daily, Yarelis Blanco MD, 400 mg at 10/27/23 1116    meclizine (Antivert) tablet 25 mg, 25 mg, oral, TID, Yarelis Blanco MD, 25 mg at 10/27/23 1548    methylPREDNISolone sod succinate (PF) (SOLU-Medrol) 40 mg/mL injection 40 mg, 40 mg, intravenous, q6h, Candy Blanco MD, 40 mg at 10/27/23 1822    metoprolol tartrate (Lopressor) tablet 25 mg, 25 mg, oral, BID, Yarelis Blanco MD, 25 mg  at 10/27/23 1116    montelukast (Singulair) tablet 10 mg, 10 mg, oral, Nightly, Ronak Monk MD    ondansetron ODT (Zofran-ODT) disintegrating tablet 4 mg, 4 mg, oral, q8h PRN, Ronak Monk MD    pantoprazole (ProtoNix) EC tablet 40 mg, 40 mg, oral, Daily, Ronak Monk MD, 40 mg at 10/27/23 1116    potassium chloride CR (Klor-Con M20) ER tablet 20 mEq, 20 mEq, oral, BID, Ronak Monk MD, 20 mEq at 10/27/23 1116  XR chest 1 view    Result Date: 10/26/2023  Interpreted By:  Sonal Jones, STUDY: XR CHEST 1 VIEW 10/26/2023 10:04 pm   INDICATION: Signs/Symptoms:Chest Pain and dyspnea   COMPARISON: 09/27/2023   ACCESSION NUMBER(S): [id number]   ORDERING CLINICIAN: FRANCOIS SMITH   TECHNIQUE: Portable PA view of the chest in the erect position   FINDINGS: Cardiac size is normal. Lungs are clear with no pleural abnormality seen. There are degenerative changes of the thoracic spine.       No acute cardiopulmonary disease.   Signed by: Sonal Jones 10/26/2023 10:09 PM Dictation workstation:   [id number]    BI US breast limited right    Result Date: 10/17/2023  Interpreted By:  Francois Alvarado, STUDY: BI US BREAST LIMITED RIGHT; 10/17/2023 11:40 am   INDICATION: Signs/Symptoms:breast lump. CT chest dated 10/11/2023 described a 1.7 cm spiculated density within the right breast for which correlation with mammography and physical exam was suggested. Patient has been referred for ultrasound evaluation.   COMPARISON: Numerous previous mammograms dating back to at least 2016 and CT chest from 10/11/2023   ACCESSION NUMBER(S): [id number]   ORDERING CLINICIAN: RONAK MONK   TECHNIQUE: Grey scale duplex image of the breast tissue was obtained.   FINDINGS: Exam was targeted to the area of concern noted on CT chest. This area corresponds with an area of postsurgical architectural distortion upper-outer quadrant right breast anterior depth on previous mammographic imaging. Ultrasound evaluation of this area demonstrates  extensive architectural distortion with a hypoechoic acoustic shadowing and increased stiffness although there is no internal vascularity. This is thought to be secondary to postsurgical scar predominantly based on the mammographic correlation. No other finding is noted on this exam.       Finding on CT chest is thought to correspond with a stable postsurgical scar, unchanged dating back to 2016 mammographically. The ultrasound appearance is consistent with a scar noted mammographically.   MACRO: BI-RADS Category:  2 Benign. Recommendation:  Routine Screening Mammogram in 1 Year. Recommended Date:  N/A. Laterality:  Bilateral.   Signed by: Isaias Alvarado 10/17/2023 11:47 AM Dictation workstation:   [id number]    CT chest high resolution    Result Date: 10/11/2023  Interpreted By:  Kirk Joaquin and Maltbie Grace STUDY: CT CHEST HIGH RESOLUTION;  10/11/2023 2:05 pm   INDICATION: Signs/Symptoms:pulmonary fibrosis. Patient is a 73-year-old lady with shortness of breath and dyspnea on exertion.   COMPARISON: CT cardiac scoring 05/24/2023, and multiple CTs dating back to 10/10/2013 Mammogram 07/03/2023   ACCESSION NUMBER(S): [id number]   ORDERING CLINICIAN: RONAK MONK   TECHNIQUE: Using helical multidetector technique, volumetric data acquisition of the chest was obtained without intravenous administration of contrast material under the high resolution chest CT protocol. Examination includes contiguous slices through the chest in supine positioning during inspiration, noncontiguous axial slices in supine positioning during expiration and prone positioning during inspiration.   FINDINGS: LUNGS AND AIRWAYS: The trachea and central airways are patent. No endobronchial lesion is seen.   The bilateral lungs are clear without evidence of focal consolidation, pleural effusion, or pneumothorax. Mild bandlike bibasilar scarring. Otherwise, there is no evidence of interstitial lung disease. Expiratory imaging demonstrates  no evidence of air-trapping. Prone imaging reveals persistent of mild bandlike bibasilar scarring.   MEDIASTINUM AND ALESHIA, LOWER NECK AND AXILLA: The visualized thyroid gland is within normal limits. No evidence of thoracic lymphadenopathy by CT criteria. Esophagus appears within normal limits as seen.   HEART AND VESSELS: The thoracic aorta normal in course and caliber.There is minimal scattered calcified atherosclerosis present. Main pulmonary artery and its branches are normal in caliber. Mild coronary artery calcifications are seen. Please note, the study is not optimized for evaluation of coronary arteries. The cardiac chambers are not enlarged. Mild mitral annular calcifications are present. There is no pericardial effusion seen.   UPPER ABDOMEN: The visualized subdiaphragmatic structures demonstrate no remarkable findings.   CHEST WALL AND OSSEOUS STRUCTURES: Chest wall is within normal limits. An approximately 1.7 cm spiculated soft tissue nodule seen in the right breast (axial image 95 of 299, series 601); please correlate with recent mammogram and physical exam. No acute osseous pathology.There are no suspicious osseous lesions. Mild multilevel degenerative changes within visualized spine.       1. No acute pathology. No evidence of interstitial lung disease or expiratory air trapping. 2. Mild coronary artery calcifications. Please note that, the present study is not tailored for evaluation of coronary arteries and correlate with patient's symptoms and cardiovascular risk factors. 3. A nonspecific spiculated soft tissue nodule in the right breast, please correlate with recent mammogram and physical examination.   I personally reviewed the images/study and I agree with the findings as stated by radiology resident Felisa Gonzales MD. This study was interpreted at [place], [place].   Signed by: Kirk Joaquin 10/11/2023 3:35 PM Dictation workstation:    [id number]    Echocardiogram    Result Date: 9/30/2023  PROCEDURE:         ECHO 2-D WO CONTRAST - IEC  0010 REASON FOR EXAM: Chest Pain RESULT: [facility] [address], [place] [address] Phone [phone] TRANSTHORACIC ECHOCARDIOGRAM REPORT Patient Name:     CANDE GRAF     Reading Physician:  [id number] Felix Gibson MD Study Date:       9/28/2023          Referring           [id number] [place] Blanco Physician:          MD MRN/PID:          [id number]           PCP: Accession/Order#: [id number]         [facility] Location: YOB: 1950          Fellow: Gender:           F                  Nurse: Admit Date:       9/28/2023          Sonographer:        Yue Dow RDCS Admission Status: Inpatient -        Additional Staff: Routine Height:           167.00 cm          CC Report to: Weight:           111.00 kg          Study Type:         ECHO 2-D WO CONTRAST BSA:              2.17 m2 Diagnosis/ICD: I50.20-Unspecified systolic (congestive) heart failure (CHF) Indication: Procedure/CPT: Echo Complete w Full [id number] Patient History: Pertinent History: CHF. Study Detail: The following Echo studies were performed: 2D, M-Mode, Doppler and color flow. PHYSICIAN INTERPRETATION: Left Ventricle: Left ventricular systolic function is normal, with an estimated ejection fraction of 55-60%. There are no regional wall motion abnormalities. The left ventricular cavity size is normal. There is mild concentric left ventricular hypertrophy. Spectral Doppler shows a normal pattern of left ventricular diastolic filling. Left Atrium: The left atrium is mild to moderately dilated. Right Ventricle: The right ventricle is normal in size. There is normal right ventriclar wall thickness. There is normal right ventricular global systolic function. Right Atrium: The right atrium is normal in size. Aortic Valve: The aortic valve appears structurally normal. The aortic valve appears  tricuspid and non-restricted. There is no evidence of aortic valve regurgitation. The peak instantaneous gradient of the aortic valve is 10.1 mmHg. The mean gradient of the aortic valve is 5.0 mmHg. Mitral Valve: The mitral valve is normal in structure. There is normal mitral valve leaflet mobility. There is mild mitral annular calcification. There is trace mitral valve regurgitation. Tricuspid Valve: The tricuspid valve is structurally normal. There is normal tricuspid valve leaflet mobility. There is trace tricuspid regurgitation. The right ventricular systolic pressure is unable to be estimated. Pulmonic Valve: The pulmonic valve is structurally normal. There is no indication of pulmonic valve regurgitation. Pericardium: There is no pericardial effusion noted. There is a pericardial fat pad present. Aorta: The aortic root is normal. There is no dilatation of the aortic arch. There is no dilatation of the ascending aorta. The aortic root is at the upper limits of normal size. CONCLUSIONS: 1. Left ventricular systolic function is normal with a 55-60% estimated ejection fraction. 2. The left atrium is mild to moderately dilated. 3. Trace mitral valve regurgitation. QUANTITATIVE DATA SUMMARY: 2D MEASUREMENTS: Normal Ranges: IVSd:          0.99 cm   (0.6-1.1cm) LVPWd:         1.03 cm   (0.6-1.1cm) LVIDd:         4.03 cm   (3.9-5.9cm) LVIDs:         2.83 cm LV Mass Index: 60.1 g/m2 LV % FS        29.8 % LA VOLUME: Normal Ranges: LA Vol A4C:        55.4 ml   (22+/-6mL/m2) LA Vol Index A4C:  25.5ml/m2 LA Area A4C:       19.3 cm2 LA Major Axis A4C: 5.7 cm LA Vol A4C:        51.7 ml RA VOLUME BY A/L METHOD: Normal Ranges: RA Vol A4C:        36.1 ml    (8.3-19.5ml) RA Vol Index A4C:  16.6 ml/m2 RA Area A4C:       13.5 cm2 RA Major Axis A4C: 4.3 cm M-MODE MEASUREMENTS: Normal Ranges: Ao Root: 3.50 cm (2.0-3.7cm) AORTA MEASUREMENTS: Normal Ranges: Asc Ao, d: 3.13 cm (2.1-3.4cm) LV SYSTOLIC FUNCTION BY 2D PLANIMETRY (MOD):  Normal Ranges: EF-A4C View: 58.7 % (>=55%) LV DIASTOLIC FUNCTION: Normal Ranges: MV Peak E:    0.84 m/s (0.7-1.2 m/s) MV Peak A:    0.89 m/s (0.42-0.7 m/s) E/A Ratio:    0.94     (1.0-2.2) MV lateral e' 0.08 m/s MV medial e'  0.05 m/s MITRAL VALVE: Normal Ranges: MV DT: 219 msec (150-240msec) AORTIC VALVE: Normal Ranges: AoV Vmax:                1.59 m/s  (<=1.7m/s) AoV Peak PG:             10.1 mmHg (<20mmHg) AoV Mean P.0 mmHg  (1.7-11.5mmHg) LVOT Max Melchor:            1.10 m/s  (<=1.1m/s) AoV VTI:                 35.30 cm  (18-25cm) LVOT VTI:                26.50 cm LVOT Diameter:           2.00 cm   (1.8-2.4cm) AoV Area, VTI:           2.36 cm2  (2.5-5.5cm2) AoV Area,Vmax:           2.17 cm2  (2.5-4.5cm2) AoV Dimensionless Index: 0.75 RIGHT VENTRICLE: TAPSE: 13.2 mm RV s'  0.09 m/s [id number] Felix Ames MD Electronically signed on 2023 at 8:14:03 AM ** Final ** 58.7 Original Interpreting Physician:   FELIX AMES M.D. Original Transcribed by/Date: D[place]AS Sep 28 2023 12:46P Original Electronically Signed by/Date: FELIX MAES M.D. Sep 30 2023  8:14A Addendum Interpreting Physician: Addendum Transcribed by/Date: NO ADDENDUM Addendum Electronically Signed by/Date:     No results found for the last 90 days.       Assessment/Plan   Principal Problem:    HAYS (dyspnea on exertion)  Active Problems:    Angioedema    Extrinsic asthma without complication  Steroid IV with IV antibiotic pulmonary consult        Angioedema stable    Hypertension medication      Hyperlipidemia medication    Obesity    Monitor    PT OT    Follow             Yarelis Blanco MD

## 2023-10-28 NOTE — PROGRESS NOTES
Dixie Wilks is a 73 y.o. female on day 1 of admission presenting with HAYS (dyspnea on exertion).  Improvement slowly change to oral steroid wants to go  Subjective   Of breath improved       Objective   Breath sounds improved  Physical Exam  HEENT:  Head externally atraumatic, no pallor, no icterus, extraocular movements intact, pulls reacting to light, oral mucosa moist and throat clear.  Neck:  Supple, no JVP, no palpable adenopathy or thyromegaly.  No carotid bruit.  Chest:  Clear to auscultation and resonant.  Breath sounds improved  Heart:  Regular rate and rhythm, no murmur or gallop could be appreciated.  Abdomen:  Soft, nontender, bowel sounds present, normoactive, no palpable hepatosplenomegaly.  Extremities:  No edema, pulses present, no cyanosis or clubbing.  CNS:  Patient alert, oriented to time, place and person.  Power 5/5 all over and deep tendon reflexes symmetrical, cranial nerves 2-12 grossly intact.  Skin:  No active rash.  Musculoskeletal:  No joint swelling or erythema, range of movement normal.  Last Recorded Vitals  Heart Rate:  [68-86]   Temp:  [36.4 °C (97.5 °F)-36.6 °C (97.9 °F)]   Resp:  [16-17]   BP: (116-137)/(43-68)   Weight:  [111 kg (245 lb 9.5 oz)]   SpO2:  [90 %-95 %]     Intake/Output last 3 Shifts:  I/O last 3 completed shifts:  In: 480 (4.3 mL/kg) [P.O.:480]  Out: 1 (0 mL/kg) [Urine:1 (0 mL/kg/hr)]  Weight: 112 kg     Relevant Results  No results found for the last 90 days.    Results for orders placed or performed during the hospital encounter of 10/27/23 (from the past 24 hour(s))   CBC and Auto Differential   Result Value Ref Range    WBC 19.0 (H) 4.4 - 11.3 x10*3/uL    nRBC 0.0 0.0 - 0.0 /100 WBCs    RBC 4.16 4.00 - 5.20 x10*6/uL    Hemoglobin 12.0 12.0 - 16.0 g/dL    Hematocrit 37.2 36.0 - 46.0 %    MCV 89 80 - 100 fL    MCH 28.8 26.0 - 34.0 pg    MCHC 32.3 32.0 - 36.0 g/dL    RDW 12.7 11.5 - 14.5 %    Platelets 296 150 - 450 x10*3/uL    MPV 11.0 7.5 - 11.5 fL    Neutrophils  % 89.6 40.0 - 80.0 %    Immature Granulocytes %, Automated 1.4 (H) 0.0 - 0.9 %    Lymphocytes % 6.4 13.0 - 44.0 %    Monocytes % 2.3 2.0 - 10.0 %    Eosinophils % 0.1 0.0 - 6.0 %    Basophils % 0.2 0.0 - 2.0 %    Neutrophils Absolute 17.02 (H) 1.60 - 5.50 x10*3/uL    Immature Granulocytes Absolute, Automated 0.27 0.00 - 0.50 x10*3/uL    Lymphocytes Absolute 1.22 0.80 - 3.00 x10*3/uL    Monocytes Absolute 0.43 0.05 - 0.80 x10*3/uL    Eosinophils Absolute 0.01 0.00 - 0.40 x10*3/uL    Basophils Absolute 0.03 0.00 - 0.10 x10*3/uL   Comprehensive Metabolic Panel   Result Value Ref Range    Glucose 205 (H) 65 - 99 mg/dL    Sodium 135 133 - 145 mmol/L    Potassium 4.2 3.4 - 5.1 mmol/L    Chloride 100 97 - 107 mmol/L    Bicarbonate 24 24 - 31 mmol/L    Urea Nitrogen 16 8 - 25 mg/dL    Creatinine 1.20 0.40 - 1.60 mg/dL    eGFR 48 (L) >60 mL/min/1.73m*2    Calcium 9.8 8.5 - 10.4 mg/dL    Albumin 3.5 3.5 - 5.0 g/dL    Alkaline Phosphatase 59 35 - 125 U/L    Total Protein 7.2 5.9 - 7.9 g/dL    AST 14 5 - 40 U/L    Bilirubin, Total 0.4 0.1 - 1.2 mg/dL    ALT 12 5 - 40 U/L    Anion Gap 11 <=19 mmol/L        Current Facility-Administered Medications:     acetaminophen (Tylenol) tablet 650 mg, 650 mg, oral, q6h PRN, Candy Blanco MD, 650 mg at 10/28/23 0957    [START ON 10/29/2023] azithromycin (Zithromax) in dextrose 5 % in water (D5W) 250 mL  mg, 500 mg, intravenous, q24h, Candy Blanco MD    butalbital-acetaminophen-caff -40 mg per tablet 2 tablet, 2 tablet, oral, Daily PRN, Yarelis Blanco MD, 2 tablet at 10/27/23 1111    diphenhydrAMINE (Sominex) tablet 25 mg, 25 mg, oral, Daily, Yarelis Blanco MD, 25 mg at 10/28/23 0512    EPINEPHrine (Epipen) injection syringe 0.3 mg, 0.3 mg, intramuscular, PRN, Yarelis Blanco MD    [START ON 11/2/2023] ergocalciferol (Vitamin D-2) capsule 1,250 mcg, 1,250 mcg, oral, Weekly, Yarelis Blanco MD    fluticasone furoate-vilanteroL (Breo Ellipta) 100-25 mcg/dose inhaler 1 puff, 1  puff, inhalation, Daily, Yarelis Blanco MD    furosemide (Lasix) tablet 40 mg, 40 mg, oral, Daily, Yarelis Blanco MD, 40 mg at 10/28/23 0928    gabapentin (Neurontin) capsule 300 mg, 300 mg, oral, TID, Yarelis Blanco MD, 300 mg at 10/28/23 1526    guaiFENesin (Robitussin) 100 mg/5 mL syrup 10 mL, 10 mL, oral, q4h PRN, Candy Blanco MD    ipratropium-albuteroL (Duo-Neb) 0.5-2.5 mg/3 mL nebulizer solution 3 mL, 3 mL, nebulization, TID PRN, Reno Martin MD    ipratropium-albuteroL (Duo-Neb) 0.5-2.5 mg/3 mL nebulizer solution 3 mL, 3 mL, nebulization, 4x daily PRN, Yarelis Blanco MD, 3 mL at 10/28/23 0805    levothyroxine (Synthroid, Levoxyl) tablet 125 mcg, 125 mcg, oral, Daily, Yarelis Blanco MD, 125 mcg at 10/28/23 0512    loratadine (Claritin) tablet 10 mg, 10 mg, oral, Daily, Yarelis Blanco MD, 10 mg at 10/28/23 0928    magnesium oxide (Mag-Ox) tablet 400 mg, 400 mg, oral, Daily, Yarelis Blanco MD, 400 mg at 10/28/23 0928    meclizine (Antivert) tablet 25 mg, 25 mg, oral, TID, Yarelis Blanco MD, 25 mg at 10/28/23 1527    metoprolol tartrate (Lopressor) tablet 25 mg, 25 mg, oral, BID, Yarelis Blanco MD, 25 mg at 10/28/23 0928    montelukast (Singulair) tablet 10 mg, 10 mg, oral, Nightly, Yarelis Blanco MD, 10 mg at 10/27/23 2050    ondansetron ODT (Zofran-ODT) disintegrating tablet 4 mg, 4 mg, oral, q8h PRN, Yarelis Blanco MD    pantoprazole (ProtoNix) EC tablet 40 mg, 40 mg, oral, Daily, Yarelis Blanco MD, 40 mg at 10/28/23 0928    potassium chloride CR (Klor-Con M20) ER tablet 20 mEq, 20 mEq, oral, BID, Yarelis Blanco MD, 20 mEq at 10/28/23 0928    predniSONE (Deltasone) tablet 20 mg, 20 mg, oral, BID **FOLLOWED BY** [START ON 11/3/2023] predniSONE (Deltasone) tablet 20 mg, 20 mg, oral, Daily **FOLLOWED BY** [START ON 11/8/2023] predniSONE (Deltasone) tablet 10 mg, 10 mg, oral, Daily, CHIQUITA Hanley-CNP   Assessment/Plan   Principal Problem:    HAYS (dyspnea on exertion)  Active  Problems:    Angioedema    Extrinsic asthma without complication    Edema    Lasix potassium    Weakness PT OT    Bronchitis stable    Hypertension okay      Monitor         Yarelis Blanco MD

## 2023-10-29 VITALS
TEMPERATURE: 98.6 F | OXYGEN SATURATION: 94 % | DIASTOLIC BLOOD PRESSURE: 74 MMHG | WEIGHT: 244.71 LBS | RESPIRATION RATE: 18 BRPM | HEART RATE: 66 BPM | BODY MASS INDEX: 40.77 KG/M2 | SYSTOLIC BLOOD PRESSURE: 131 MMHG | HEIGHT: 65 IN

## 2023-10-29 PROCEDURE — 99238 HOSP IP/OBS DSCHRG MGMT 30/<: CPT | Performed by: INTERNAL MEDICINE

## 2023-10-29 PROCEDURE — 2500000001 HC RX 250 WO HCPCS SELF ADMINISTERED DRUGS (ALT 637 FOR MEDICARE OP): Performed by: INTERNAL MEDICINE

## 2023-10-29 PROCEDURE — 2500000004 HC RX 250 GENERAL PHARMACY W/ HCPCS (ALT 636 FOR OP/ED): Performed by: INTERNAL MEDICINE

## 2023-10-29 PROCEDURE — 9420000001 HC RT PATIENT EDUCATION 5 MIN

## 2023-10-29 PROCEDURE — 2500000002 HC RX 250 W HCPCS SELF ADMINISTERED DRUGS (ALT 637 FOR MEDICARE OP, ALT 636 FOR OP/ED): Performed by: INTERNAL MEDICINE

## 2023-10-29 PROCEDURE — 2500000004 HC RX 250 GENERAL PHARMACY W/ HCPCS (ALT 636 FOR OP/ED)

## 2023-10-29 PROCEDURE — 94760 N-INVAS EAR/PLS OXIMETRY 1: CPT

## 2023-10-29 PROCEDURE — 94640 AIRWAY INHALATION TREATMENT: CPT

## 2023-10-29 PROCEDURE — 3490 HC RX 250 GENERAL PHARMACY W/ HCPCS (ALT 636 FOR OP/ED): Performed by: INTERNAL MEDICINE

## 2023-10-29 RX ORDER — PREDNISONE 20 MG/1
20 TABLET ORAL DAILY
Qty: 5 TABLET | Refills: 0 | Status: SHIPPED | OUTPATIENT
Start: 2023-11-03 | End: 2023-11-08

## 2023-10-29 RX ORDER — GUAIFENESIN 100 MG/5ML
10 SOLUTION ORAL EVERY 4 HOURS PRN
Qty: 120 ML | Refills: 0 | Status: SHIPPED | OUTPATIENT
Start: 2023-10-29 | End: 2024-01-22 | Stop reason: ALTCHOICE

## 2023-10-29 RX ORDER — FLUTICASONE FUROATE AND VILANTEROL 200; 25 UG/1; UG/1
1 POWDER RESPIRATORY (INHALATION)
Qty: 1 EACH | Refills: 0 | Status: SHIPPED | OUTPATIENT
Start: 2023-10-29

## 2023-10-29 RX ORDER — PREDNISONE 20 MG/1
20 TABLET ORAL 2 TIMES DAILY
Qty: 8 TABLET | Refills: 0 | Status: SHIPPED | OUTPATIENT
Start: 2023-10-29 | End: 2023-11-02

## 2023-10-29 RX ORDER — PREDNISONE 10 MG/1
10 TABLET ORAL DAILY
Qty: 5 TABLET | Refills: 0 | Status: SHIPPED | OUTPATIENT
Start: 2023-11-08 | End: 2023-11-13

## 2023-10-29 RX ORDER — IPRATROPIUM BROMIDE AND ALBUTEROL SULFATE 2.5; .5 MG/3ML; MG/3ML
3 SOLUTION RESPIRATORY (INHALATION) 4 TIMES DAILY PRN
Qty: 180 ML | Refills: 11 | Status: SHIPPED | OUTPATIENT
Start: 2023-10-29 | End: 2024-01-22 | Stop reason: ALTCHOICE

## 2023-10-29 RX ORDER — POTASSIUM CHLORIDE 20 MEQ/1
20 TABLET, EXTENDED RELEASE ORAL 2 TIMES DAILY
Qty: 30 TABLET | Refills: 0 | Status: SHIPPED | OUTPATIENT
Start: 2023-10-29 | End: 2024-01-22 | Stop reason: ALTCHOICE

## 2023-10-29 RX ORDER — AZITHROMYCIN 500 MG/1
500 TABLET, FILM COATED ORAL DAILY
Qty: 5 TABLET | Refills: 0 | Status: SHIPPED | OUTPATIENT
Start: 2023-10-29 | End: 2024-01-22 | Stop reason: ALTCHOICE

## 2023-10-29 RX ORDER — DIPHENHYDRAMINE HCL 25 MG
25 TABLET ORAL DAILY
Qty: 1 TABLET | Refills: 0 | Status: SHIPPED | OUTPATIENT
Start: 2023-10-30

## 2023-10-29 RX ORDER — IPRATROPIUM BROMIDE AND ALBUTEROL SULFATE 2.5; .5 MG/3ML; MG/3ML
3 SOLUTION RESPIRATORY (INHALATION) 3 TIMES DAILY PRN
Qty: 180 ML | Refills: 11 | Status: SHIPPED | OUTPATIENT
Start: 2023-10-29

## 2023-10-29 RX ORDER — ACETAMINOPHEN 325 MG/1
650 TABLET ORAL EVERY 6 HOURS PRN
Qty: 30 TABLET | Refills: 0 | Status: SHIPPED | OUTPATIENT
Start: 2023-10-29 | End: 2024-04-17 | Stop reason: WASHOUT

## 2023-10-29 RX ORDER — EPINEPHRINE 0.3 MG/.3ML
0.3 INJECTION SUBCUTANEOUS AS NEEDED
Qty: 1 ML | Refills: 0 | Status: SHIPPED | OUTPATIENT
Start: 2023-10-29

## 2023-10-29 RX ORDER — ERGOCALCIFEROL 1.25 MG/1
1250 CAPSULE ORAL
Qty: 1 CAPSULE | Refills: 0 | Status: SHIPPED | OUTPATIENT
Start: 2023-11-02

## 2023-10-29 RX ADMIN — MONTELUKAST 10 MG: 10 TABLET, FILM COATED ORAL at 20:58

## 2023-10-29 RX ADMIN — PREDNISONE 20 MG: 20 TABLET ORAL at 10:29

## 2023-10-29 RX ADMIN — GABAPENTIN 300 MG: 300 CAPSULE ORAL at 10:29

## 2023-10-29 RX ADMIN — METOPROLOL TARTRATE 25 MG: 25 TABLET, FILM COATED ORAL at 10:30

## 2023-10-29 RX ADMIN — GABAPENTIN 300 MG: 300 CAPSULE ORAL at 20:58

## 2023-10-29 RX ADMIN — FUROSEMIDE 40 MG: 40 TABLET ORAL at 10:29

## 2023-10-29 RX ADMIN — PANTOPRAZOLE SODIUM 40 MG: 40 TABLET, DELAYED RELEASE ORAL at 10:29

## 2023-10-29 RX ADMIN — POTASSIUM CHLORIDE 20 MEQ: 1500 TABLET, EXTENDED RELEASE ORAL at 10:30

## 2023-10-29 RX ADMIN — MECLIZINE HYDROCHLORIDE 25 MG: 25 TABLET ORAL at 20:58

## 2023-10-29 RX ADMIN — LEVOTHYROXINE SODIUM 125 MCG: 0.12 TABLET ORAL at 06:14

## 2023-10-29 RX ADMIN — IPRATROPIUM BROMIDE AND ALBUTEROL SULFATE 3 ML: 2.5; .5 SOLUTION RESPIRATORY (INHALATION) at 08:10

## 2023-10-29 RX ADMIN — MECLIZINE HYDROCHLORIDE 25 MG: 25 TABLET ORAL at 15:25

## 2023-10-29 RX ADMIN — POTASSIUM CHLORIDE 20 MEQ: 1500 TABLET, EXTENDED RELEASE ORAL at 20:58

## 2023-10-29 RX ADMIN — METOPROLOL TARTRATE 25 MG: 25 TABLET, FILM COATED ORAL at 20:58

## 2023-10-29 RX ADMIN — LORATADINE 10 MG: 10 TABLET ORAL at 10:29

## 2023-10-29 RX ADMIN — PREDNISONE 20 MG: 20 TABLET ORAL at 20:58

## 2023-10-29 RX ADMIN — AZITHROMYCIN MONOHYDRATE 500 MG: 500 INJECTION, POWDER, LYOPHILIZED, FOR SOLUTION INTRAVENOUS at 10:32

## 2023-10-29 RX ADMIN — DIPHENHYDRAMINE HYDROCHLORIDE 25 MG: 25 TABLET ORAL at 06:14

## 2023-10-29 RX ADMIN — Medication 400 MG: at 10:29

## 2023-10-29 RX ADMIN — MECLIZINE HYDROCHLORIDE 25 MG: 25 TABLET ORAL at 10:29

## 2023-10-29 RX ADMIN — GABAPENTIN 300 MG: 300 CAPSULE ORAL at 15:24

## 2023-10-29 ASSESSMENT — PAIN SCALES - GENERAL
PAINLEVEL_OUTOF10: 0 - NO PAIN
PAINLEVEL_OUTOF10: 0 - NO PAIN

## 2023-10-29 ASSESSMENT — PAIN - FUNCTIONAL ASSESSMENT: PAIN_FUNCTIONAL_ASSESSMENT: 0-10

## 2023-10-29 NOTE — CARE PLAN
Problem: Respiratory  Goal: No signs of respiratory distress (eg. Use of accessory muscles. Peds grunting)  Outcome: Met

## 2023-10-30 NOTE — PROGRESS NOTES
Dixie Wilks is a 73 y.o. female on day 2 of admission presenting with HAYS (dyspnea on exertion).  Better energy level better walking better wants to go home feeling better  Subjective     Worried about meningioma which is probably an of no consequence of present sickness bronchitis improved ready to go home she is important appointment today discharge on request       Objective   Much improved breathing  Physical Exam  HEENT:  Head externally atraumatic, no pallor, no icterus, extraocular movements intact, pulls reacting to light, oral mucosa moist and throat clear.  Neck:  Supple, no JVP, no palpable adenopathy or thyromegaly.  No carotid bruit.  Chest:  Clear to auscultation and resonant.  In much better  Heart:  Regular rate and rhythm, no murmur or gallop could be appreciated.  Abdomen:  Soft, nontender, bowel sounds present, normoactive, no palpable hepatosplenomegaly.  Extremities:  No edema, pulses present, no cyanosis or clubbing.  CNS:  Patient alert, oriented to time, place and person.  Power 5/5 all over and deep tendon reflexes symmetrical, cranial nerves 2-12 grossly intact.  Skin:  No active rash.  Musculoskeletal:  No joint swelling or erythema, range of movement normal.  Last Recorded Vitals  Heart Rate:  [61-72]   Temp:  [36.3 °C (97.3 °F)-36.8 °C (98.2 °F)]   Resp:  [16-18]   BP: (122-145)/(52-86)   Weight:  [111 kg (244 lb 11.4 oz)]   SpO2:  [93 %-95 %]     Intake/Output last 3 Shifts:  I/O last 3 completed shifts:  In: 500 (4.5 mL/kg) [IV Piggyback:500]  Out: - (0 mL/kg)   Weight: 111 kg     Relevant Results  No results found for the last 90 days.    No results found for this or any previous visit (from the past 24 hour(s)).     Current Facility-Administered Medications:     acetaminophen (Tylenol) tablet 650 mg, 650 mg, oral, q6h PRN, Candy Blanco MD, 650 mg at 10/28/23 0957    azithromycin (Zithromax) in dextrose 5 % in water (D5W) 250 mL  mg, 500 mg, intravenous, q24h, Candy Blanco MD,  Stopped at 10/29/23 1132    butalbital-acetaminophen-caff -40 mg per tablet 2 tablet, 2 tablet, oral, Daily PRN, Yarelis Blanco MD, 2 tablet at 10/27/23 1111    diphenhydrAMINE (Sominex) tablet 25 mg, 25 mg, oral, Daily, Yarelis Blanco MD, 25 mg at 10/29/23 0614    EPINEPHrine (Epipen) injection syringe 0.3 mg, 0.3 mg, intramuscular, PRN, Yarelis Blanco MD    [START ON 11/2/2023] ergocalciferol (Vitamin D-2) capsule 1,250 mcg, 1,250 mcg, oral, Weekly, Yarelis Blanco MD    fluticasone furoate-vilanteroL (Breo Ellipta) 100-25 mcg/dose inhaler 1 puff, 1 puff, inhalation, Daily, Yarelis Blanco MD    furosemide (Lasix) tablet 40 mg, 40 mg, oral, Daily, Yarelis Blanco MD, 40 mg at 10/29/23 1029    gabapentin (Neurontin) capsule 300 mg, 300 mg, oral, TID, Yarelis Blanco MD, 300 mg at 10/29/23 1524    guaiFENesin (Robitussin) 100 mg/5 mL syrup 10 mL, 10 mL, oral, q4h PRN, Candy Blanco MD    ipratropium-albuteroL (Duo-Neb) 0.5-2.5 mg/3 mL nebulizer solution 3 mL, 3 mL, nebulization, TID PRN, Reno Martin MD, 3 mL at 10/28/23 2041    ipratropium-albuteroL (Duo-Neb) 0.5-2.5 mg/3 mL nebulizer solution 3 mL, 3 mL, nebulization, 4x daily PRN, Yarelis Blanco MD, 3 mL at 10/29/23 0810    levothyroxine (Synthroid, Levoxyl) tablet 125 mcg, 125 mcg, oral, Daily, Yarelis Blanco MD, 125 mcg at 10/29/23 0614    loratadine (Claritin) tablet 10 mg, 10 mg, oral, Daily, Yarelis Blanco MD, 10 mg at 10/29/23 1029    magnesium oxide (Mag-Ox) tablet 400 mg, 400 mg, oral, Daily, Yarelis Blanco MD, 400 mg at 10/29/23 1029    meclizine (Antivert) tablet 25 mg, 25 mg, oral, TID, aYrelis Blanco MD, 25 mg at 10/29/23 1525    metoprolol tartrate (Lopressor) tablet 25 mg, 25 mg, oral, BID, Yarelis Blanco MD, 25 mg at 10/29/23 1030    montelukast (Singulair) tablet 10 mg, 10 mg, oral, Nightly, Yarelis Blanco MD, 10 mg at 10/28/23 2003    ondansetron ODT (Zofran-ODT) disintegrating tablet 4 mg, 4 mg, oral, q8h PRN,  Yarelis Blanco MD    pantoprazole (ProtoNix) EC tablet 40 mg, 40 mg, oral, Daily, Yarelis Blanco MD, 40 mg at 10/29/23 1029    potassium chloride CR (Klor-Con M20) ER tablet 20 mEq, 20 mEq, oral, BID, Yarelis Blanco MD, 20 mEq at 10/29/23 1030    predniSONE (Deltasone) tablet 20 mg, 20 mg, oral, BID, 20 mg at 10/29/23 1029 **FOLLOWED BY** [START ON 11/3/2023] predniSONE (Deltasone) tablet 20 mg, 20 mg, oral, Daily **FOLLOWED BY** [START ON 11/8/2023] predniSONE (Deltasone) tablet 10 mg, 10 mg, oral, Daily, Oziel Martinez, APRN-CNP   Assessment/Plan   Principal Problem:    HAYS (dyspnea on exertion)  Active Problems:    Angioedema    Extrinsic asthma without complication    Function test done at [facility] who is not available I informed her    Brain meningioma old I doubt this is a chronic is any consequence but I will do the MRI as an outpatient    Plan discharge home with a request to on oral steroid and Z-Justo separate discharge summary dictated           Yarelis Blanco MD

## 2023-10-30 NOTE — DISCHARGE SUMMARY
Discharge Diagnosis  HAYS (dyspnea on exertion)  Bronchitis restrictive lung disease obesity  Issues Requiring Follow-Up  Lung function test follow-up on meningioma    Discharge Meds     Your medication list        START taking these medications        Instructions Last Dose Given Next Dose Due   acetaminophen 325 mg tablet  Commonly known as: Tylenol      Take 2 tablets (650 mg) by mouth every 6 hours if needed for mild pain (1 - 3).       guaiFENesin 100 mg/5 mL syrup  Commonly known as: Robitussin      Take 10 mL by mouth every 4 hours if needed for cough.       magnesium oxide 400 mg (241.3 mg magnesium) tablet  Commonly known as: Mag-Ox      Take 1 tablet (400 mg) by mouth once daily.       predniSONE 20 mg tablet  Commonly known as: Deltasone      Take 1 tablet (20 mg) by mouth 2 times a day for 8 doses.       predniSONE 20 mg tablet  Commonly known as: Deltasone  Start taking on: November 3, 2023      Take 1 tablet (20 mg) by mouth once daily for 5 doses. Do not start before November 3, 2023.       predniSONE 10 mg tablet  Commonly known as: Deltasone  Start taking on: November 8, 2023      Take 1 tablet (10 mg) by mouth once daily for 5 doses. Do not start before November 8, 2023.              CHANGE how you take these medications        Instructions Last Dose Given Next Dose Due   diphenhydrAMINE 25 mg tablet  Commonly known as: Benadryl Allergy  Start taking on: October 30, 2023  What changed:   how much to take  when to take this      Take 1 tablet (25 mg) by mouth early in the morning.. Do not start before October 30, 2023.       EPINEPHrine 0.3 mg/0.3 mL injection syringe  Commonly known as: Epipen  What changed:   how much to take  how to take this  when to take this  reasons to take this  additional instructions      Inject 0.3 mL (0.3 mg) into the muscle if needed for anaphylaxis. Inject into upper leg. Call 911 after use.       ergocalciferol 1.25 MG (55488 UT) capsule  Commonly known as: Vitamin  D-2  Start taking on: November 2, 2023  What changed: when to take this      Take 1 capsule (1,250 mcg) by mouth 1 (one) time per week. Do not start before November 2, 2023.       fluticasone furoate-vilanteroL 100-25 mcg/dose inhaler  Commonly known as: Breo Ellipta  What changed: Another medication with the same name was added. Make sure you understand how and when to take each.      Inhale 1 puff once daily. Rinse and gargle with water afterwards       fluticasone furoate-vilanteroL 200-25 mcg/dose inhaler  Commonly known as: Breo Ellipta  What changed: You were already taking a medication with the same name, and this prescription was added. Make sure you understand how and when to take each.  Replaces: budesonide-formoteroL 80-4.5 mcg/actuation inhaler      Inhale 1 puff once daily.       ipratropium-albuteroL 0.5-2.5 mg/3 mL nebulizer solution  Commonly known as: Duo-Neb  What changed: You were already taking a medication with the same name, and this prescription was added. Make sure you understand how and when to take each.      Take 3 mL by nebulization 3 times a day as needed for wheezing.       ipratropium-albuteroL 0.5-2.5 mg/3 mL nebulizer solution  Commonly known as: Duo-Neb  What changed: Another medication with the same name was added. Make sure you understand how and when to take each.      Take 3 mL by nebulization 4 times a day as needed for wheezing or shortness of breath.              CONTINUE taking these medications        Instructions Last Dose Given Next Dose Due   albuterol 90 mcg/actuation inhaler      Inhale 2 puffs 3 times a day.       azithromycin 500 mg tablet  Commonly known as: Zithromax      Take 1 tablet (500 mg) by mouth once daily.       butalbital-acetaminophen-caff -40 mg tablet      Take 2 tablets by mouth once daily as needed for headaches or migraine.       fexofenadine 180 mg tablet  Commonly known as: Allegra Allergy      Take 1 tablet (180 mg) by mouth once daily.        furosemide 40 mg tablet  Commonly known as: Lasix      Take 1 tablet (40 mg) by mouth once daily.       gabapentin 300 mg capsule  Commonly known as: Neurontin      Take 1 capsule (300 mg) by mouth in the morning and 1 capsule (300 mg) in the evening and 1 capsule (300 mg) before bedtime.       levothyroxine 125 mcg tablet  Commonly known as: Synthroid, Levoxyl      Take 1 tablet (125 mcg) by mouth once daily.       meclizine 25 mg tablet  Commonly known as: Antivert           metoprolol tartrate 25 mg tablet  Commonly known as: Lopressor      TAKE 1 TABLET BY MOUTH TWICE  DAILY       montelukast 10 mg tablet  Commonly known as: Singulair      Take 1 tablet (10 mg) by mouth once daily at bedtime.       nebulizer accessories misc      Use four times daily       nebulizer and compressor device      Use as directed       ondansetron ODT 4 mg disintegrating tablet  Commonly known as: Zofran-ODT      Take 1 tablet (4 mg) by mouth every 8 hours if needed for nausea or vomiting.       pantoprazole 40 mg EC tablet  Commonly known as: ProtoNix      Take 1 tablet (40 mg) by mouth once daily.       potassium chloride CR 20 mEq ER tablet  Commonly known as: Klor-Con M20      Take 1 tablet (20 mEq) by mouth 2 times a day. Do not crush or chew.              STOP taking these medications      budesonide-formoteroL 80-4.5 mcg/actuation inhaler  Commonly known as: Symbicort  Replaced by: fluticasone furoate-vilanteroL 200-25 mcg/dose inhaler        loratadine 10 mg tablet  Commonly known as: Claritin                  Where to Get Your Medications        These medications were sent to [id number] - [place], [place] - [address] AT [facility] & [address]  [address] [facility] [address]      Phone: [phone]   acetaminophen 325 mg tablet  azithromycin 500 mg tablet  diphenhydrAMINE 25 mg tablet  EPINEPHrine 0.3 mg/0.3 mL injection syringe  ergocalciferol 1.25 MG (79167 UT) capsule  fluticasone  furoate-vilanteroL 200-25 mcg/dose inhaler  guaiFENesin 100 mg/5 mL syrup  ipratropium-albuteroL 0.5-2.5 mg/3 mL nebulizer solution  ipratropium-albuteroL 0.5-2.5 mg/3 mL nebulizer solution  potassium chloride CR 20 mEq ER tablet  predniSONE 10 mg tablet  predniSONE 20 mg tablet  predniSONE 20 mg tablet         Test Results Pending At Discharge  Pending Labs       No current pending labs.            Hospital Course   Patient came with shortness of breathing bronchitis seen by pulmonary team responded to treatment discharged in a stable condition and will follow pulmonary and outpatient follow-up with me in 5 days    Pertinent Physical Exam At Time of Discharge  Physical Exam    Outpatient Follow-Up  Future Appointments   Date Time Provider Department Center   1/22/2024  2:15 PM Felix Gibson MD [id number] [facility]   3/27/2024 11:45 AM Inge Humphries MD [id number] [place]   See me 5 days      Yarelis Blanco MD

## 2023-11-06 ENCOUNTER — OFFICE VISIT (OUTPATIENT)
Dept: PRIMARY CARE | Facility: CLINIC | Age: 73
End: 2023-11-06
Payer: MEDICARE

## 2023-11-06 VITALS — DIASTOLIC BLOOD PRESSURE: 74 MMHG | BODY MASS INDEX: 40.72 KG/M2 | SYSTOLIC BLOOD PRESSURE: 134 MMHG | HEIGHT: 65 IN

## 2023-11-06 DIAGNOSIS — R06.02 SHORTNESS OF BREATH: Primary | ICD-10-CM

## 2023-11-06 DIAGNOSIS — R09.02 HYPOXIA: ICD-10-CM

## 2023-11-06 DIAGNOSIS — I10 HYPERTENSION, UNSPECIFIED TYPE: ICD-10-CM

## 2023-11-06 DIAGNOSIS — R60.9 EDEMA, UNSPECIFIED TYPE: ICD-10-CM

## 2023-11-06 DIAGNOSIS — T78.3XXA ANGIOEDEMA, INITIAL ENCOUNTER: ICD-10-CM

## 2023-11-06 DIAGNOSIS — E78.00 HIGH CHOLESTEROL: ICD-10-CM

## 2023-11-06 DIAGNOSIS — E66.3 OVERWEIGHT: ICD-10-CM

## 2023-11-06 PROCEDURE — 3075F SYST BP GE 130 - 139MM HG: CPT | Performed by: INTERNAL MEDICINE

## 2023-11-06 PROCEDURE — 3008F BODY MASS INDEX DOCD: CPT | Performed by: INTERNAL MEDICINE

## 2023-11-06 PROCEDURE — 1111F DSCHRG MED/CURRENT MED MERGE: CPT | Performed by: INTERNAL MEDICINE

## 2023-11-06 PROCEDURE — 99213 OFFICE O/P EST LOW 20 MIN: CPT | Performed by: INTERNAL MEDICINE

## 2023-11-06 PROCEDURE — 1036F TOBACCO NON-USER: CPT | Performed by: INTERNAL MEDICINE

## 2023-11-06 PROCEDURE — 1125F AMNT PAIN NOTED PAIN PRSNT: CPT | Performed by: INTERNAL MEDICINE

## 2023-11-06 PROCEDURE — 1159F MED LIST DOCD IN RCRD: CPT | Performed by: INTERNAL MEDICINE

## 2023-11-06 PROCEDURE — 3078F DIAST BP <80 MM HG: CPT | Performed by: INTERNAL MEDICINE

## 2023-11-07 NOTE — PROGRESS NOTES
"Subjective   Patient ID: Dixie Wilks is a 73 y.o. female who presents for Follow-up (Various conditions) and Multiple medical issues.    Ms. Dixie Wilks today came here for multiple medical issues.  She was in a hospital.  She had a lung function test done.  She has seen new allergist, who started her on treatment.  She is off the steroid now.  She came here for follow-up on various conditions.  Weak, tired, fatigued, and exhausted.  Not feeling better.    I have personally reviewed the patient's Past Medical History, Medications, Allergies, Social History, and Family History in the EMR.    Review of Systems   All other systems reviewed and are negative.    Objective   /74   Ht 1.651 m (5' 5\")   BMI 40.72 kg/m²     Physical Exam  Vitals reviewed.   Cardiovascular:      Heart sounds: Normal heart sounds, S1 normal and S2 normal. No murmur heard.     No friction rub.   Pulmonary:      Effort: Pulmonary effort is normal.      Breath sounds: Wheezing (bilateral) present.   Abdominal:      Palpations: There is no hepatomegaly, splenomegaly or mass.   Musculoskeletal:      Right lower leg: No edema.      Left lower leg: No edema.   Lymphadenopathy:      Lower Body: No right inguinal adenopathy. No left inguinal adenopathy.   Neurological:      Cranial Nerves: Cranial nerves 2-12 are intact.      Sensory: No sensory deficit.      Motor: Motor function is intact.      Deep Tendon Reflexes: Reflexes are normal and symmetric.     LAB WORK: Laboratory testing discussed.    Assessment/Plan   Problem List Items Addressed This Visit             ICD-10-CM       Allergies and Adverse Reactions    Angioedema T78.3XXA       Cardiac and Vasculature    Hypertension I10       Pulmonary and Pneumonias    Dyspnea - Primary R06.00       Symptoms and Signs    Edema R60.9     Other Visit Diagnoses         Codes    Hypoxia     R09.02    High cholesterol     E78.00    Overweight     E66.3        1. Shortness of breath, combined lung " disease, ______.  She will benefit from home nebulizer.  Lung function reviewed.  2. Hypoxia, borderline.  We should have a home oxygen at night time, tested, she will need it.  We will do our best to get her home oxygen.  I think she will need it.  3. Edema, on Lasix and potassium.  4. Angioedema.  ______ stable.  5. Hypertension, okay.  6. High cholesterol, stable.  7. The patient does not have a heart failure.  8. Overweight.  Diet and exercise.  Monitor.  9. I shall see her back in about three to four weeks.    Scribe Attestation  By signing my name below, I, Agusto Gomes attest that this documentation has been prepared under the direction and in the presence of Yarelis Blanco MD.

## 2023-11-17 ENCOUNTER — HOSPITAL ENCOUNTER (OUTPATIENT)
Dept: CARDIOLOGY | Facility: HOSPITAL | Age: 73
Discharge: HOME | End: 2023-11-17
Payer: MEDICARE

## 2023-11-17 LAB
ATRIAL RATE: 79 BPM
P AXIS: 5 DEGREES
P OFFSET: 206 MS
P ONSET: 139 MS
PR INTERVAL: 176 MS
Q ONSET: 227 MS
QRS COUNT: 13 BEATS
QRS DURATION: 108 MS
QT INTERVAL: 390 MS
QTC CALCULATION(BAZETT): 447 MS
QTC FREDERICIA: 427 MS
R AXIS: -34 DEGREES
T AXIS: 42 DEGREES
T OFFSET: 422 MS
VENTRICULAR RATE: 79 BPM

## 2023-11-17 PROCEDURE — 93005 ELECTROCARDIOGRAM TRACING: CPT

## 2023-12-05 DIAGNOSIS — G43.709 CHRONIC MIGRAINE WITHOUT AURA WITHOUT STATUS MIGRAINOSUS, NOT INTRACTABLE: Primary | ICD-10-CM

## 2023-12-05 RX ORDER — ONDANSETRON 4 MG/1
4 TABLET, FILM COATED ORAL EVERY 8 HOURS PRN
COMMUNITY
End: 2023-12-05 | Stop reason: ENTERED-IN-ERROR

## 2023-12-05 RX ORDER — ONDANSETRON 4 MG/1
4 TABLET, ORALLY DISINTEGRATING ORAL EVERY 8 HOURS PRN
Qty: 20 TABLET | Refills: 11 | Status: SHIPPED | OUTPATIENT
Start: 2023-12-05 | End: 2024-01-05 | Stop reason: SDUPTHER

## 2023-12-05 RX ORDER — ONDANSETRON 4 MG/1
4 TABLET, ORALLY DISINTEGRATING ORAL EVERY 8 HOURS PRN
COMMUNITY
End: 2023-12-05 | Stop reason: SDUPTHER

## 2023-12-12 ENCOUNTER — OFFICE VISIT (OUTPATIENT)
Dept: RHEUMATOLOGY | Facility: CLINIC | Age: 73
End: 2023-12-12
Payer: MEDICARE

## 2023-12-12 VITALS
SYSTOLIC BLOOD PRESSURE: 140 MMHG | WEIGHT: 247 LBS | HEIGHT: 63 IN | DIASTOLIC BLOOD PRESSURE: 80 MMHG | BODY MASS INDEX: 43.77 KG/M2 | HEART RATE: 57 BPM | OXYGEN SATURATION: 98 %

## 2023-12-12 DIAGNOSIS — M79.7 FIBROMYALGIA SYNDROME: ICD-10-CM

## 2023-12-12 DIAGNOSIS — I87.2 EDEMA OF BOTH LOWER LEGS DUE TO PERIPHERAL VENOUS INSUFFICIENCY: Primary | ICD-10-CM

## 2023-12-12 DIAGNOSIS — M35.00 SJOGREN'S SYNDROME, WITH UNSPECIFIED ORGAN INVOLVEMENT (MULTI): ICD-10-CM

## 2023-12-12 DIAGNOSIS — R60.0 EDEMA OF BOTH LOWER LEGS DUE TO PERIPHERAL VENOUS INSUFFICIENCY: Primary | ICD-10-CM

## 2023-12-12 PROCEDURE — 1125F AMNT PAIN NOTED PAIN PRSNT: CPT | Performed by: INTERNAL MEDICINE

## 2023-12-12 PROCEDURE — 1036F TOBACCO NON-USER: CPT | Performed by: INTERNAL MEDICINE

## 2023-12-12 PROCEDURE — 99215 OFFICE O/P EST HI 40 MIN: CPT | Performed by: INTERNAL MEDICINE

## 2023-12-12 PROCEDURE — 3077F SYST BP >= 140 MM HG: CPT | Performed by: INTERNAL MEDICINE

## 2023-12-12 PROCEDURE — 3008F BODY MASS INDEX DOCD: CPT | Performed by: INTERNAL MEDICINE

## 2023-12-12 PROCEDURE — 1159F MED LIST DOCD IN RCRD: CPT | Performed by: INTERNAL MEDICINE

## 2023-12-12 PROCEDURE — 3079F DIAST BP 80-89 MM HG: CPT | Performed by: INTERNAL MEDICINE

## 2023-12-12 RX ORDER — OMALIZUMAB 202.5 MG/1.4ML
INJECTION, SOLUTION SUBCUTANEOUS
COMMUNITY

## 2023-12-12 ASSESSMENT — ENCOUNTER SYMPTOMS
NEUROLOGICAL NEGATIVE: 1
DEPRESSION: 0
WHEEZING: 1
NERVOUS/ANXIOUS: 1
OCCASIONAL FEELINGS OF UNSTEADINESS: 0
FATIGUE: 1
LOSS OF SENSATION IN FEET: 0
EYES NEGATIVE: 1
SLEEP DISTURBANCE: 1
MUSCULOSKELETAL NEGATIVE: 1
FACIAL SWELLING: 0
SHORTNESS OF BREATH: 1
HEMATOLOGIC/LYMPHATIC NEGATIVE: 1
GASTROINTESTINAL NEGATIVE: 1

## 2023-12-12 ASSESSMENT — ROUTINE ASSESSMENT OF PATIENT INDEX DATA (RAPID3)
PICK_CLOTHES_OFF_FLOOR: WITHOUT ANY DIFFICULTY
FEELINGS_DEPRESSION: WITHOUT ANY DIFFICULTY
PARTIPATE_RECREATIONAL_ACTIVITIES: WITH SOME DIFFICULTY
IN_OUT_BED: WITHOUT ANY DIFFICULTY
WALK_FLAT_GROUND: WITHOUT ANY DIFFICULTY
WALK_KILOMETERS: WITH SOME DIFFICULTY
FEELINGS_ANXIETY_NERVOUS: WITHOUT ANY DIFFICULTY
ON A SCALE OF ONE TO TEN, HOW MUCH PAIN HAVE YOU HAD BECAUSE OF YOUR CONDITION OVER THE PAST WEEK?: 8.5
LIFT_CUP_TO_MOUTH: WITHOUT ANY DIFFICULTY
ON A SCALE OF ONE TO TEN, HOW MUCH PAIN HAVE YOU HAD BECAUSE OF YOUR CONDITION OVER THE PAST WEEK?: 8.5
WEIGHTED_TOTAL_SCORE: 4.4
WASH_DRY_BODY: WITHOUT ANY DIFFICULTY
ON A SCALE OF ONE TO TEN, CONSIDERING ALL THE WAYS IN WHICH ILLNESS AND HEALTH CONDITIONS MAY AFFECT YOU AT THIS TIME, PLEASE INDICATE BELOW HOW YOU ARE DOING:: 4
IN_OUT_TRANSPORT: WITHOUT ANY DIFFICULTY
ON A SCALE OF ONE TO TEN, CONSIDERING ALL THE WAYS IN WHICH ILLNESS AND HEALTH CONDITIONS MAY AFFECT YOU AT THIS TIME, PLEASE INDICATE BELOW HOW YOU ARE DOING:: 4
TURN_FAUCETS_OFF: WITHOUT ANY DIFFICULTY
FN_SCORE: 0.7
SUM OF QUESTIONS A TO J: 2
SEVERITY_SCORE: 0
TOTAL RAPID3 SCORE: 13.2
GOOD_NIGHTS_SLEEP: WITH SOME DIFFICULTY
DRESS_YOURSELF: WITHOUT ANY DIFFICULTY

## 2023-12-12 ASSESSMENT — PATIENT HEALTH QUESTIONNAIRE - PHQ9
1. LITTLE INTEREST OR PLEASURE IN DOING THINGS: NOT AT ALL
SUM OF ALL RESPONSES TO PHQ9 QUESTIONS 1 AND 2: 0
2. FEELING DOWN, DEPRESSED OR HOPELESS: NOT AT ALL

## 2023-12-12 ASSESSMENT — PAIN SCALES - GENERAL: PAINLEVEL: 7

## 2023-12-12 ASSESSMENT — JOINT PAIN
TOTAL NUMBER OF TENDER JOINTS: 0
TOTAL NUMBER OF SWOLLEN JOINTS: 0

## 2023-12-12 NOTE — PROGRESS NOTES
"Chief Complaint:  This 72 yo female presents with the chief complaint of fibromyalgia syndrome (FMS) and Sjogren syndrome (SS).    Subjective   HPI  Prob. #1: Bilateral leg swelling      The patient has had two hospitalizations: the first hospitalization was at [facility] in 9/2023 for dyspnea. CHF was not the cause of dyspnea or peripheral lower leg swelling. The patient's dyspnea was attributed to \"reactive asthma\" and she was treated with corticosteroids with benefit and the dyspnea resolved. The second hospitalization was due to dyspnea attributed again to \"reactive asthma\" treated again with corticosteroids with benefit. The leg swelling was again identified, and she was treated with diuretics without benefit.        The patient had a remote history of a DVT about 8 yr ago. She was later treated by Dr. Isaias Kaba for varicose veins with \"ablation therapy\" with benefit. Last week, the patient was again seen by Dr. Kaba and the patient was informed that the leg swelling is not the etiology of the leg swelling. The patient was not informed of a diagnosis and cause of the leg swelling is. However, the patient was treated for leg swelling with FUROSEMIDE both in the hospital and as an outpatient, apparently at a dosage of 20 mg every day or twice daily and without efficacy by Rosina Blanco and Arthur.        The patient states she was told to have her \"autoimmune status\" checked as an etiology of the leg swelling. To her knowledge, the patient has had no imaging studies of the abdomen, pelvis or legs.       Review of Systems   Constitutional:  Positive for fatigue.   HENT: Negative.  Negative for facial swelling.    Eyes: Negative.    Respiratory:  Positive for shortness of breath and wheezing.    Cardiovascular:  Positive for leg swelling.   Gastrointestinal: Negative.    Genitourinary: Negative.    Musculoskeletal: Negative.    Skin: Negative.    Neurological: Negative.    Hematological: Negative.  " "  Psychiatric/Behavioral:  Positive for sleep disturbance. The patient is nervous/anxious.      /80 (BP Location: Right arm, Patient Position: Sitting)   Pulse 57   Ht 1.6 m (5' 3\")   Wt 112 kg (247 lb)   SpO2 98%   BMI 43.75 kg/m²      Objective   Physical Exam  Vitals and nursing note reviewed.   Constitutional:       General: She is not in acute distress.     Appearance: She is obese. She is not ill-appearing.   HENT:      Head: Normocephalic.   Eyes:      Extraocular Movements: Extraocular movements intact.      Conjunctiva/sclera: Conjunctivae normal.      Pupils: Pupils are equal, round, and reactive to light.   Cardiovascular:      Rate and Rhythm: Normal rate and regular rhythm.      Comments: Pulses trace-1+ bilaterally; no murmur, gallop, rub or clicks. Extremities are warm, and show no evidence of peripheral ulcers, cutaneous vasculitis, or fixed Raynaud's.  Bilateral peripheral edema from knees distally, including feet. No peripheral edema above the knees.   Pulmonary:      Effort: Pulmonary effort is normal.      Breath sounds: No wheezing, rhonchi or rales.   Abdominal:      General: There is no distension.      Palpations: There is no mass.      Tenderness: There is no abdominal tenderness.      Comments: Obese   Musculoskeletal:      Comments: Locomotor exam: muscles: tender to palpation consistent with fibromyalgic pain; enthesopathic pain bilaterally of multiple sites; >10 fibromyalgic tender points; no joint synovitis identified.    Neurological:      Mental Status: She is alert.      Comments: Deferred        No synovitis; old OA-associated changes of hands    Assessment/Plan   History of fibromyalgia syndrome -M79.7  History of Sjogren syndrome - M35.01  Obesity -E66.09  Plan:   The patient's peripheral leg swelling appears to be classical deep small vein disease. In the absence of etiologies that cause peripheral edema, one must consider a more proximal venous obstruction in the vena " cava. This is less likely, however, since the proximal legs are not edematous and swollen, as would be anticipated. Notwithstanding, I would recommend referral to Dr. Vega, vascular surgery, for his opinion and consideration of imaging of the abdomen and pelvis. Second, I recommend patient consider having custom compression hose made for treatment of the edema. She will consider this recommendation.       Misbah Jules MD

## 2023-12-13 ENCOUNTER — APPOINTMENT (OUTPATIENT)
Dept: PULMONOLOGY | Facility: CLINIC | Age: 73
End: 2023-12-13
Payer: MEDICARE

## 2023-12-15 DIAGNOSIS — G43.009 MIGRAINE WITHOUT AURA AND WITHOUT STATUS MIGRAINOSUS, NOT INTRACTABLE: ICD-10-CM

## 2023-12-16 RX ORDER — BUTALBITAL, ACETAMINOPHEN AND CAFFEINE 50; 325; 40 MG/1; MG/1; MG/1
2 TABLET ORAL DAILY PRN
Qty: 20 TABLET | Refills: 0 | Status: SHIPPED | OUTPATIENT
Start: 2023-12-16 | End: 2024-02-01 | Stop reason: SDUPTHER

## 2024-01-04 DIAGNOSIS — G43.709 CHRONIC MIGRAINE WITHOUT AURA WITHOUT STATUS MIGRAINOSUS, NOT INTRACTABLE: ICD-10-CM

## 2024-01-04 RX ORDER — ONDANSETRON 4 MG/1
4 TABLET, ORALLY DISINTEGRATING ORAL EVERY 8 HOURS PRN
Qty: 60 TABLET | Refills: 3 | Status: CANCELLED | OUTPATIENT
Start: 2024-01-04

## 2024-01-05 DIAGNOSIS — G43.709 CHRONIC MIGRAINE WITHOUT AURA WITHOUT STATUS MIGRAINOSUS, NOT INTRACTABLE: ICD-10-CM

## 2024-01-05 RX ORDER — ONDANSETRON 4 MG/1
4 TABLET, ORALLY DISINTEGRATING ORAL EVERY 8 HOURS PRN
Qty: 60 TABLET | Refills: 3 | Status: SHIPPED | OUTPATIENT
Start: 2024-01-05 | End: 2024-01-31 | Stop reason: SDUPTHER

## 2024-01-08 ENCOUNTER — APPOINTMENT (OUTPATIENT)
Dept: PRIMARY CARE | Facility: CLINIC | Age: 74
End: 2024-01-08
Payer: MEDICARE

## 2024-01-10 ENCOUNTER — OFFICE VISIT (OUTPATIENT)
Dept: PRIMARY CARE | Facility: CLINIC | Age: 74
End: 2024-01-10
Payer: MEDICARE

## 2024-01-10 VITALS
WEIGHT: 250 LBS | DIASTOLIC BLOOD PRESSURE: 76 MMHG | BODY MASS INDEX: 44.3 KG/M2 | SYSTOLIC BLOOD PRESSURE: 142 MMHG | HEIGHT: 63 IN

## 2024-01-10 DIAGNOSIS — R60.9 FLUID RETENTION: Primary | ICD-10-CM

## 2024-01-10 DIAGNOSIS — E78.5 HYPERLIPIDEMIA, UNSPECIFIED HYPERLIPIDEMIA TYPE: ICD-10-CM

## 2024-01-10 DIAGNOSIS — E03.9 HYPOTHYROIDISM, UNSPECIFIED TYPE: ICD-10-CM

## 2024-01-10 DIAGNOSIS — J40 BRONCHITIS: ICD-10-CM

## 2024-01-10 DIAGNOSIS — I10 HYPERTENSION, UNSPECIFIED TYPE: ICD-10-CM

## 2024-01-10 DIAGNOSIS — E78.00 HIGH CHOLESTEROL: ICD-10-CM

## 2024-01-10 PROCEDURE — 3078F DIAST BP <80 MM HG: CPT | Performed by: INTERNAL MEDICINE

## 2024-01-10 PROCEDURE — 1159F MED LIST DOCD IN RCRD: CPT | Performed by: INTERNAL MEDICINE

## 2024-01-10 PROCEDURE — 1036F TOBACCO NON-USER: CPT | Performed by: INTERNAL MEDICINE

## 2024-01-10 PROCEDURE — 3077F SYST BP >= 140 MM HG: CPT | Performed by: INTERNAL MEDICINE

## 2024-01-10 PROCEDURE — 3008F BODY MASS INDEX DOCD: CPT | Performed by: INTERNAL MEDICINE

## 2024-01-10 PROCEDURE — 99214 OFFICE O/P EST MOD 30 MIN: CPT | Performed by: INTERNAL MEDICINE

## 2024-01-10 PROCEDURE — 1125F AMNT PAIN NOTED PAIN PRSNT: CPT | Performed by: INTERNAL MEDICINE

## 2024-01-10 RX ORDER — DOXYCYCLINE 100 MG/1
100 CAPSULE ORAL 2 TIMES DAILY
Qty: 20 CAPSULE | Refills: 0 | Status: SHIPPED | OUTPATIENT
Start: 2024-01-10 | End: 2024-01-22 | Stop reason: ALTCHOICE

## 2024-01-10 RX ORDER — PREDNISONE 10 MG/1
TABLET ORAL
Qty: 18 TABLET | Refills: 0 | Status: SHIPPED | OUTPATIENT
Start: 2024-01-10 | End: 2024-01-22 | Stop reason: ALTCHOICE

## 2024-01-10 RX ORDER — LORATADINE 10 MG/1
10 TABLET ORAL DAILY
Qty: 30 TABLET | Refills: 2 | Status: SHIPPED | OUTPATIENT
Start: 2024-01-10 | End: 2024-01-22 | Stop reason: ALTCHOICE

## 2024-01-10 ASSESSMENT — ENCOUNTER SYMPTOMS
DEPRESSION: 0
OCCASIONAL FEELINGS OF UNSTEADINESS: 0
LOSS OF SENSATION IN FEET: 0

## 2024-01-11 NOTE — PROGRESS NOTES
"Subjective   Patient ID: Dixie Wilks is a 73 y.o. female who presents for Follow-up (Multiple medical issues).    Ms. Wilks today came here for cough, yellow sputum, sinus congestion, bronchitis, headache going on for last several days.  She started with the flu, congested.  She is undergoing through immune therapy.  She came for follow-up.    I have personally reviewed the patient's Past Medical History, Medications, Allergies, Social History, and Family History in the EMR.    Review of Systems   All other systems reviewed and are negative.    Objective   /76   Ht 1.6 m (5' 3\")   Wt 113 kg (250 lb)   BMI 44.29 kg/m²     Physical Exam  Vitals reviewed.   HENT:      Nose: Congestion present.      Comments: Postnasal drip.     Mouth/Throat:      Comments: Throat congested.  Cardiovascular:      Heart sounds: Normal heart sounds, S1 normal and S2 normal. No murmur heard.     No friction rub.   Pulmonary:      Effort: Pulmonary effort is normal.      Breath sounds: Wheezing present.   Abdominal:      Palpations: There is no hepatomegaly, splenomegaly or mass.   Musculoskeletal:      Right lower leg: No edema.      Left lower leg: No edema.   Lymphadenopathy:      Lower Body: No right inguinal adenopathy. No left inguinal adenopathy.   Neurological:      Cranial Nerves: Cranial nerves 2-12 are intact.      Sensory: No sensory deficit.      Motor: Motor function is intact.      Deep Tendon Reflexes: Reflexes are normal and symmetric.     LAB WORK: Laboratory testing discussed.    Assessment/Plan   Problem List Items Addressed This Visit             ICD-10-CM       Cardiac and Vasculature    Hypertension I10    Relevant Orders    CBC    Urinalysis with Reflex Microscopic       Endocrine/Metabolic    Hypothyroidism E03.9    Relevant Orders    Thyroid Stimulating Hormone     Other Visit Diagnoses         Codes    Fluid retention    -  Primary R60.9    High cholesterol     E78.00    Relevant Orders    Comprehensive " Metabolic Panel    Bronchitis     J40    Relevant Medications    doxycycline (Vibramycin) 100 mg capsule    loratadine (Claritin) 10 mg tablet    predniSONE (Deltasone) 10 mg tablet    Hyperlipidemia, unspecified hyperlipidemia type     E78.5    Relevant Orders    Lipid Panel        1. Acute asthmatic bronchitis.  Doxycycline, Claritin, Robitussin, Flonase.  She is a bit reluctant to take prednisone.  She will take blessing form specialist before she takes.  2. Hypertension, okay.  3. High cholesterol, stable.  4. Fluid retention.  Monitor.  5. Varicose veins.  She saw Dr. Vega.  Things are improving.  6. I shall see her back in a week after blood tests.    Scribe Attestation  By signing my name below, I, Amada Avila, Agusto attest that this documentation has been prepared under the direction and in the presence of Yarelis Blanco MD.

## 2024-01-16 ENCOUNTER — APPOINTMENT (OUTPATIENT)
Dept: RHEUMATOLOGY | Facility: CLINIC | Age: 74
End: 2024-01-16
Payer: MEDICARE

## 2024-01-22 ENCOUNTER — OFFICE VISIT (OUTPATIENT)
Dept: CARDIOLOGY | Facility: CLINIC | Age: 74
End: 2024-01-22
Payer: MEDICARE

## 2024-01-22 VITALS
OXYGEN SATURATION: 95 % | HEIGHT: 66 IN | HEART RATE: 56 BPM | DIASTOLIC BLOOD PRESSURE: 72 MMHG | RESPIRATION RATE: 16 BRPM | SYSTOLIC BLOOD PRESSURE: 148 MMHG | BODY MASS INDEX: 39.7 KG/M2 | WEIGHT: 247 LBS

## 2024-01-22 DIAGNOSIS — R07.9 CHEST PAIN, UNSPECIFIED TYPE: ICD-10-CM

## 2024-01-22 DIAGNOSIS — R06.02 SHORTNESS OF BREATH: ICD-10-CM

## 2024-01-22 DIAGNOSIS — I10 PRIMARY HYPERTENSION: ICD-10-CM

## 2024-01-22 PROCEDURE — 1036F TOBACCO NON-USER: CPT | Performed by: INTERNAL MEDICINE

## 2024-01-22 PROCEDURE — 99214 OFFICE O/P EST MOD 30 MIN: CPT | Performed by: INTERNAL MEDICINE

## 2024-01-22 PROCEDURE — 1160F RVW MEDS BY RX/DR IN RCRD: CPT | Performed by: INTERNAL MEDICINE

## 2024-01-22 PROCEDURE — 3074F SYST BP LT 130 MM HG: CPT | Performed by: INTERNAL MEDICINE

## 2024-01-22 PROCEDURE — 1159F MED LIST DOCD IN RCRD: CPT | Performed by: INTERNAL MEDICINE

## 2024-01-22 PROCEDURE — 3078F DIAST BP <80 MM HG: CPT | Performed by: INTERNAL MEDICINE

## 2024-01-22 PROCEDURE — 1125F AMNT PAIN NOTED PAIN PRSNT: CPT | Performed by: INTERNAL MEDICINE

## 2024-01-22 RX ORDER — AMLODIPINE BESYLATE 5 MG/1
5 TABLET ORAL DAILY
Qty: 90 TABLET | Refills: 3 | Status: SHIPPED | OUTPATIENT
Start: 2024-01-22 | End: 2025-01-21

## 2024-01-22 ASSESSMENT — PATIENT HEALTH QUESTIONNAIRE - PHQ9
2. FEELING DOWN, DEPRESSED OR HOPELESS: NOT AT ALL
SUM OF ALL RESPONSES TO PHQ9 QUESTIONS 1 AND 2: 0
1. LITTLE INTEREST OR PLEASURE IN DOING THINGS: NOT AT ALL

## 2024-01-22 ASSESSMENT — PAIN SCALES - GENERAL: PAINLEVEL: 9

## 2024-01-22 NOTE — PROGRESS NOTES
Subjective   Dixie Graf is a 73 y.o. female.    Chief Complaint:  CANDE GARF is being seen for a six month month follow-up of chest pain, dyspnea, hypertension and a routine medication evaluation.     HPI  This is a 74 y/o female here today for a six month Cardiology follow up visit. Her CT Cardiac Calcium score is 7. She was diagnosed with Fibromyalgia. Reviewed lab work results and current medications. She denies chest pain, sob, heart palpitations, or lower leg edema. An Echocardiogram was done in 2021- see report. Continues to get Migraine headaches about 15 per week.     ROS    Objective   Physical Exam  General: Pleasant, 74 y/o female in no obvious distress.  HEENT: Normal EOMs without thyromegaly or lymphadenopathy.  Lungs: Clear with good air movement no crackles or wheezing.  Carotid: Normal JVP and carotid upstrokes without bruit.  Cardiac: There is a normal S1 and S2 with regular rhythm Has no murmur, rub, or S3.  Abdomen: Non-tender with normal bowel sounds and no bruits.  Extremities: Without lower leg edema. Skin: No acute rash.  Neuro: Intact without focal motor deficit.  Vascular: Normal radial pulses bilaterally. Normal distal pulses bilaterally     Lab Review:   Admission on 10/27/2023, Discharged on 10/29/2023   Component Date Value    Ventricular Rate 11/17/2023 79     Atrial Rate 11/17/2023 79     VT Interval 11/17/2023 176     QRS Duration 11/17/2023 108     QT Interval 11/17/2023 390     QTC Calculation(Bazett) 11/17/2023 447     P Axis 11/17/2023 5     R [place] 11/17/2023 -34     T Axis 11/17/2023 42     QRS Count 11/17/2023 13     Q Onset 11/17/2023 227     P Onset 11/17/2023 139     P Offset 11/17/2023 206     T Offset 11/17/2023 422     QTC [place] 11/17/2023 427     WBC 10/27/2023 14.2 (H)     nRBC 10/27/2023 0.0     RBC 10/27/2023 4.53     Hemoglobin 10/27/2023 13.1     Hematocrit 10/27/2023 40.4     MCV 10/27/2023 89     MCH 10/27/2023 28.9     MCHC 10/27/2023 32.4     RDW  10/27/2023 12.6     Platelets 10/27/2023 397     MPV 10/27/2023 9.5     Neutrophils % 10/27/2023 70.9     Immature Granulocytes %,* 10/27/2023 0.4     Lymphocytes % 10/27/2023 18.4     Monocytes % 10/27/2023 8.0     Eosinophils % 10/27/2023 1.7     Basophils % 10/27/2023 0.6     Neutrophils Absolute 10/27/2023 10.09 (H)     Immature Granulocytes Ab* 10/27/2023 0.05     Lymphocytes Absolute 10/27/2023 2.62     Monocytes Absolute 10/27/2023 1.14 (H)     Eosinophils Absolute 10/27/2023 0.24     Basophils Absolute 10/27/2023 0.09     Glucose 10/27/2023 123 (H)     Sodium 10/27/2023 138     Potassium 10/27/2023 3.6     Chloride 10/27/2023 101     Bicarbonate 10/27/2023 26     Urea Nitrogen 10/27/2023 11     Creatinine 10/27/2023 1.10     eGFR 10/27/2023 53 (L)     Calcium 10/27/2023 9.3     Albumin 10/27/2023 3.9     Alkaline Phosphatase 10/27/2023 63     Total Protein 10/27/2023 7.3     AST 10/27/2023 17     Bilirubin, Total 10/27/2023 0.5     ALT 10/27/2023 14     Anion Gap 10/27/2023 11     Troponin T, High Sensiti* 10/27/2023 26 (H)     WBC 10/28/2023 19.0 (H)     nRBC 10/28/2023 0.0     RBC 10/28/2023 4.16     Hemoglobin 10/28/2023 12.0     Hematocrit 10/28/2023 37.2     MCV 10/28/2023 89     MCH 10/28/2023 28.8     MCHC 10/28/2023 32.3     RDW 10/28/2023 12.7     Platelets 10/28/2023 296     MPV 10/28/2023 11.0     Neutrophils % 10/28/2023 89.6     Immature Granulocytes %,* 10/28/2023 1.4 (H)     Lymphocytes % 10/28/2023 6.4     Monocytes % 10/28/2023 2.3     Eosinophils % 10/28/2023 0.1     Basophils % 10/28/2023 0.2     Neutrophils Absolute 10/28/2023 17.02 (H)     Immature Granulocytes Ab* 10/28/2023 0.27     Lymphocytes Absolute 10/28/2023 1.22     Monocytes Absolute 10/28/2023 0.43     Eosinophils Absolute 10/28/2023 0.01     Basophils Absolute 10/28/2023 0.03     Glucose 10/28/2023 205 (H)     Sodium 10/28/2023 135     Potassium 10/28/2023 4.2     Chloride 10/28/2023 100     Bicarbonate 10/28/2023 24     Urea  Nitrogen 10/28/2023 16     Creatinine 10/28/2023 1.20     eGFR 10/28/2023 48 (L)     Calcium 10/28/2023 9.8     Albumin 10/28/2023 3.5     Alkaline Phosphatase 10/28/2023 59     Total Protein 10/28/2023 7.2     AST 10/28/2023 14     Bilirubin, Total 10/28/2023 0.4     ALT 10/28/2023 12     Anion Gap 10/28/2023 11     IgE 10/28/2023 13    Hospital Outpatient Visit on 10/18/2023   Component Date Value    POCT pH, Arterial 10/18/2023 7.43 (H)     POCT pCO2, Arterial 10/18/2023 40     POCT pO2, Arterial 10/18/2023 61 (L)     POCT SO2, Arterial 10/18/2023 93 (L)     POCT Oxy Hemoglobin, Art* 10/18/2023 91.0 (L)     POCT Base Excess, Arteri* 10/18/2023 2.0     POCT HCO3 Calculated, Ar* 10/18/2023 26.5 (H)     Patient Temperature 10/18/2023 37.0    Admission on 09/27/2023, Discharged on 09/29/2023   Component Date Value    Glucose 09/27/2023 101 (H)     Urea Nitrogen 09/27/2023 12     Creatinine 09/27/2023 1.4     Urea Nitrogen/Creatinine* 09/27/2023 8.6     Sodium 09/27/2023 141     Potassium 09/27/2023 3.6     Chloride 09/27/2023 101     Bicarbonate 09/27/2023 28     Anion Gap 09/27/2023 12     Calcium 09/27/2023 9.3     ESTIMATED GFR 09/27/2023 40     Differential Type 09/27/2023 AUTO DIFF     Immature Granulocyte %, * 09/27/2023 0.30     Neutrophil 09/27/2023 68.00     Lymphocytes % 09/27/2023 20.70     Monocytes % 09/27/2023 7.90     Eosinophil 09/27/2023 2.30     Basophils % 09/27/2023 0.80     Immature Granulocytes Ab* 09/27/2023 0.04     Neutrophils Absolute 09/27/2023 8.04 (H)     Lymphocytes Absolute 09/27/2023 2.45     Monocytes Absolute 09/27/2023 0.94 (H)     Eosinophils Absolute 09/27/2023 0.27     Basophils Absolute 09/27/2023 0.09     WBC 09/27/2023 11.8 (H)     RBC 09/27/2023 4.49     Hemoglobin 09/27/2023 13.5     Hematocrit 09/27/2023 40.5     MCV 09/27/2023 90.2     MCH 09/27/2023 30.1     MCHC 09/27/2023 33.3     RDW-SD 09/27/2023 42.4     RDW-CV 09/27/2023 12.8     Platelets 09/27/2023 399     MPV  09/27/2023 9.1     nRBC 09/27/2023 0     ABSOLUTE NEUTROPHIL CALC* 09/27/2023 8.04     HS Troponin T,Gen 5 09/27/2023 16 (H)     Hs Troponin T Delta 09/27/2023 No previous result     AST 09/27/2023 20     ALT (SGPT) 09/27/2023 21     Alkaline Phosphatase 09/27/2023 67     Total Bilirubin 09/27/2023 0.4     Bilirubin, Direct 09/27/2023 0.2     Bilirubin Indirect 09/27/2023 0.2     Total Protein 09/27/2023 7.4     Albumin 09/27/2023 3.9     Globulin, Total 09/27/2023 3.5     A/G Ratio 09/27/2023 1.1 (L)     Pro-BNP 09/27/2023 401 (H)     HS Troponin T,Gen 5 09/27/2023 15 (H)     Hs Troponin T Delta 09/27/2023 1     Total CK 09/27/2023 47     Anticoagulant 09/27/2023 INFORMATION NOT REPORTED TO LABORATORY     Protime 09/27/2023 10.5     INR 09/27/2023 1.0     aPTT 09/27/2023 25.6     Calcium 09/28/2023 8.9     AST 09/28/2023 16     Alkaline Phosphatase 09/28/2023 56     Total Bilirubin 09/28/2023 0.7     Total Protein 09/28/2023 6.9     Albumin 09/28/2023 3.6     Globulin, Total 09/28/2023 3.3     A/G Ratio 09/28/2023 1.1 (L)     Sodium 09/28/2023 137     Potassium 09/28/2023 3.8     Chloride 09/28/2023 100     Bicarbonate 09/28/2023 27     Anion Gap 09/28/2023 10     Urea Nitrogen 09/28/2023 15     Creatinine 09/28/2023 1.4     Urea Nitrogen/Creatinine* 09/28/2023 10.7     Glucose 09/28/2023 101 (H)     ALT (SGPT) 09/28/2023 18     ESTIMATED GFR 09/28/2023 40     Differential Type 09/28/2023 AUTO DIFF     Immature Granulocyte %, * 09/28/2023 0.30     Neutrophil 09/28/2023 58.80     Lymphocytes % 09/28/2023 28.20     Monocytes % 09/28/2023 8.80 (H)     Eosinophil 09/28/2023 3.10 (H)     Basophils % 09/28/2023 0.80     Immature Granulocytes Ab* 09/28/2023 0.03     Neutrophils Absolute 09/28/2023 6.02     Lymphocytes Absolute 09/28/2023 2.89     Monocytes Absolute 09/28/2023 0.90 (H)     Eosinophils Absolute 09/28/2023 0.32     Basophils Absolute 09/28/2023 0.08     WBC 09/28/2023 10.2     RBC 09/28/2023 4.17      Hemoglobin 09/28/2023 12.3     Hematocrit 09/28/2023 37.6     MCV 09/28/2023 90.2     MCH 09/28/2023 29.5     MCHC 09/28/2023 32.7     RDW-SD 09/28/2023 42.5     RDW-CV 09/28/2023 12.8     Platelets 09/28/2023 367     MPV 09/28/2023 9.2     nRBC 09/28/2023 0     ABSOLUTE NEUTROPHIL CALC* 09/28/2023 6.02     SERUM COLOR 09/28/2023                      Value:YELLOW  Performed at [address] [address]      SERUM CLARITY 09/28/2023                      Value:SLIGHTLY LIPEMIC  Performed at [address] [address]      Is the patient fasting? 09/28/2023                      Value:INFORMATION NOT REPORTED TO LABORATORY  Performed at [address] [address]      Cholesterol 09/28/2023 213 (H)     Triglycerides 09/28/2023 174 (H)     HDL 09/28/2023 45 (L)     LDL Calculated 09/28/2023 133 (H)     Cholesterol/HDL Ratio 09/28/2023 4.7     Glucose 09/29/2023 109 (H)     Urea Nitrogen 09/29/2023 20     Creatinine 09/29/2023 1.6     Urea Nitrogen/Creatinine* 09/29/2023 12.5     Sodium 09/29/2023 138     Potassium 09/29/2023 3.7     Chloride 09/29/2023 99     Bicarbonate 09/29/2023 26     Anion Gap 09/29/2023 13     Calcium 09/29/2023 9.2     ESTIMATED GFR 09/29/2023 34    Lab on 09/25/2023   Component Date Value    Color, Urine 09/25/2023 YELLOW     Appearance, Urine 09/25/2023 HAZY     Specific Gravity, Urine 09/25/2023 1.005     pH, Urine 09/25/2023 6.0     Protein, Urine 09/25/2023 NEGATIVE     Glucose, Urine 09/25/2023 NEGATIVE     Blood, Urine 09/25/2023 NEGATIVE     Ketones, Urine 09/25/2023 NEGATIVE     Bilirubin, Urine 09/25/2023 NEGATIVE     Urobilinogen, Urine 09/25/2023 <2.0     Nitrite, Urine 09/25/2023 NEGATIVE     Leukocyte Esterase, Urine 09/25/2023 LARGE (3+) (A)     Glucose 09/25/2023 93     Sodium 09/25/2023 140     Potassium 09/25/2023 4.3     Chloride 09/25/2023 102     Bicarbonate 09/25/2023 30     Anion Gap 09/25/2023 12     Urea Nitrogen 09/25/2023  13     Creatinine 09/25/2023 1.42 (H)     GFR Female 09/25/2023 39 (A)     Calcium 09/25/2023 9.6     WBC, Urine 09/25/2023 30 (A)     RBC, Urine 09/25/2023 None     Squamous Epithelial Cell* 09/25/2023 13     Transitional Epithelial * 09/25/2023 <1     Bacteria, Urine 09/25/2023 2+ (A)     Budding Yeast, Urine 09/25/2023 PRESENT (A)    Orders Only on 09/13/2023   Component Date Value    C1 Esterase Inhibitor Fu* 09/13/2023 93     C1 Esterase Inhibitor 09/13/2023 32     COMPLEMENT C4 (MG/DL) IN* 09/13/2023 41     WBC 09/13/2023 15.4 (H)     nRBC 09/13/2023 0.0     RBC 09/13/2023 4.46     Hemoglobin 09/13/2023 13.3     Hematocrit 09/13/2023 42.0     MCV 09/13/2023 94     MCHC 09/13/2023 31.7 (L)     Platelets 09/13/2023 418     RDW 09/13/2023 12.9     Neutrophils % 09/13/2023 71.2     Immature Granulocytes %,* 09/13/2023 0.5     Lymphocytes % 09/13/2023 18.2     Monocytes % 09/13/2023 8.9     Eosinophils % 09/13/2023 0.6     Basophils % 09/13/2023 0.6     Neutrophils Absolute 09/13/2023 10.95 (H)     Lymphocytes Absolute 09/13/2023 2.80     Monocytes Absolute 09/13/2023 1.37 (H)     Eosinophils Absolute 09/13/2023 0.09     Basophils Absolute 09/13/2023 0.09    Lab on 08/15/2023   Component Date Value    WBC 08/15/2023 15.4 (H)     nRBC 08/15/2023 0.0     RBC 08/15/2023 4.38     Hemoglobin 08/15/2023 13.0     Hematocrit 08/15/2023 41.5     MCV 08/15/2023 95     MCHC 08/15/2023 31.3 (L)     Platelets 08/15/2023 413     RDW 08/15/2023 12.2     Neutrophils % 08/15/2023 57.3     Immature Granulocytes %,* 08/15/2023 0.4     Lymphocytes % 08/15/2023 31.8     Monocytes % 08/15/2023 7.9     Eosinophils % 08/15/2023 1.8     Basophils % 08/15/2023 0.8     Neutrophils Absolute 08/15/2023 8.84 (H)     Lymphocytes Absolute 08/15/2023 4.90 (H)     Monocytes Absolute 08/15/2023 1.21 (H)     Eosinophils Absolute 08/15/2023 0.27     Basophils Absolute 08/15/2023 0.12 (H)     Glucose 08/15/2023 87     Sodium 08/15/2023 140      Potassium 08/15/2023 4.2     Chloride 08/15/2023 102     Bicarbonate 08/15/2023 29     Anion Gap 08/15/2023 13     Urea Nitrogen 08/15/2023 30 (H)     Creatinine 08/15/2023 1.35 (H)     GFR Female 08/15/2023 42 (A)     Calcium 08/15/2023 9.4     Albumin 08/15/2023 3.8     Alkaline Phosphatase 08/15/2023 55     Total Protein 08/15/2023 7.1     AST 08/15/2023 12     Total Bilirubin 08/15/2023 0.4     ALT (SGPT) 08/15/2023 14     TSH 08/15/2023 1.70     Hemoglobin A1C 08/15/2023 5.5     Estimated Average Glucose 08/15/2023 111     Cholesterol 08/15/2023 210 (H)     HDL 08/15/2023 49.9     Cholesterol/HDL Ratio 08/15/2023 4.2     LDL 08/15/2023 138 (H)     VLDL 08/15/2023 22     Triglycerides 08/15/2023 112        Assessment/Plan   There were no encounter diagnoses.

## 2024-01-31 DIAGNOSIS — G43.709 CHRONIC MIGRAINE WITHOUT AURA WITHOUT STATUS MIGRAINOSUS, NOT INTRACTABLE: ICD-10-CM

## 2024-01-31 DIAGNOSIS — G43.009 MIGRAINE WITHOUT AURA AND WITHOUT STATUS MIGRAINOSUS, NOT INTRACTABLE: ICD-10-CM

## 2024-02-01 RX ORDER — BUTALBITAL, ACETAMINOPHEN AND CAFFEINE 50; 325; 40 MG/1; MG/1; MG/1
2 TABLET ORAL DAILY PRN
Qty: 20 TABLET | Refills: 0 | Status: SHIPPED | OUTPATIENT
Start: 2024-02-01 | End: 2024-03-22 | Stop reason: SDUPTHER

## 2024-02-01 RX ORDER — ONDANSETRON 4 MG/1
4 TABLET, ORALLY DISINTEGRATING ORAL EVERY 8 HOURS PRN
Qty: 270 TABLET | Refills: 3 | Status: SHIPPED | OUTPATIENT
Start: 2024-02-01 | End: 2024-04-17 | Stop reason: SDUPTHER

## 2024-02-13 ENCOUNTER — OFFICE VISIT (OUTPATIENT)
Dept: ORTHOPEDIC SURGERY | Facility: CLINIC | Age: 74
End: 2024-02-13
Payer: MEDICARE

## 2024-02-13 VITALS — HEIGHT: 66 IN | BODY MASS INDEX: 39.7 KG/M2 | WEIGHT: 247 LBS

## 2024-02-13 DIAGNOSIS — M17.12 PRIMARY OSTEOARTHRITIS OF LEFT KNEE: Primary | ICD-10-CM

## 2024-02-13 PROCEDURE — 1159F MED LIST DOCD IN RCRD: CPT

## 2024-02-13 PROCEDURE — 1125F AMNT PAIN NOTED PAIN PRSNT: CPT

## 2024-02-13 PROCEDURE — 1160F RVW MEDS BY RX/DR IN RCRD: CPT

## 2024-02-13 PROCEDURE — 99212 OFFICE O/P EST SF 10 MIN: CPT

## 2024-02-13 PROCEDURE — 1036F TOBACCO NON-USER: CPT

## 2024-02-13 PROCEDURE — 20610 DRAIN/INJ JOINT/BURSA W/O US: CPT

## 2024-02-13 RX ORDER — TRIAMCINOLONE ACETONIDE 40 MG/ML
40 INJECTION, SUSPENSION INTRA-ARTICULAR; INTRAMUSCULAR
Status: COMPLETED | OUTPATIENT
Start: 2024-02-13 | End: 2024-02-13

## 2024-02-13 RX ORDER — LIDOCAINE HYDROCHLORIDE 5 MG/ML
4 INJECTION, SOLUTION INFILTRATION; PERINEURAL
Status: COMPLETED | OUTPATIENT
Start: 2024-02-13 | End: 2024-02-13

## 2024-02-13 RX ADMIN — TRIAMCINOLONE ACETONIDE 40 MG: 40 INJECTION, SUSPENSION INTRA-ARTICULAR; INTRAMUSCULAR at 13:32

## 2024-02-13 RX ADMIN — LIDOCAINE HYDROCHLORIDE 4 ML: 5 INJECTION, SOLUTION INFILTRATION; PERINEURAL at 13:32

## 2024-02-13 NOTE — PROGRESS NOTES
JUANITA Wilks is a 73 y.o. female  in office today for   Chief Complaint   Patient presents with    Left Knee - Pain, Edema   .  she has had increasing pain over the last several weeks.  She last had a CSI with Dr Keen 7/14/23.  She states she had been taking oral steroids for some breathing and asthma issues and once she stopped the steroids she notices the increase in knee pain.  Pain anterior and medial side of knee.  She is also having issues with possible lymphedema in there legs.  She is considering having this knee replaced, but needs to get her asthma and breathing issues under control first.    Past Medical History: history of right total knee, chronic angioedema, bilateral leg edema    Medication  Current Outpatient Medications on File Prior to Visit   Medication Sig Dispense Refill    acetaminophen (Tylenol) 325 mg tablet Take 2 tablets (650 mg) by mouth every 6 hours if needed for mild pain (1 - 3). 30 tablet 0    albuterol 90 mcg/actuation inhaler Inhale 2 puffs 3 times a day. 18 g 3    amLODIPine (Norvasc) 5 mg tablet Take 1 tablet (5 mg) by mouth once daily. 90 tablet 3    butalbital-acetaminophen-caff -40 mg tablet Take 2 tablets by mouth once daily as needed for headaches or migraine. 20 tablet 0    diphenhydrAMINE (Benadryl Allergy) 25 mg tablet Take 1 tablet (25 mg) by mouth early in the morning.. Do not start before October 30, 2023. 1 tablet 0    EPINEPHrine 0.3 mg/0.3 mL injection syringe Inject 0.3 mL (0.3 mg) into the muscle if needed for anaphylaxis. Inject into upper leg. Call 911 after use. 1 mL 0    ergocalciferol (Vitamin D-2) 1.25 MG (73582 UT) capsule Take 1 capsule (1,250 mcg) by mouth 1 (one) time per week. Do not start before November 2, 2023. 1 capsule 0    fexofenadine (Allegra Allergy) 180 mg tablet Take 1 tablet (180 mg) by mouth once daily. (Patient taking differently: Take 1 tablet (180 mg) by mouth once daily. Takes 4 tablets per day.  AM & PM) 30 tablet 3     fluticasone furoate-vilanteroL (Breo Ellipta) 200-25 mcg/dose inhaler Inhale 1 puff once daily. 1 each 0    furosemide (Lasix) 40 mg tablet Take 1 tablet (40 mg) by mouth once daily. (Patient not taking: Reported on 12/12/2023) 30 tablet 5    gabapentin (Neurontin) 300 mg capsule Take 1 capsule (300 mg) by mouth in the morning and 1 capsule (300 mg) in the evening and 1 capsule (300 mg) before bedtime. 90 capsule 0    ipratropium-albuteroL (Duo-Neb) 0.5-2.5 mg/3 mL nebulizer solution Take 3 mL by nebulization 3 times a day as needed for wheezing. 180 mL 11    levothyroxine (Synthroid, Levoxyl) 125 mcg tablet Take 1 tablet (125 mcg) by mouth once daily. 90 tablet 1    magnesium oxide (Mag-Ox) 400 mg (241.3 mg magnesium) tablet Take 1 tablet (400 mg) by mouth once daily. 90 tablet 1    meclizine (Antivert) 25 mg tablet Take 1 tablet (25 mg) by mouth 3 times a day.      metoprolol tartrate (Lopressor) 25 mg tablet TAKE 1 TABLET BY MOUTH TWICE  DAILY 180 tablet 3    montelukast (Singulair) 10 mg tablet Take 1 tablet (10 mg) by mouth once daily at bedtime. 30 tablet 3    omalizumab (Xolair) 150 mg injection Inject under the skin every 28 (twenty-eight) days.      ondansetron ODT (Zofran-ODT) 4 mg disintegrating tablet Take 1 tablet (4 mg) by mouth every 8 hours if needed for nausea or vomiting. 270 tablet 3    pantoprazole (ProtoNix) 40 mg EC tablet Take 1 tablet (40 mg) by mouth once daily. 90 tablet 0     No current facility-administered medications on file prior to visit.       Physical Exam  Constitutional: well developed, well nourished female in no acute distress  Psychiatric: normal mood, appropriate affect  Eyes: sclera anicteric  HENT: normocephalic/atraumatic  CV: regular rate and rhythm   Respiratory: slightly labored breathing  Integumentary: no rash  Neurological: moves all extremities    Left Knee Exam     Tenderness   The patient is experiencing tenderness in the patella and medial joint line.    Range of  "Motion   Extension:  0   Flexion:  120     Other   Erythema: absent  Scars: absent  Left knee swelling: +2 pitting edema lower leg.        Patient ID: Gretta Wilks \"Juvenal" is a 73 y.o. female.    L Inj/Asp: L knee on 2/13/2024 1:32 PM  Indications: pain  Details: 22 G needle, anterolateral approach  Medications: 40 mg triamcinolone acetonide 40 mg/mL; 4 mL lidocaine 5 mg/mL (0.5 %)  Outcome: tolerated well, no immediate complications  Procedure, treatment alternatives, risks and benefits explained, specific risks discussed. Consent was given by the patient. Immediately prior to procedure a time out was called to verify the correct patient, procedure, equipment, support staff and site/side marked as required. Patient was prepped and draped in the usual sterile fashion.         Imaging/Lab:  X-rays were taken 7/14/23 which were reviewed by myself and read by myself and show severe medial and patellofemoral compartment joint space narrwoing with osteophytosis.       Assessment  Assessment: Left knee osteoarthritis    Plan  Plan:  History, physical exam, and imaging were reviewed with patient. Had a discussion with the patient regarding treatment options for arthritis and their relative risks and benefits. We reviewed surgical and nonsurgical option for treatment. Treatments include anti inflammatory medications, physical therapy, weight loss, activity modification, use of assistive devices, injection therapies. We discussed current prescriptions and risks and benefits of continuation of prescription medication as apporpriate. We discussed that arthritis is often progressive over time, an in end stage arthritis surgical interventions can be considered, including arthroplasty.   She would like to repeat the steroid injection at this time.  The left knee was injected per procedure note above.  Follow Up: Patient to follow up as needed if pain persists or gets worse.      All questions were answered for the patient prior " to end of exam and patient addressed their understanding.    Cary Ambriz PA-C  02/13/24

## 2024-02-14 ENCOUNTER — LAB (OUTPATIENT)
Dept: LAB | Facility: LAB | Age: 74
End: 2024-02-14
Payer: MEDICARE

## 2024-02-14 DIAGNOSIS — E03.9 HYPOTHYROIDISM, UNSPECIFIED TYPE: ICD-10-CM

## 2024-02-14 DIAGNOSIS — E78.00 HIGH CHOLESTEROL: ICD-10-CM

## 2024-02-14 DIAGNOSIS — I50.9 EDEMA DUE TO CONGESTIVE HEART FAILURE (MULTI): ICD-10-CM

## 2024-02-14 DIAGNOSIS — I10 HYPERTENSION, UNSPECIFIED TYPE: ICD-10-CM

## 2024-02-14 DIAGNOSIS — E78.5 HYPERLIPIDEMIA, UNSPECIFIED HYPERLIPIDEMIA TYPE: ICD-10-CM

## 2024-02-14 DIAGNOSIS — R60.9 EDEMA, UNSPECIFIED TYPE: ICD-10-CM

## 2024-02-14 LAB
ALBUMIN SERPL BCP-MCNC: 4.1 G/DL (ref 3.4–5)
ALP SERPL-CCNC: 58 U/L (ref 33–136)
ALT SERPL W P-5'-P-CCNC: 24 U/L (ref 7–45)
ANION GAP SERPL CALC-SCNC: 13 MMOL/L (ref 10–20)
APPEARANCE UR: CLEAR
AST SERPL W P-5'-P-CCNC: 23 U/L (ref 9–39)
BACTERIA #/AREA URNS AUTO: ABNORMAL /HPF
BILIRUB SERPL-MCNC: 0.7 MG/DL (ref 0–1.2)
BILIRUB UR STRIP.AUTO-MCNC: NEGATIVE MG/DL
BNP SERPL-MCNC: 102 PG/ML (ref 0–99)
BUN SERPL-MCNC: 17 MG/DL (ref 6–23)
CALCIUM SERPL-MCNC: 10 MG/DL (ref 8.6–10.6)
CAOX CRY #/AREA UR COMP ASSIST: ABNORMAL /HPF
CHLORIDE SERPL-SCNC: 105 MMOL/L (ref 98–107)
CHOLEST SERPL-MCNC: 234 MG/DL (ref 0–199)
CHOLESTEROL/HDL RATIO: 5.5
CO2 SERPL-SCNC: 27 MMOL/L (ref 21–32)
COLOR UR: YELLOW
CREAT SERPL-MCNC: 1.12 MG/DL (ref 0.5–1.05)
EGFRCR SERPLBLD CKD-EPI 2021: 52 ML/MIN/1.73M*2
ERYTHROCYTE [DISTWIDTH] IN BLOOD BY AUTOMATED COUNT: 13.2 % (ref 11.5–14.5)
GLUCOSE SERPL-MCNC: 91 MG/DL (ref 74–99)
GLUCOSE UR STRIP.AUTO-MCNC: NORMAL MG/DL
HCT VFR BLD AUTO: 41.5 % (ref 36–46)
HDLC SERPL-MCNC: 42.2 MG/DL
HGB BLD-MCNC: 13.3 G/DL (ref 12–16)
KETONES UR STRIP.AUTO-MCNC: NEGATIVE MG/DL
LDLC SERPL CALC-MCNC: 160 MG/DL
LEUKOCYTE ESTERASE UR QL STRIP.AUTO: ABNORMAL
MCH RBC QN AUTO: 28.2 PG (ref 26–34)
MCHC RBC AUTO-ENTMCNC: 32 G/DL (ref 32–36)
MCV RBC AUTO: 88 FL (ref 80–100)
MUCOUS THREADS #/AREA URNS AUTO: ABNORMAL /LPF
NITRITE UR QL STRIP.AUTO: NEGATIVE
NON HDL CHOLESTEROL: 192 MG/DL (ref 0–149)
NRBC BLD-RTO: 0 /100 WBCS (ref 0–0)
PH UR STRIP.AUTO: 6 [PH]
PLATELET # BLD AUTO: 412 X10*3/UL (ref 150–450)
POTASSIUM SERPL-SCNC: 4.2 MMOL/L (ref 3.5–5.3)
PROT SERPL-MCNC: 7.2 G/DL (ref 6.4–8.2)
PROT UR STRIP.AUTO-MCNC: ABNORMAL MG/DL
RBC # BLD AUTO: 4.72 X10*6/UL (ref 4–5.2)
RBC # UR STRIP.AUTO: NEGATIVE /UL
RBC #/AREA URNS AUTO: ABNORMAL /HPF
SODIUM SERPL-SCNC: 141 MMOL/L (ref 136–145)
SP GR UR STRIP.AUTO: 1.02
SQUAMOUS #/AREA URNS AUTO: ABNORMAL /HPF
TRIGL SERPL-MCNC: 161 MG/DL (ref 0–149)
TSH SERPL-ACNC: 0.84 MIU/L (ref 0.44–3.98)
UROBILINOGEN UR STRIP.AUTO-MCNC: NORMAL MG/DL
VLDL: 32 MG/DL (ref 0–40)
WBC # BLD AUTO: 10.8 X10*3/UL (ref 4.4–11.3)
WBC #/AREA URNS AUTO: ABNORMAL /HPF

## 2024-02-14 PROCEDURE — 80053 COMPREHEN METABOLIC PANEL: CPT

## 2024-02-14 PROCEDURE — 85027 COMPLETE CBC AUTOMATED: CPT

## 2024-02-14 PROCEDURE — 36415 COLL VENOUS BLD VENIPUNCTURE: CPT

## 2024-02-14 PROCEDURE — 83880 ASSAY OF NATRIURETIC PEPTIDE: CPT

## 2024-02-14 PROCEDURE — 81001 URINALYSIS AUTO W/SCOPE: CPT

## 2024-02-14 PROCEDURE — 84443 ASSAY THYROID STIM HORMONE: CPT

## 2024-02-14 PROCEDURE — 80061 LIPID PANEL: CPT

## 2024-02-15 DIAGNOSIS — R11.0 NAUSEA: Primary | ICD-10-CM

## 2024-02-15 RX ORDER — METOCLOPRAMIDE 10 MG/1
10 TABLET ORAL 2 TIMES DAILY PRN
Qty: 30 TABLET | Refills: 1 | Status: SHIPPED | OUTPATIENT
Start: 2024-02-15 | End: 2024-03-27 | Stop reason: ALTCHOICE

## 2024-02-19 ENCOUNTER — OFFICE VISIT (OUTPATIENT)
Dept: PRIMARY CARE | Facility: CLINIC | Age: 74
End: 2024-02-19
Payer: MEDICARE

## 2024-02-19 VITALS
DIASTOLIC BLOOD PRESSURE: 72 MMHG | WEIGHT: 245.4 LBS | BODY MASS INDEX: 39.44 KG/M2 | SYSTOLIC BLOOD PRESSURE: 136 MMHG | HEIGHT: 66 IN

## 2024-02-19 DIAGNOSIS — E78.5 HYPERLIPIDEMIA, UNSPECIFIED HYPERLIPIDEMIA TYPE: ICD-10-CM

## 2024-02-19 DIAGNOSIS — N39.0 URINARY TRACT INFECTION WITHOUT HEMATURIA, SITE UNSPECIFIED: ICD-10-CM

## 2024-02-19 DIAGNOSIS — R73.03 PRE-DIABETES: ICD-10-CM

## 2024-02-19 DIAGNOSIS — E03.9 HYPOTHYROIDISM, UNSPECIFIED TYPE: ICD-10-CM

## 2024-02-19 DIAGNOSIS — E78.00 HIGH CHOLESTEROL: ICD-10-CM

## 2024-02-19 DIAGNOSIS — M54.9 BACK PAIN, UNSPECIFIED BACK LOCATION, UNSPECIFIED BACK PAIN LATERALITY, UNSPECIFIED CHRONICITY: ICD-10-CM

## 2024-02-19 DIAGNOSIS — R35.0 URINE FREQUENCY: ICD-10-CM

## 2024-02-19 DIAGNOSIS — F41.9 ANXIETY: ICD-10-CM

## 2024-02-19 DIAGNOSIS — R11.0 DAILY NAUSEA: ICD-10-CM

## 2024-02-19 DIAGNOSIS — M54.10 RADICULOPATHY, UNSPECIFIED SPINAL REGION: Primary | ICD-10-CM

## 2024-02-19 DIAGNOSIS — J45.909 ASTHMA, UNSPECIFIED ASTHMA SEVERITY, UNSPECIFIED WHETHER COMPLICATED, UNSPECIFIED WHETHER PERSISTENT (HHS-HCC): ICD-10-CM

## 2024-02-19 DIAGNOSIS — I10 HYPERTENSION, UNSPECIFIED TYPE: ICD-10-CM

## 2024-02-19 PROCEDURE — 3075F SYST BP GE 130 - 139MM HG: CPT | Performed by: INTERNAL MEDICINE

## 2024-02-19 PROCEDURE — 1159F MED LIST DOCD IN RCRD: CPT | Performed by: INTERNAL MEDICINE

## 2024-02-19 PROCEDURE — 99214 OFFICE O/P EST MOD 30 MIN: CPT | Performed by: INTERNAL MEDICINE

## 2024-02-19 PROCEDURE — 1036F TOBACCO NON-USER: CPT | Performed by: INTERNAL MEDICINE

## 2024-02-19 PROCEDURE — 1160F RVW MEDS BY RX/DR IN RCRD: CPT | Performed by: INTERNAL MEDICINE

## 2024-02-19 PROCEDURE — 1125F AMNT PAIN NOTED PAIN PRSNT: CPT | Performed by: INTERNAL MEDICINE

## 2024-02-19 PROCEDURE — 3078F DIAST BP <80 MM HG: CPT | Performed by: INTERNAL MEDICINE

## 2024-02-19 RX ORDER — ONDANSETRON 4 MG/1
4 TABLET, FILM COATED ORAL EVERY 8 HOURS PRN
Qty: 60 TABLET | Refills: 0 | Status: SHIPPED | OUTPATIENT
Start: 2024-02-19 | End: 2024-04-06 | Stop reason: SDUPTHER

## 2024-02-19 RX ORDER — NITROFURANTOIN 25; 75 MG/1; MG/1
100 CAPSULE ORAL 2 TIMES DAILY
Qty: 10 CAPSULE | Refills: 0 | Status: SHIPPED | OUTPATIENT
Start: 2024-02-19 | End: 2024-02-24

## 2024-02-19 ASSESSMENT — ENCOUNTER SYMPTOMS
LOSS OF SENSATION IN FEET: 0
DEPRESSION: 0
OCCASIONAL FEELINGS OF UNSTEADINESS: 0

## 2024-02-19 NOTE — PROGRESS NOTES
"Subjective   Patient ID: Dixie Wilks is a 73 y.o. female who presents for Back Pain and Follow-up (on other conditions and blood work).    1. Ms. Wilks is very unhappy.  She fell down a couple of times.  Very bad back pain, going in her left leg, tingling and numbness like ice shooting for no good reason.  She told me she was very athletic in the past and she was figure skating.  She had her own fair share of back injuries, but never this bad.  Left leg getting weaker, severe pain, tingling and numbness.  2. Increased lower abdominal pain.  She had chills.  UTI symptoms.  3. She is on ______ for asthma, new therapy given.  Some side effects.  4. Follow up on other conditions and blood work.    I have personally reviewed the patient's Past Medical History, Medications, Allergies, Social History, and Family History in the EMR.    Review of Systems   All other systems reviewed and are negative.    Objective   /72   Ht 1.676 m (5' 6\")   Wt 111 kg (245 lb 6.4 oz)   BMI 39.61 kg/m²     Physical Exam  Vitals reviewed.   Cardiovascular:      Heart sounds: Normal heart sounds, S1 normal and S2 normal. No murmur heard.     No friction rub.   Pulmonary:      Effort: Pulmonary effort is normal.      Breath sounds: Normal breath sounds and air entry.   Abdominal:      Palpations: There is no hepatomegaly, splenomegaly or mass.   Musculoskeletal:      Right lower leg: No edema.      Left lower leg: No edema.   Lymphadenopathy:      Lower Body: No right inguinal adenopathy. No left inguinal adenopathy.   Neurological:      Cranial Nerves: Cranial nerves 2-12 are intact.      Sensory: No sensory deficit.      Motor: Motor function is intact.      Deep Tendon Reflexes: Reflexes are normal and symmetric.     LAB WORK: Laboratory testing discussed.    Assessment/Plan   Problem List Items Addressed This Visit             ICD-10-CM       Cardiac and Vasculature    Hypertension I10       Endocrine/Metabolic    Hypothyroidism " E03.9    Relevant Orders    Thyroid Stimulating Hormone       Genitourinary and Reproductive    Urine frequency R35.0    Relevant Medications    nitrofurantoin, macrocrystal-monohydrate, (Macrobid) 100 mg capsule       Mental Health    Anxiety F41.9       Pulmonary and Pneumonias    Asthma J45.909     Other Visit Diagnoses         Codes    Radiculopathy, unspecified spinal region    -  Primary M54.10    High cholesterol     E78.00    Relevant Orders    Comprehensive Metabolic Panel    Hyperlipidemia, unspecified hyperlipidemia type     E78.5    Relevant Orders    Lipid Panel    Daily nausea     R11.0    Relevant Medications    ondansetron (Zofran) 4 mg tablet    Back pain, unspecified back location, unspecified back pain laterality, unspecified chronicity     M54.9    Relevant Orders    XR lumbar spine 2-3 views    Referral to Physical Therapy    Pre-diabetes     R73.03    Relevant Orders    Hemoglobin A1C    Urinary tract infection without hematuria, site unspecified     N39.0        1. Back pain, radiculopathy.  I am worried about spinal fracture.  X-ray ordered.  We need MRI, physical therapy.  2. UTI.  We will try some Macrodantin.  3. Hypertension, okay.  4. High cholesterol, stable.  5. Thyroid, okay.  6. Asthma.  She is under therapy, treatment.  Sees specialist.  7. Follow up with me in a couple of weeks.  If back pain is not better, we will need MRI, but routine blood work in three months.    Scribe Attestation  By signing my name below, ILeidy Scribe attest that this documentation has been prepared under the direction and in the presence of Yarelis Blanco MD.

## 2024-02-21 ENCOUNTER — OFFICE VISIT (OUTPATIENT)
Dept: PRIMARY CARE | Facility: CLINIC | Age: 74
End: 2024-02-21
Payer: MEDICARE

## 2024-02-21 VITALS
HEIGHT: 66 IN | DIASTOLIC BLOOD PRESSURE: 82 MMHG | WEIGHT: 245 LBS | SYSTOLIC BLOOD PRESSURE: 144 MMHG | BODY MASS INDEX: 39.37 KG/M2

## 2024-02-21 DIAGNOSIS — R60.9 EDEMA, UNSPECIFIED TYPE: ICD-10-CM

## 2024-02-21 DIAGNOSIS — M19.90 ARTHRITIS: ICD-10-CM

## 2024-02-21 DIAGNOSIS — I87.2 VENOUS STASIS DERMATITIS, UNSPECIFIED LATERALITY: Primary | ICD-10-CM

## 2024-02-21 PROCEDURE — 1036F TOBACCO NON-USER: CPT | Performed by: INTERNAL MEDICINE

## 2024-02-21 PROCEDURE — 3077F SYST BP >= 140 MM HG: CPT | Performed by: INTERNAL MEDICINE

## 2024-02-21 PROCEDURE — 1159F MED LIST DOCD IN RCRD: CPT | Performed by: INTERNAL MEDICINE

## 2024-02-21 PROCEDURE — 3079F DIAST BP 80-89 MM HG: CPT | Performed by: INTERNAL MEDICINE

## 2024-02-21 PROCEDURE — 1160F RVW MEDS BY RX/DR IN RCRD: CPT | Performed by: INTERNAL MEDICINE

## 2024-02-21 PROCEDURE — 1125F AMNT PAIN NOTED PAIN PRSNT: CPT | Performed by: INTERNAL MEDICINE

## 2024-02-21 PROCEDURE — 99214 OFFICE O/P EST MOD 30 MIN: CPT | Performed by: INTERNAL MEDICINE

## 2024-02-21 RX ORDER — CLINDAMYCIN HYDROCHLORIDE 300 MG/1
300 CAPSULE ORAL 2 TIMES DAILY
Qty: 20 CAPSULE | Refills: 0 | Status: SHIPPED | OUTPATIENT
Start: 2024-02-21 | End: 2024-03-02

## 2024-02-21 RX ORDER — BACITRACIN ZINC 500 UNIT/G
OINTMENT (GRAM) TOPICAL 2 TIMES DAILY
Qty: 14 G | Refills: 0 | Status: SHIPPED | OUTPATIENT
Start: 2024-02-21 | End: 2024-03-27 | Stop reason: ALTCHOICE

## 2024-02-21 RX ORDER — DOXYCYCLINE 100 MG/1
100 CAPSULE ORAL 2 TIMES DAILY
Qty: 20 CAPSULE | Refills: 0 | Status: SHIPPED | OUTPATIENT
Start: 2024-02-21 | End: 2024-03-02

## 2024-02-21 RX ORDER — MELOXICAM 15 MG/1
15 TABLET ORAL DAILY
Qty: 30 TABLET | Refills: 0 | Status: SHIPPED | OUTPATIENT
Start: 2024-02-21 | End: 2024-04-17 | Stop reason: ALTCHOICE

## 2024-02-22 NOTE — PROGRESS NOTES
"Subjective   Patient ID: Dixie Wilks is a 73 y.o. female who presents for multiple medical issues..    Gretta Wilks today came here for multiple medical issues.  Leg swelling and cellulitis, right worse than left, red, inflamed, tender.  She came for follow-up.  She is unhappy we did not call in her prescription the way it was desired.  She came for follow-up on various conditions.    I have personally reviewed the patient's Past Medical History, Medications, Allergies, Social History, and Family History in the EMR.    Review of Systems   All other systems reviewed and are negative.    Objective   /82   Ht 1.676 m (5' 6\")   Wt 111 kg (245 lb)   BMI 39.54 kg/m²     Physical Exam  Vitals reviewed.   Cardiovascular:      Heart sounds: Normal heart sounds, S1 normal and S2 normal. No murmur heard.     No friction rub.   Pulmonary:      Effort: Pulmonary effort is normal.      Breath sounds: Normal breath sounds and air entry.   Abdominal:      Palpations: There is no hepatomegaly, splenomegaly or mass.   Musculoskeletal:      Right lower leg: No edema.      Left lower leg: No edema.   Lymphadenopathy:      Lower Body: No right inguinal adenopathy. No left inguinal adenopathy.   Skin:     Comments: Right leg around shin and ankle red, inflamed, tender, cellulitis, right worse than left.   Neurological:      Cranial Nerves: Cranial nerves 2-12 are intact.      Sensory: No sensory deficit.      Motor: Motor function is intact.      Deep Tendon Reflexes: Reflexes are normal and symmetric.     LAB WORK: Laboratory testing discussed.    Assessment/Plan   Problem List Items Addressed This Visit             ICD-10-CM       Symptoms and Signs    Edema R60.9    Relevant Medications    clindamycin (Cleocin) 300 mg capsule    doxycycline (Vibramycin) 100 mg capsule    bacitracin 500 unit/gram ointment    meloxicam (Mobic) 15 mg tablet     Other Visit Diagnoses         Codes    Venous stasis dermatitis, unspecified " laterality    -  Primary I87.2    Arthritis     M19.90        1. Stasis dermatitis, secondary infection, cellulitis.  Clindamycin, doxycycline, bacitracin.  2. Arthritis.  Meloxicam refill given.  Drink enough water.  Keep hydrated.  3. Follow-up in two weeks or ASAP if needed.    Scribe Attestation  By signing my name below, Leidy ESCOBAR Scribe attest that this documentation has been prepared under the direction and in the presence of Yarelis Blanco MD.

## 2024-03-06 ENCOUNTER — HOSPITAL ENCOUNTER (OUTPATIENT)
Dept: RADIOLOGY | Facility: CLINIC | Age: 74
Discharge: HOME | End: 2024-03-06
Payer: MEDICARE

## 2024-03-06 ENCOUNTER — LAB (OUTPATIENT)
Dept: LAB | Facility: LAB | Age: 74
End: 2024-03-06
Payer: MEDICARE

## 2024-03-06 DIAGNOSIS — R73.03 PRE-DIABETES: ICD-10-CM

## 2024-03-06 DIAGNOSIS — R53.83 OTHER FATIGUE: ICD-10-CM

## 2024-03-06 DIAGNOSIS — M35.00 SJOGREN SYNDROME, UNSPECIFIED (MULTI): ICD-10-CM

## 2024-03-06 DIAGNOSIS — M54.9 DORSALGIA, UNSPECIFIED: ICD-10-CM

## 2024-03-06 DIAGNOSIS — D51.3 OTHER DIETARY VITAMIN B12 DEFICIENCY ANEMIA: Primary | ICD-10-CM

## 2024-03-06 DIAGNOSIS — M06.4 INFLAMMATORY POLYARTHROPATHY (MULTI): ICD-10-CM

## 2024-03-06 DIAGNOSIS — E55.9 VITAMIN D DEFICIENCY, UNSPECIFIED: ICD-10-CM

## 2024-03-06 DIAGNOSIS — E78.00 HIGH CHOLESTEROL: ICD-10-CM

## 2024-03-06 DIAGNOSIS — E78.5 HYPERLIPIDEMIA, UNSPECIFIED HYPERLIPIDEMIA TYPE: ICD-10-CM

## 2024-03-06 DIAGNOSIS — E03.9 HYPOTHYROIDISM, UNSPECIFIED TYPE: ICD-10-CM

## 2024-03-06 PROCEDURE — 82607 VITAMIN B-12: CPT

## 2024-03-06 PROCEDURE — 81381 HLA I TYPING 1 ALLELE HR: CPT

## 2024-03-06 PROCEDURE — 82306 VITAMIN D 25 HYDROXY: CPT

## 2024-03-06 PROCEDURE — 83540 ASSAY OF IRON: CPT

## 2024-03-06 PROCEDURE — 86140 C-REACTIVE PROTEIN: CPT

## 2024-03-06 PROCEDURE — 80053 COMPREHEN METABOLIC PANEL: CPT

## 2024-03-06 PROCEDURE — 72050 X-RAY EXAM NECK SPINE 4/5VWS: CPT

## 2024-03-06 PROCEDURE — 83550 IRON BINDING TEST: CPT

## 2024-03-06 PROCEDURE — 86481 TB AG RESPONSE T-CELL SUSP: CPT

## 2024-03-06 PROCEDURE — 80074 ACUTE HEPATITIS PANEL: CPT

## 2024-03-06 PROCEDURE — 82550 ASSAY OF CK (CPK): CPT

## 2024-03-06 PROCEDURE — 85652 RBC SED RATE AUTOMATED: CPT

## 2024-03-06 PROCEDURE — 86225 DNA ANTIBODY NATIVE: CPT

## 2024-03-06 PROCEDURE — 83036 HEMOGLOBIN GLYCOSYLATED A1C: CPT

## 2024-03-06 PROCEDURE — 86800 THYROGLOBULIN ANTIBODY: CPT

## 2024-03-06 PROCEDURE — 83970 ASSAY OF PARATHORMONE: CPT

## 2024-03-06 PROCEDURE — 86376 MICROSOMAL ANTIBODY EACH: CPT

## 2024-03-06 PROCEDURE — 80061 LIPID PANEL: CPT

## 2024-03-06 PROCEDURE — 72110 X-RAY EXAM L-2 SPINE 4/>VWS: CPT

## 2024-03-06 PROCEDURE — 86235 NUCLEAR ANTIGEN ANTIBODY: CPT

## 2024-03-06 PROCEDURE — 36415 COLL VENOUS BLD VENIPUNCTURE: CPT

## 2024-03-06 PROCEDURE — 72110 X-RAY EXAM L-2 SPINE 4/>VWS: CPT | Performed by: RADIOLOGY

## 2024-03-06 PROCEDURE — 82728 ASSAY OF FERRITIN: CPT

## 2024-03-06 PROCEDURE — 72050 X-RAY EXAM NECK SPINE 4/5VWS: CPT | Performed by: RADIOLOGY

## 2024-03-06 PROCEDURE — 73130 X-RAY EXAM OF HAND: CPT | Mod: 50

## 2024-03-06 PROCEDURE — 86038 ANTINUCLEAR ANTIBODIES: CPT

## 2024-03-06 PROCEDURE — 84550 ASSAY OF BLOOD/URIC ACID: CPT

## 2024-03-06 PROCEDURE — 73523 X-RAY EXAM HIPS BI 5/> VIEWS: CPT | Mod: BILATERAL PROCEDURE | Performed by: RADIOLOGY

## 2024-03-06 PROCEDURE — 84443 ASSAY THYROID STIM HORMONE: CPT

## 2024-03-06 PROCEDURE — 73523 X-RAY EXAM HIPS BI 5/> VIEWS: CPT

## 2024-03-06 PROCEDURE — 85025 COMPLETE CBC W/AUTO DIFF WBC: CPT

## 2024-03-06 PROCEDURE — 73130 X-RAY EXAM OF HAND: CPT | Mod: BILATERAL PROCEDURE | Performed by: RADIOLOGY

## 2024-03-07 LAB
25(OH)D3 SERPL-MCNC: 33 NG/ML (ref 30–100)
ALBUMIN SERPL BCP-MCNC: 3.7 G/DL (ref 3.4–5)
ALP SERPL-CCNC: 59 U/L (ref 33–136)
ALT SERPL W P-5'-P-CCNC: 14 U/L (ref 7–45)
ANION GAP SERPL CALC-SCNC: 14 MMOL/L (ref 10–20)
AST SERPL W P-5'-P-CCNC: 15 U/L (ref 9–39)
BASOPHILS # BLD AUTO: 0.11 X10*3/UL (ref 0–0.1)
BASOPHILS NFR BLD AUTO: 0.8 %
BILIRUB SERPL-MCNC: 0.5 MG/DL (ref 0–1.2)
BUN SERPL-MCNC: 25 MG/DL (ref 6–23)
CALCIUM SERPL-MCNC: 9.2 MG/DL (ref 8.6–10.6)
CHLORIDE SERPL-SCNC: 103 MMOL/L (ref 98–107)
CHOLEST SERPL-MCNC: 217 MG/DL (ref 0–199)
CHOLESTEROL/HDL RATIO: 4.5
CK SERPL-CCNC: 28 U/L (ref 0–215)
CO2 SERPL-SCNC: 26 MMOL/L (ref 21–32)
CREAT SERPL-MCNC: 1.28 MG/DL (ref 0.5–1.05)
CRP SERPL-MCNC: 2.06 MG/DL
DSDNA AB SER-ACNC: <1 IU/ML
EGFRCR SERPLBLD CKD-EPI 2021: 45 ML/MIN/1.73M*2
ENA SS-A AB SER IA-ACNC: <0.2 AI
ENA SS-B AB SER IA-ACNC: <0.2 AI
EOSINOPHIL # BLD AUTO: 0.33 X10*3/UL (ref 0–0.4)
EOSINOPHIL NFR BLD AUTO: 2.3 %
ERYTHROCYTE [DISTWIDTH] IN BLOOD BY AUTOMATED COUNT: 13.5 % (ref 11.5–14.5)
ERYTHROCYTE [SEDIMENTATION RATE] IN BLOOD BY WESTERGREN METHOD: 27 MM/H (ref 0–30)
EST. AVERAGE GLUCOSE BLD GHB EST-MCNC: 111 MG/DL
FERRITIN SERPL-MCNC: 98 NG/ML (ref 8–150)
GLUCOSE SERPL-MCNC: 83 MG/DL (ref 74–99)
HAV IGM SER QL: NONREACTIVE
HBA1C MFR BLD: 5.5 %
HBV CORE IGM SER QL: NONREACTIVE
HBV SURFACE AG SERPL QL IA: NONREACTIVE
HCT VFR BLD AUTO: 43.8 % (ref 36–46)
HCV AB SER QL: NONREACTIVE
HDLC SERPL-MCNC: 48 MG/DL
HGB BLD-MCNC: 14 G/DL (ref 12–16)
IMM GRANULOCYTES # BLD AUTO: 0.07 X10*3/UL (ref 0–0.5)
IMM GRANULOCYTES NFR BLD AUTO: 0.5 % (ref 0–0.9)
IRON SATN MFR SERPL: 15 % (ref 25–45)
IRON SERPL-MCNC: 50 UG/DL (ref 35–150)
LDLC SERPL CALC-MCNC: 133 MG/DL
LYMPHOCYTES # BLD AUTO: 2.94 X10*3/UL (ref 0.8–3)
LYMPHOCYTES NFR BLD AUTO: 20.4 %
MCH RBC QN AUTO: 29.3 PG (ref 26–34)
MCHC RBC AUTO-ENTMCNC: 32 G/DL (ref 32–36)
MCV RBC AUTO: 92 FL (ref 80–100)
MONOCYTES # BLD AUTO: 1.28 X10*3/UL (ref 0.05–0.8)
MONOCYTES NFR BLD AUTO: 8.9 %
NEUTROPHILS # BLD AUTO: 9.69 X10*3/UL (ref 1.6–5.5)
NEUTROPHILS NFR BLD AUTO: 67.1 %
NON HDL CHOLESTEROL: 169 MG/DL (ref 0–149)
NRBC BLD-RTO: 0 /100 WBCS (ref 0–0)
PLATELET # BLD AUTO: 418 X10*3/UL (ref 150–450)
POTASSIUM SERPL-SCNC: 4.1 MMOL/L (ref 3.5–5.3)
PROT SERPL-MCNC: 6.9 G/DL (ref 6.4–8.2)
PTH-INTACT SERPL-MCNC: 63.2 PG/ML (ref 18.5–88)
RBC # BLD AUTO: 4.78 X10*6/UL (ref 4–5.2)
SODIUM SERPL-SCNC: 139 MMOL/L (ref 136–145)
THYROPEROXIDASE AB SERPL-ACNC: <28 IU/ML
TIBC SERPL-MCNC: 337 UG/DL (ref 240–445)
TRIGL SERPL-MCNC: 179 MG/DL (ref 0–149)
TSH SERPL-ACNC: 1.61 MIU/L (ref 0.44–3.98)
UIBC SERPL-MCNC: 287 UG/DL (ref 110–370)
URATE SERPL-MCNC: 6.8 MG/DL (ref 2.3–6.7)
VIT B12 SERPL-MCNC: 407 PG/ML (ref 211–911)
VLDL: 36 MG/DL (ref 0–40)
WBC # BLD AUTO: 14.4 X10*3/UL (ref 4.4–11.3)

## 2024-03-08 LAB
ANA SER QL HEP2 SUBST: NEGATIVE
NIL(NEG) CONTROL SPOT COUNT: NORMAL
PANEL A SPOT COUNT: 0
PANEL B SPOT COUNT: 0
POS CONTROL SPOT COUNT: NORMAL
T-SPOT. TB INTERPRETATION: NEGATIVE
THYROGLOB AB SERPL-ACNC: <0.9 IU/ML (ref 0–4)

## 2024-03-12 ENCOUNTER — OFFICE VISIT (OUTPATIENT)
Dept: PRIMARY CARE | Facility: CLINIC | Age: 74
End: 2024-03-12
Payer: MEDICARE

## 2024-03-12 ENCOUNTER — OFFICE VISIT (OUTPATIENT)
Dept: OTOLARYNGOLOGY | Facility: CLINIC | Age: 74
End: 2024-03-12
Payer: MEDICARE

## 2024-03-12 VITALS — BODY MASS INDEX: 40.12 KG/M2 | HEIGHT: 64 IN | WEIGHT: 235 LBS

## 2024-03-12 VITALS
WEIGHT: 238 LBS | HEIGHT: 66 IN | BODY MASS INDEX: 38.25 KG/M2 | SYSTOLIC BLOOD PRESSURE: 136 MMHG | DIASTOLIC BLOOD PRESSURE: 72 MMHG

## 2024-03-12 DIAGNOSIS — E78.00 HIGH CHOLESTEROL: ICD-10-CM

## 2024-03-12 DIAGNOSIS — T78.3XXD ANGIOEDEMA, SUBSEQUENT ENCOUNTER: ICD-10-CM

## 2024-03-12 DIAGNOSIS — H90.3 BILATERAL SENSORINEURAL HEARING LOSS: ICD-10-CM

## 2024-03-12 DIAGNOSIS — I10 HYPERTENSION, UNSPECIFIED TYPE: ICD-10-CM

## 2024-03-12 DIAGNOSIS — T78.3XXA IDIOPATHIC ANGIOEDEMA, INITIAL ENCOUNTER: Primary | ICD-10-CM

## 2024-03-12 DIAGNOSIS — H93.12 TINNITUS OF LEFT EAR: ICD-10-CM

## 2024-03-12 DIAGNOSIS — G89.29 OTHER CHRONIC PAIN: ICD-10-CM

## 2024-03-12 DIAGNOSIS — R49.0 HOARSENESS: ICD-10-CM

## 2024-03-12 DIAGNOSIS — M19.90 OSTEOARTHRITIS, UNSPECIFIED OSTEOARTHRITIS TYPE, UNSPECIFIED SITE: ICD-10-CM

## 2024-03-12 DIAGNOSIS — E03.9 HYPOTHYROIDISM, UNSPECIFIED TYPE: ICD-10-CM

## 2024-03-12 DIAGNOSIS — N28.9 RENAL INSUFFICIENCY: ICD-10-CM

## 2024-03-12 DIAGNOSIS — N18.9 CHRONIC RENAL FAILURE, UNSPECIFIED CKD STAGE: Primary | ICD-10-CM

## 2024-03-12 LAB — HLAB27 TYPING: NEGATIVE

## 2024-03-12 PROCEDURE — 3075F SYST BP GE 130 - 139MM HG: CPT | Performed by: INTERNAL MEDICINE

## 2024-03-12 PROCEDURE — 1160F RVW MEDS BY RX/DR IN RCRD: CPT | Performed by: OTOLARYNGOLOGY

## 2024-03-12 PROCEDURE — 1125F AMNT PAIN NOTED PAIN PRSNT: CPT | Performed by: OTOLARYNGOLOGY

## 2024-03-12 PROCEDURE — 1160F RVW MEDS BY RX/DR IN RCRD: CPT | Performed by: INTERNAL MEDICINE

## 2024-03-12 PROCEDURE — 3078F DIAST BP <80 MM HG: CPT | Performed by: INTERNAL MEDICINE

## 2024-03-12 PROCEDURE — 99214 OFFICE O/P EST MOD 30 MIN: CPT | Performed by: OTOLARYNGOLOGY

## 2024-03-12 PROCEDURE — 1036F TOBACCO NON-USER: CPT | Performed by: INTERNAL MEDICINE

## 2024-03-12 PROCEDURE — 99214 OFFICE O/P EST MOD 30 MIN: CPT | Performed by: INTERNAL MEDICINE

## 2024-03-12 PROCEDURE — 1159F MED LIST DOCD IN RCRD: CPT | Performed by: INTERNAL MEDICINE

## 2024-03-12 PROCEDURE — 1159F MED LIST DOCD IN RCRD: CPT | Performed by: OTOLARYNGOLOGY

## 2024-03-12 PROCEDURE — 31575 DIAGNOSTIC LARYNGOSCOPY: CPT | Performed by: OTOLARYNGOLOGY

## 2024-03-12 PROCEDURE — 1036F TOBACCO NON-USER: CPT | Performed by: OTOLARYNGOLOGY

## 2024-03-12 RX ORDER — HYDROXYZINE HYDROCHLORIDE 10 MG/1
10 TABLET, FILM COATED ORAL 3 TIMES DAILY
COMMUNITY
Start: 2024-03-05 | End: 2024-03-27 | Stop reason: ALTCHOICE

## 2024-03-12 ASSESSMENT — ENCOUNTER SYMPTOMS
LOSS OF SENSATION IN FEET: 0
DEPRESSION: 0
OCCASIONAL FEELINGS OF UNSTEADINESS: 0

## 2024-03-12 NOTE — PROGRESS NOTES
Chief Complaint   Patient presents with    THROAT ISSUES     EP 10/9/23 W/ [facility]- THROAT ISSUES/SWELLING     HPI:  Gretta Wilks is a 73 y.o. female who presents today for evaluation of her larynx.  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  She treats herself with Benadryl, steroids, and rarely an EpiPen.  She also does see an allergy/immunologist and has had extensive workup both here and away for this problem.  She was recommended to follow-up with ENT to get visualization of the larynx for some hoarseness which has been present since about August.  She reports having a chemical exposure then which did trigger some recurrent angioedema.  No current shortness of breath or stridor.  No sore throat or dysphagia.  She continues to have some left-sided tinnitus after concussion last fall    PMH:  Past Medical History:   Diagnosis Date    Abnormal findings on diagnostic imaging of skull and head, not elsewhere classified 11/26/2019    Abnormal MRI of head    Angioedema     Arthritis     Body mass index (BMI) 38.0-38.9, adult     BMI 38.0-38.9,adult    Headache, unspecified 09/18/2019    Headache    High cholesterol     History of angioedema     Hypertension     Hypothyroidism     Low back pain, unspecified 06/25/2015    Acute low back pain due to trauma    Migraine with aura, not intractable, without status migrainosus 12/12/2022    Migraine with aura and without status migrainosus, not intractable    Nausea 10/22/2015    Nausea    Neuropathic pain     Obesity     Pain in unspecified hip 08/22/2016    Hip pain    Personal history of other diseases of the digestive system 08/14/2013    History of gastroenteritis    Personal history of other diseases of the respiratory system 11/17/2014    History of acute sinusitis    Personal history of other diseases of the respiratory system 07/27/2015    History of acute bronchitis    Personal  history of other diseases of urinary system 10/10/2013    History of hematuria    Personal history of other infectious and parasitic diseases 09/30/2015    History of athlete's foot    Personal history of other infectious and parasitic diseases 11/04/2015    History of candidiasis of mouth    Personal history of other specified conditions 11/30/2016    History of abdominal pain    Personal history of other specified conditions 02/06/2014    History of insomnia    Personal history of other specified conditions 10/07/2013    History of abdominal pain    Personal history of other specified conditions 10/10/2013    History of urinary frequency    Personal history of other specified conditions 10/22/2013    History of dizziness    Personal history of other specified conditions 10/22/2013    History of syncope    Postmenopausal      Past Surgical History:   Procedure Laterality Date    KNEE SURGERY  09/23/2013    Knee Surgery    MR HEAD ANGIO WO IV CONTRAST  9/8/2021    MR HEAD ANGIO WO IV CONTRAST [facility]    OTHER SURGICAL HISTORY  09/23/2013    Biopsy Tongue    OTHER SURGICAL HISTORY  09/23/2013    Gynecologic Laparoscopy With Adhesiolysis    TONSILLECTOMY  02/09/2015    Tonsillectomy         Medications:     Current Outpatient Medications:     acetaminophen (Tylenol) 325 mg tablet, Take 2 tablets (650 mg) by mouth every 6 hours if needed for mild pain (1 - 3)., Disp: 30 tablet, Rfl: 0    albuterol 90 mcg/actuation inhaler, Inhale 2 puffs 3 times a day., Disp: 18 g, Rfl: 3    amLODIPine (Norvasc) 5 mg tablet, Take 1 tablet (5 mg) by mouth once daily., Disp: 90 tablet, Rfl: 3    diphenhydrAMINE (Benadryl Allergy) 25 mg tablet, Take 1 tablet (25 mg) by mouth early in the morning.. Do not start before October 30, 2023., Disp: 1 tablet, Rfl: 0    EPINEPHrine 0.3 mg/0.3 mL injection syringe, Inject 0.3 mL (0.3 mg) into the muscle if needed for anaphylaxis. Inject into upper leg. Call 911 after use., Disp: 1 mL,  Rfl: 0    ergocalciferol (Vitamin D-2) 1.25 MG (19142 UT) capsule, Take 1 capsule (1,250 mcg) by mouth 1 (one) time per week. Do not start before November 2, 2023., Disp: 1 capsule, Rfl: 0    fexofenadine (Allegra Allergy) 180 mg tablet, Take 1 tablet (180 mg) by mouth once daily. (Patient taking differently: Take 1 tablet (180 mg) by mouth once daily. Takes 4 tablets per day.  AM & PM), Disp: 30 tablet, Rfl: 3    fluticasone furoate-vilanteroL (Breo Ellipta) 200-25 mcg/dose inhaler, Inhale 1 puff once daily., Disp: 1 each, Rfl: 0    furosemide (Lasix) 40 mg tablet, Take 1 tablet (40 mg) by mouth once daily., Disp: 30 tablet, Rfl: 5    gabapentin (Neurontin) 300 mg capsule, Take 1 capsule (300 mg) by mouth in the morning and 1 capsule (300 mg) in the evening and 1 capsule (300 mg) before bedtime., Disp: 90 capsule, Rfl: 0    ipratropium-albuteroL (Duo-Neb) 0.5-2.5 mg/3 mL nebulizer solution, Take 3 mL by nebulization 3 times a day as needed for wheezing., Disp: 180 mL, Rfl: 11    levothyroxine (Synthroid, Levoxyl) 125 mcg tablet, Take 1 tablet (125 mcg) by mouth once daily., Disp: 90 tablet, Rfl: 1    magnesium oxide (Mag-Ox) 400 mg (241.3 mg magnesium) tablet, Take 1 tablet (400 mg) by mouth once daily., Disp: 90 tablet, Rfl: 1    meclizine (Antivert) 25 mg tablet, Take 1 tablet (25 mg) by mouth 3 times a day., Disp: , Rfl:     meloxicam (Mobic) 15 mg tablet, Take 1 tablet (15 mg) by mouth once daily., Disp: 30 tablet, Rfl: 0    metoclopramide (Reglan) 10 mg tablet, Take 1 tablet (10 mg) by mouth 2 times a day as needed (nausea)., Disp: 30 tablet, Rfl: 1    metoprolol tartrate (Lopressor) 25 mg tablet, TAKE 1 TABLET BY MOUTH TWICE  DAILY, Disp: 180 tablet, Rfl: 3    omalizumab (Xolair) 150 mg injection, Inject under the skin every 28 (twenty-eight) days., Disp: , Rfl:     ondansetron (Zofran) 4 mg tablet, Take 1 tablet (4 mg) by mouth every 8 hours if needed for nausea or vomiting., Disp: 60 tablet, Rfl: 0     "ondansetron ODT (Zofran-ODT) 4 mg disintegrating tablet, Take 1 tablet (4 mg) by mouth every 8 hours if needed for nausea or vomiting., Disp: 270 tablet, Rfl: 3    pantoprazole (ProtoNix) 40 mg EC tablet, Take 1 tablet (40 mg) by mouth once daily., Disp: 90 tablet, Rfl: 0    bacitracin 500 unit/gram ointment, Apply topically 2 times a day., Disp: 14 g, Rfl: 0    butalbital-acetaminophen-caff -40 mg tablet, Take 2 tablets by mouth once daily as needed for headaches or migraine., Disp: 20 tablet, Rfl: 0    hydrOXYzine HCL (Atarax) 10 mg tablet, Take 1 tablet (10 mg) by mouth 3 times a day., Disp: , Rfl:     montelukast (Singulair) 10 mg tablet, Take 1 tablet (10 mg) by mouth once daily at bedtime., Disp: 30 tablet, Rfl: 3     Allergies:  Allergies   Allergen Reactions    Dexamethasone Bleeding    Formaldehyde Anaphylaxis    Levofloxacin Swelling     Depression    Cefepime Rash    Colchicine Swelling    Hydromorphone Nausea/vomiting    Indomethacin Swelling    Penicillins Hives    Sulfa (Sulfonamide Antibiotics) Hives and Other    Methotrexate Nausea Only    Other Unknown     Allergy: Cardiac Monitor Leads, Reaction: Unknown  Allergy: Fiberglass, Reaction: Moderate/Swelling  Allergy: Resins/Plastics, Reaction: Severe/Anaphylaxis    No Known Food Allergies    Ubrelvy [Ubrogepant] Unknown    Prochlorperazine Unknown        ROS:  Review of systems normal unless stated otherwise in the HPI and/or PMH.    Physical Exam:  Height 1.626 m (5' 4\"), weight 107 kg (235 lb), not currently breastfeeding. Body mass index is 40.34 kg/m².     GENERAL APPEARANCE: Well developed and well nourished.  Alert and oriented in no acute distress.  Normal vocal quality.      HEAD/FACE: No erythema or edema or facial tenderness.  Normal facial nerve function bilaterally.    EAR:       EXTERNAL: Normal pinnas and external auditory canals without lesion or obstructing wax.       MIDDLE EAR: Tympanic membranes intact and mobile with normal " landmarks.  Middle ear space appears well aerated.       TUBE STATUS: N/A       MASTOID CAVITY: N/A       HEARING: Gross hearing assessment is within normal limits.      NOSE:       VISUALIZED USING: Anterior rhinoscopy with headlight and nasal speculum.       DORSUM: Midline, nontraumatic appearance.       MUCOSA: Normal-appearing.       SECRETIONS: Normal.       SEPTUM: Midline and nonobstructing.       INFERIOR TURBINATES: Normal.       MIDDLE TURBINATES/MEATUS: N/A       BLEEDING: N/A         ORAL CAVITY/PHARYNX:       TEETH: Adequate dentition.       TONGUE: No mass or lesion.  Normal mobility.       FLOOR OF MOUTH: No mass or lesion.       PALATE: Normal hard palate, soft palate, and uvula.       OROPHARYNX: Normal without mass or lesion.  Tonsils absent       BUCCAL MUCOSA/GBS: Normal without mass or lesion.       LIPS: Normal.    LARYNX/HYPOPHARYNX/NASOPHARYNX: Flexible laryngoscopy was performed after consent secondary to an inadequate mirror examination.  No topical medication applied.  The flexible laryngoscope was placed through the nasal cavity revealing normal nasopharynx, normal oropharynx, normal hypopharynx, and normal larynx.  There is normal bilateral vocal cord mobility without any mucosal masses or lesions.    NECK: No palpable masses or abnormal adenopathy.  Trachea is midline.    THYROID: No thyromegaly or palpable nodule.    SALIVARY GLANDS: Normal bilateral parotid and submandibular glands by inspection and palpation.    TMJ's: Normal.    NEURO: Cranial nerve exam grossly normal bilaterally.       Assessment/Plan   Gretta was seen today for throat issues.  Diagnoses and all orders for this visit:  Idiopathic angioedema, initial encounter (Primary)  Hoarseness  Bilateral sensorineural hearing loss  Tinnitus of left ear     Reassured her oral cavity, oropharynx, and larynx are quite normal on examination today including scoping.  No evidence of any neoplasm or edema today.  Follow up if  symptoms worsen or fail to improve.     Kevin Reyes MD

## 2024-03-13 NOTE — PROGRESS NOTES
"Subjective   Patient ID: Dixie Wilks is a 73 y.o. female who presents for Follow-up (Other conditions) and Multiple medical issues.    Ms. Gretta Wilks today came here for multiple medical issues.  She saw Dr. Rubin.  He thinks she has psoriatic arthritis, workup started, but she is also worried about her kidney.  I made a note, she is on Mobic.  Other than that, appetite and weight are okay.  No problem.  Follow-up on other conditions.    I have personally reviewed the patient's Past Medical History, Medications, Allergies, Social History, and Family History in the EMR.    Review of Systems   All other systems reviewed and are negative.    Objective   /72   Ht 1.676 m (5' 6\")   Wt 108 kg (238 lb)   BMI 38.41 kg/m²     Physical Exam  Vitals reviewed.   Cardiovascular:      Heart sounds: Normal heart sounds, S1 normal and S2 normal. No murmur heard.     No friction rub.   Pulmonary:      Effort: Pulmonary effort is normal.      Breath sounds: Normal breath sounds and air entry.   Abdominal:      Palpations: There is no hepatomegaly, splenomegaly or mass.   Musculoskeletal:      Right lower leg: No edema.      Left lower leg: No edema.   Lymphadenopathy:      Lower Body: No right inguinal adenopathy. No left inguinal adenopathy.   Neurological:      Cranial Nerves: Cranial nerves 2-12 are intact.      Sensory: No sensory deficit.      Motor: Motor function is intact.      Deep Tendon Reflexes: Reflexes are normal and symmetric.     LAB WORK: Laboratory testing discussed.    Assessment/Plan   Problem List Items Addressed This Visit             ICD-10-CM       Allergies and Adverse Reactions    Angioedema T78.3XXA       Cardiac and Vasculature    Hypertension I10       Endocrine/Metabolic    Hypothyroidism E03.9       Genitourinary and Reproductive    Renal insufficiency N28.9    Relevant Orders    Comprehensive Metabolic Panel    US renal complete    US bladder    Albumin, 24 Hour Urine     Other Visit " Diagnoses         Codes    Chronic renal failure, unspecified CKD stage    -  Primary N18.9    High cholesterol     E78.00    Osteoarthritis, unspecified osteoarthritis type, unspecified site     M19.90    Other chronic pain     G89.29        1. Chronic renal failure, stage 2 to 3, slowly going down.  We will agree with Dr. Rubin.  I ordered ultrasound of kidney and bladder.  24-hour urine ordered.  Referred to Dr. Rubin.  We will monitor.  2. Hypertension, okay.  3. High cholesterol, stable.  4. Thyroid, okay.  5. Osteoarthritis, on medication.  6. I urged her we will have to cut down Mobic or avoid, she understands.  7. Chronic pain.  She is on medication.  8. Angioedema.  She is under treatment.  9. Follow-up appointment with me in a week after above testing.    Scribe Attestation  By signing my name below, IAmada Scribe attest that this documentation has been prepared under the direction and in the presence of Yarelis Blanco MD.

## 2024-03-15 ENCOUNTER — APPOINTMENT (OUTPATIENT)
Dept: LAB | Facility: LAB | Age: 74
End: 2024-03-15
Payer: MEDICARE

## 2024-03-15 ENCOUNTER — OFFICE VISIT (OUTPATIENT)
Dept: PRIMARY CARE | Facility: CLINIC | Age: 74
End: 2024-03-15
Payer: MEDICARE

## 2024-03-15 ENCOUNTER — LAB (OUTPATIENT)
Dept: LAB | Facility: LAB | Age: 74
End: 2024-03-15
Payer: MEDICARE

## 2024-03-15 VITALS
HEIGHT: 66 IN | BODY MASS INDEX: 38.25 KG/M2 | SYSTOLIC BLOOD PRESSURE: 128 MMHG | WEIGHT: 238 LBS | DIASTOLIC BLOOD PRESSURE: 72 MMHG

## 2024-03-15 DIAGNOSIS — I10 HYPERTENSION, UNSPECIFIED TYPE: ICD-10-CM

## 2024-03-15 DIAGNOSIS — R60.9 EDEMA, UNSPECIFIED TYPE: ICD-10-CM

## 2024-03-15 DIAGNOSIS — L03.116 BILATERAL LOWER LEG CELLULITIS: ICD-10-CM

## 2024-03-15 DIAGNOSIS — L03.115 BILATERAL LOWER LEG CELLULITIS: ICD-10-CM

## 2024-03-15 DIAGNOSIS — L97.909 VENOUS STASIS ULCER, UNSPECIFIED SITE, UNSPECIFIED ULCER STAGE, UNSPECIFIED WHETHER VARICOSE VEINS PRESENT (MULTI): Primary | ICD-10-CM

## 2024-03-15 DIAGNOSIS — I83.009 VENOUS STASIS ULCER, UNSPECIFIED SITE, UNSPECIFIED ULCER STAGE, UNSPECIFIED WHETHER VARICOSE VEINS PRESENT (MULTI): Primary | ICD-10-CM

## 2024-03-15 LAB
ALBUMIN SERPL BCP-MCNC: 3.7 G/DL (ref 3.4–5)
ALP SERPL-CCNC: 57 U/L (ref 33–136)
ALT SERPL W P-5'-P-CCNC: 17 U/L (ref 7–45)
ANION GAP SERPL CALC-SCNC: 10 MMOL/L (ref 10–20)
AST SERPL W P-5'-P-CCNC: 15 U/L (ref 9–39)
BASOPHILS # BLD AUTO: 0.1 X10*3/UL (ref 0–0.1)
BASOPHILS NFR BLD AUTO: 0.8 %
BILIRUB SERPL-MCNC: 0.5 MG/DL (ref 0–1.2)
BUN SERPL-MCNC: 15 MG/DL (ref 6–23)
CALCIUM SERPL-MCNC: 9 MG/DL (ref 8.6–10.6)
CHLORIDE SERPL-SCNC: 102 MMOL/L (ref 98–107)
CO2 SERPL-SCNC: 31 MMOL/L (ref 21–32)
CREAT SERPL-MCNC: 1.18 MG/DL (ref 0.5–1.05)
EGFRCR SERPLBLD CKD-EPI 2021: 49 ML/MIN/1.73M*2
EOSINOPHIL # BLD AUTO: 0.34 X10*3/UL (ref 0–0.4)
EOSINOPHIL NFR BLD AUTO: 2.9 %
ERYTHROCYTE [DISTWIDTH] IN BLOOD BY AUTOMATED COUNT: 13.6 % (ref 11.5–14.5)
GLUCOSE SERPL-MCNC: 96 MG/DL (ref 74–99)
HCT VFR BLD AUTO: 40.9 % (ref 36–46)
HGB BLD-MCNC: 13 G/DL (ref 12–16)
IMM GRANULOCYTES # BLD AUTO: 0.04 X10*3/UL (ref 0–0.5)
IMM GRANULOCYTES NFR BLD AUTO: 0.3 % (ref 0–0.9)
LYMPHOCYTES # BLD AUTO: 2.14 X10*3/UL (ref 0.8–3)
LYMPHOCYTES NFR BLD AUTO: 18.2 %
MCH RBC QN AUTO: 29 PG (ref 26–34)
MCHC RBC AUTO-ENTMCNC: 31.8 G/DL (ref 32–36)
MCV RBC AUTO: 91 FL (ref 80–100)
MONOCYTES # BLD AUTO: 1.26 X10*3/UL (ref 0.05–0.8)
MONOCYTES NFR BLD AUTO: 10.7 %
NEUTROPHILS # BLD AUTO: 7.91 X10*3/UL (ref 1.6–5.5)
NEUTROPHILS NFR BLD AUTO: 67.1 %
NRBC BLD-RTO: 0 /100 WBCS (ref 0–0)
PLATELET # BLD AUTO: 323 X10*3/UL (ref 150–450)
POTASSIUM SERPL-SCNC: 4.1 MMOL/L (ref 3.5–5.3)
PROT SERPL-MCNC: 6.5 G/DL (ref 6.4–8.2)
RBC # BLD AUTO: 4.48 X10*6/UL (ref 4–5.2)
SODIUM SERPL-SCNC: 139 MMOL/L (ref 136–145)
WBC # BLD AUTO: 11.8 X10*3/UL (ref 4.4–11.3)

## 2024-03-15 PROCEDURE — 85025 COMPLETE CBC W/AUTO DIFF WBC: CPT

## 2024-03-15 PROCEDURE — 80053 COMPREHEN METABOLIC PANEL: CPT

## 2024-03-15 PROCEDURE — 1160F RVW MEDS BY RX/DR IN RCRD: CPT | Performed by: INTERNAL MEDICINE

## 2024-03-15 PROCEDURE — 99214 OFFICE O/P EST MOD 30 MIN: CPT | Performed by: INTERNAL MEDICINE

## 2024-03-15 PROCEDURE — 3078F DIAST BP <80 MM HG: CPT | Performed by: INTERNAL MEDICINE

## 2024-03-15 PROCEDURE — 1159F MED LIST DOCD IN RCRD: CPT | Performed by: INTERNAL MEDICINE

## 2024-03-15 PROCEDURE — 3074F SYST BP LT 130 MM HG: CPT | Performed by: INTERNAL MEDICINE

## 2024-03-15 PROCEDURE — 36415 COLL VENOUS BLD VENIPUNCTURE: CPT

## 2024-03-15 PROCEDURE — 1036F TOBACCO NON-USER: CPT | Performed by: INTERNAL MEDICINE

## 2024-03-15 RX ORDER — CEPHALEXIN 500 MG/1
500 CAPSULE ORAL 3 TIMES DAILY
Qty: 30 CAPSULE | Refills: 0 | Status: SHIPPED | OUTPATIENT
Start: 2024-03-15 | End: 2024-03-27 | Stop reason: ALTCHOICE

## 2024-03-15 ASSESSMENT — ENCOUNTER SYMPTOMS
OCCASIONAL FEELINGS OF UNSTEADINESS: 0
LOSS OF SENSATION IN FEET: 0
DEPRESSION: 0

## 2024-03-16 NOTE — PROGRESS NOTES
"Subjective   Patient ID: Dixie Wilks is a 73 y.o. female who presents for multiple medical issues.    Gretta Wilks today came here for multiple medical issues.  Both legs are swelling, red, inflamed, tender, cellulitis.  Local temperature raised.  She came for follow-up on various conditions.    I have personally reviewed the patient's Past Medical History, Medications, Allergies, Social History, and Family History in the EMR.    Review of Systems   All other systems reviewed and are negative.    Objective   /72   Ht 1.676 m (5' 6\")   Wt 108 kg (238 lb)   BMI 38.41 kg/m²     Physical Exam  Vitals reviewed.   Cardiovascular:      Heart sounds: Normal heart sounds, S1 normal and S2 normal. No murmur heard.     No friction rub.   Pulmonary:      Effort: Pulmonary effort is normal.      Breath sounds: Normal breath sounds and air entry.   Abdominal:      Palpations: There is no hepatomegaly, splenomegaly or mass.   Musculoskeletal:      Right lower leg: No edema.      Left lower leg: No edema.   Lymphadenopathy:      Lower Body: No right inguinal adenopathy. No left inguinal adenopathy.   Skin:     Comments: Below knee, above ankle red, inflamed, tender, cellulitis present.  Local temperature raised.   Neurological:      Cranial Nerves: Cranial nerves 2-12 are intact.      Sensory: No sensory deficit.      Motor: Motor function is intact.      Deep Tendon Reflexes: Reflexes are normal and symmetric.     LAB WORK: Laboratory testing discussed.    Assessment/Plan   Problem List Items Addressed This Visit             ICD-10-CM       Cardiac and Vasculature    Hypertension I10    Relevant Orders    CBC and Auto Differential    Comprehensive Metabolic Panel    Urinalysis with Reflex Microscopic       Infectious Diseases    Cellulitis L03.90    Relevant Medications    cephalexin (Keflex) 500 mg capsule       Symptoms and Signs    Edema R60.9     Other Visit Diagnoses         Codes    Venous stasis ulcer, unspecified " site, unspecified ulcer stage, unspecified whether varicose veins present (CMS/[facility])    -  Primary I83.009, L98.038        1. Bilateral leg cellulitis and infection.  We discussed.  I put her on Keflex.  We will try.  Blood count, chemistry ordered.  She told me that she has taken cephalosporin in the past.  No problem.  2. Chronic venous stasis ulcer.  She has seen Vascular Surgery.  Monitor.  3. Keep leg elevated.  Not put on weight.  Monitor.  4. Edema.  Lasix, potassium.  5. Continue to follow.    Scribe Attestation  By signing my name below, I, Agusto Koo attest that this documentation has been prepared under the direction and in the presence of Yarelis Blanco MD.

## 2024-03-22 DIAGNOSIS — G43.009 MIGRAINE WITHOUT AURA AND WITHOUT STATUS MIGRAINOSUS, NOT INTRACTABLE: ICD-10-CM

## 2024-03-22 RX ORDER — BUTALBITAL, ACETAMINOPHEN AND CAFFEINE 50; 325; 40 MG/1; MG/1; MG/1
2 TABLET ORAL DAILY PRN
Qty: 20 TABLET | Refills: 0 | Status: SHIPPED | OUTPATIENT
Start: 2024-03-22 | End: 2024-05-10 | Stop reason: SDUPTHER

## 2024-03-25 ENCOUNTER — APPOINTMENT (OUTPATIENT)
Dept: RADIOLOGY | Facility: CLINIC | Age: 74
End: 2024-03-25
Payer: MEDICARE

## 2024-03-26 ENCOUNTER — APPOINTMENT (OUTPATIENT)
Dept: NEUROLOGY | Facility: CLINIC | Age: 74
End: 2024-03-26
Payer: COMMERCIAL

## 2024-03-27 ENCOUNTER — OFFICE VISIT (OUTPATIENT)
Dept: ENDOCRINOLOGY | Facility: CLINIC | Age: 74
End: 2024-03-27
Payer: MEDICARE

## 2024-03-27 VITALS
RESPIRATION RATE: 16 BRPM | BODY MASS INDEX: 39.31 KG/M2 | WEIGHT: 244.6 LBS | DIASTOLIC BLOOD PRESSURE: 70 MMHG | SYSTOLIC BLOOD PRESSURE: 134 MMHG | HEIGHT: 66 IN | HEART RATE: 55 BPM

## 2024-03-27 DIAGNOSIS — E03.9 HYPOTHYROIDISM, UNSPECIFIED TYPE: Primary | ICD-10-CM

## 2024-03-27 PROCEDURE — 3078F DIAST BP <80 MM HG: CPT | Performed by: INTERNAL MEDICINE

## 2024-03-27 PROCEDURE — 3075F SYST BP GE 130 - 139MM HG: CPT | Performed by: INTERNAL MEDICINE

## 2024-03-27 PROCEDURE — 1159F MED LIST DOCD IN RCRD: CPT | Performed by: INTERNAL MEDICINE

## 2024-03-27 PROCEDURE — 99213 OFFICE O/P EST LOW 20 MIN: CPT | Performed by: INTERNAL MEDICINE

## 2024-03-27 PROCEDURE — 1160F RVW MEDS BY RX/DR IN RCRD: CPT | Performed by: INTERNAL MEDICINE

## 2024-03-27 PROCEDURE — 1036F TOBACCO NON-USER: CPT | Performed by: INTERNAL MEDICINE

## 2024-03-27 RX ORDER — LEVOTHYROXINE SODIUM 125 UG/1
125 TABLET ORAL DAILY
Qty: 90 TABLET | Refills: 3 | Status: SHIPPED | OUTPATIENT
Start: 2024-03-27 | End: 2025-03-27

## 2024-03-27 ASSESSMENT — ENCOUNTER SYMPTOMS
PALPITATIONS: 0
DIARRHEA: 0
FEVER: 0
NAUSEA: 0
HEADACHES: 0
CHILLS: 0
VOMITING: 0
COUGH: 0
FATIGUE: 0
SHORTNESS OF BREATH: 0

## 2024-03-27 NOTE — PROGRESS NOTES
"Endocrinology: Follow up visit  Subjective   Patient ID: Gretta Wilks \"Juvenal" is a 74 y.o. female who presents for Hypothyroidism and Hashimoto's Thyroiditis.    PCP: Yarelis Blanco MD    HPI  Here for follow up hypothyroidism s/p bloom for graves many years ago.   Since last visit taking brand synthroid and doing well on it.   Recently dxd with psoriatic arthritis and working on starting a biologic.  Nervous but optimistic.       Review of Systems   Constitutional:  Negative for chills, fatigue and fever.   Respiratory:  Negative for cough and shortness of breath.    Cardiovascular:  Negative for chest pain and palpitations.   Gastrointestinal:  Negative for diarrhea, nausea and vomiting.   Neurological:  Negative for headaches.       Patient Active Problem List   Diagnosis    Abnormal mammogram    Age-related nuclear cataract of both eyes    Allergic contact dermatitis due to other agents    Allergy    Angioedema    Anxiety    Edema of upper extremity    Leg swelling    Arthritis of carpometacarpal (CMC) joint of left thumb    Arthritis of knee, right    Atypical ductal hyperplasia of breast    Baker's cyst of knee    Carpal tunnel syndrome on left    Cellulitis    Cervicocranial syndrome    Chest pain    Chronic migraine without aura, intractable, without status migrainosus    Closed displaced spiral fracture of shaft of left humerus    Closed fracture of left distal humerus    Decreased ROM of left elbow    Decreased ROM of left shoulder    Dizziness    Vertigo    Dyspnea    Ear ringing    Tinnitus of left ear    Edema    Extrinsic asthma without complication    Chronic fatigue syndrome    Fatigue    GERD (gastroesophageal reflux disease)    Hemangioma    History of lumpectomy of right breast    Hypothyroidism    Imbalance    Injury of hand, right    Lesion of brain    Migraine with aura    Migraine without aura    Localized primary osteoarthritis of right hand    Primary localized osteoarthrosis of shoulder " region    Metatarsal stress fracture    Nail fungus    Neuropathy of left radial nerve    Numbness and tingling in left hand    Obesity    Osteoporosis    Plantar fasciitis, left    PONV (postoperative nausea and vomiting)    Primary hypertension    Fracture of right humerus    Proximal humerus fracture    Rash    Ankle pain    Torn ACL    Urine frequency    Vitamin D deficiency    Yeast infection    Asthma    Clawtoe, acquired    Phlebitis    Plantar plate injury    UTI symptoms    Vitreous detachment of right eye    Hand arthritis    Arthritis of knee, left    Carpal tunnel syndrome of left wrist    Feeling weak    Neck swelling    Chronic pain of right knee    Pain of left humerus    Migraine with aura and without status migrainosus, not intractable    Hypertension    Accidental fall    Ankylosis of left shoulder    Antinuclear antibody (EMILY) titer greater than 1:80    Autoimmune disease (CMS/HCC)    Autoimmune thyroiditis    Cervical spondylosis    History of pernicious anemia    Primary generalized hypertrophic osteoarthrosis    Primary osteoarthritis of knees, bilateral    Sjogren syndrome with keratoconjunctivitis (CMS/HCC)    Systemic lupus erythematosus (CMS/HCC)    Primary osteoarthritis of right knee    Primary osteoarthritis, left hand    Tenosynovitis of right hand    Fibromyalgia syndrome    Unsteady gait    Claudication (CMS/HCC)    Cellulitis and abscess of lower extremity    Arm swelling    Class 2 obesity with body mass index (BMI) of 36.0 to 36.9 in adult    Medication management    Post-menopausal    Pes anserinus bursitis of left knee    Endocarditis and heart valve disorders in diseases classified elsewhere    Renal insufficiency    HAYS (dyspnea on exertion)        Home Meds:  Current Outpatient Medications   Medication Instructions    acetaminophen (TYLENOL) 650 mg, oral, Every 6 hours PRN    albuterol 90 mcg/actuation inhaler 2 puffs, inhalation, 3 times daily RT    amLODIPine (NORVASC) 5 mg,  oral, Daily    bacitracin 500 unit/gram ointment Topical, 2 times daily    butalbital-acetaminophen-caff -40 mg tablet 2 tablets, oral, Daily PRN    diphenhydrAMINE (BENADRYL ALLERGY) 25 mg, oral, Daily    EPINEPHrine (EPIPEN) 0.3 mg, intramuscular, As needed, Inject into upper leg. Call 911 after use.    ergocalciferol (VITAMIN D-2) 1,250 mcg, oral, Weekly    fexofenadine (ALLEGRA ALLERGY) 180 mg, oral, Daily    fluticasone furoate-vilanteroL (Breo Ellipta) 200-25 mcg/dose inhaler 1 puff, inhalation, Daily RT    furosemide (LASIX) 40 mg, oral, Daily    gabapentin (NEURONTIN) 300 mg, oral, 3 times daily    ipratropium-albuteroL (Duo-Neb) 0.5-2.5 mg/3 mL nebulizer solution 3 mL, nebulization, 3 times daily PRN    levothyroxine (SYNTHROID, LEVOXYL) 125 mcg, oral, Daily    magnesium oxide (MAG-OX) 400 mg, oral, Daily    meclizine (Antivert) 25 mg tablet 1 tablet, oral, 3 times daily    meloxicam (MOBIC) 15 mg, oral, Daily    metoclopramide (REGLAN) 10 mg, oral, 2 times daily PRN    metoprolol tartrate (Lopressor) 25 mg tablet TAKE 1 TABLET BY MOUTH TWICE  DAILY    montelukast (SINGULAIR) 10 mg, oral, Nightly    omalizumab (Xolair) 150 mg injection subcutaneous, Every 28 days    ondansetron (ZOFRAN) 4 mg, oral, Every 8 hours PRN    ondansetron ODT (ZOFRAN-ODT) 4 mg, oral, Every 8 hours PRN    pantoprazole (PROTONIX) 40 mg, oral, Daily        Allergies   Allergen Reactions    Dexamethasone Bleeding    Formaldehyde Anaphylaxis    Levofloxacin Swelling     Depression    Cefepime Rash    Colchicine Swelling    Hydromorphone Nausea/vomiting    Indomethacin Swelling    Penicillins Hives    Sulfa (Sulfonamide Antibiotics) Hives and Other    Methotrexate Nausea Only    Other Unknown     Allergy: Cardiac Monitor Leads, Reaction: Unknown  Allergy: Fiberglass, Reaction: Moderate/Swelling  Allergy: Resins/Plastics, Reaction: Severe/Anaphylaxis    No Known Food Allergies    Ubrelvy [Ubrogepant] Unknown    Prochlorperazine  Unknown        Objective   Vitals:    03/27/24 1153   BP: 134/70   Pulse: 55   Resp: 16      Vitals:    03/27/24 1153   Weight: 111 kg (244 lb 9.6 oz)      Body mass index is 39.48 kg/m².   Physical Exam  Constitutional:       Appearance: Normal appearance. She is overweight.   HENT:      Head: Normocephalic and atraumatic.   Neck:      Thyroid: No thyroid mass, thyromegaly or thyroid tenderness.      Comments: Small thyroid gland  Cardiovascular:      Rate and Rhythm: Normal rate and regular rhythm.      Heart sounds: No murmur heard.     No gallop.   Pulmonary:      Effort: Pulmonary effort is normal.      Breath sounds: Normal breath sounds.   Abdominal:      Palpations: Abdomen is soft.      Comments: benign   Neurological:      General: No focal deficit present.      Mental Status: She is alert and oriented to person, place, and time.      Deep Tendon Reflexes: Reflexes are normal and symmetric.   Psychiatric:         Behavior: Behavior is cooperative.         Labs:  Lab Results   Component Value Date    HGBA1C 5.5 03/06/2024    TSH 1.61 03/06/2024    FREET4 1.46 06/07/2018      Lab Results   Component Value Date    THYROIDPAB <28 03/06/2024        Assessment/Plan   Problem List Items Addressed This Visit       Hypothyroidism - Primary     Discussed course.  Recent tsh normal at 1.6.     Continue current t4 dose  Will continue brand at her request  Follow up in one year    Electronically signed by:  Inge Humphries MD 03/27/24 12:48 PM

## 2024-04-06 ENCOUNTER — OFFICE VISIT (OUTPATIENT)
Dept: PRIMARY CARE | Facility: CLINIC | Age: 74
End: 2024-04-06
Payer: MEDICARE

## 2024-04-06 VITALS
SYSTOLIC BLOOD PRESSURE: 144 MMHG | BODY MASS INDEX: 39.7 KG/M2 | WEIGHT: 247 LBS | DIASTOLIC BLOOD PRESSURE: 78 MMHG | HEIGHT: 66 IN

## 2024-04-06 DIAGNOSIS — R60.9 EDEMA, UNSPECIFIED TYPE: ICD-10-CM

## 2024-04-06 DIAGNOSIS — L03.115 CELLULITIS OF RIGHT LOWER EXTREMITY: Primary | ICD-10-CM

## 2024-04-06 DIAGNOSIS — R11.0 DAILY NAUSEA: ICD-10-CM

## 2024-04-06 PROCEDURE — 3077F SYST BP >= 140 MM HG: CPT | Performed by: INTERNAL MEDICINE

## 2024-04-06 PROCEDURE — 1159F MED LIST DOCD IN RCRD: CPT | Performed by: INTERNAL MEDICINE

## 2024-04-06 PROCEDURE — 1160F RVW MEDS BY RX/DR IN RCRD: CPT | Performed by: INTERNAL MEDICINE

## 2024-04-06 PROCEDURE — 1036F TOBACCO NON-USER: CPT | Performed by: INTERNAL MEDICINE

## 2024-04-06 PROCEDURE — 3078F DIAST BP <80 MM HG: CPT | Performed by: INTERNAL MEDICINE

## 2024-04-06 PROCEDURE — 99213 OFFICE O/P EST LOW 20 MIN: CPT | Performed by: INTERNAL MEDICINE

## 2024-04-06 RX ORDER — CLOTRIMAZOLE 1 %
CREAM (GRAM) TOPICAL 2 TIMES DAILY
Qty: 113 G | Refills: 0 | Status: SHIPPED | OUTPATIENT
Start: 2024-04-06 | End: 2024-04-17 | Stop reason: ALTCHOICE

## 2024-04-06 RX ORDER — CLOTRIMAZOLE AND BETAMETHASONE DIPROPIONATE 10; .64 MG/G; MG/G
1 CREAM TOPICAL 2 TIMES DAILY
Qty: 15 G | Refills: 0 | Status: CANCELLED | OUTPATIENT
Start: 2024-04-06 | End: 2024-06-05

## 2024-04-06 RX ORDER — CEPHALEXIN 500 MG/1
500 CAPSULE ORAL 3 TIMES DAILY
Qty: 30 CAPSULE | Refills: 0 | Status: SHIPPED | OUTPATIENT
Start: 2024-04-06 | End: 2024-04-16

## 2024-04-06 RX ORDER — ONDANSETRON 4 MG/1
4 TABLET, FILM COATED ORAL EVERY 8 HOURS PRN
Qty: 60 TABLET | Refills: 0 | Status: SHIPPED | OUTPATIENT
Start: 2024-04-06 | End: 2024-05-21 | Stop reason: SDUPTHER

## 2024-04-06 NOTE — PROGRESS NOTES
"Subjective   Patient ID: Dixie Wilks is a 74 y.o. female who presents for Leg Swelling.    HPI   Patient is here with complaints of having swelling in the legs right more than left and now right leg has infection feels warm  Yesterday she gets anaphylactic shock and she takes prednisone off-and-on.  She says she gets anaphylactic reaction from chemicals her tongue feels swelled up when she gets choking sensation in the throat  She is getting diagnosed with ankylosing spondylitis recently  She also wants refill on Zofran because neurologist put her on Reglan for nausea did not work  She had vein ablation but recently her legs are swelling up  Review of Systems    Objective   /78   Ht 1.676 m (5' 6\")   Wt 112 kg (247 lb)   BMI 39.87 kg/m²     Physical Exam  Vitals reviewed.   Constitutional:       Appearance: Normal appearance.   HENT:      Head: Normocephalic and atraumatic.      Right Ear: Tympanic membrane, ear canal and external ear normal.      Left Ear: Tympanic membrane, ear canal and external ear normal.      Nose: Nose normal.      Mouth/Throat:      Pharynx: Oropharynx is clear.   Eyes:      Extraocular Movements: Extraocular movements intact.      Conjunctiva/sclera: Conjunctivae normal.      Pupils: Pupils are equal, round, and reactive to light.   Cardiovascular:      Rate and Rhythm: Normal rate and regular rhythm.      Pulses: Normal pulses.      Heart sounds: Normal heart sounds.   Pulmonary:      Effort: Pulmonary effort is normal.      Breath sounds: Normal breath sounds.   Abdominal:      General: Abdomen is flat. Bowel sounds are normal.      Palpations: Abdomen is soft.   Musculoskeletal:      Cervical back: Normal range of motion and neck supple.      Right lower leg: Edema present.      Left lower leg: Edema present.      Comments: Right leg more edema than left   Skin:     General: Skin is warm and dry.      Findings: Erythema and rash present.      Comments: Erythematous rash involving " the lower leg on the right   Neurological:      General: No focal deficit present.      Mental Status: She is alert and oriented to person, place, and time.   Psychiatric:         Mood and Affect: Mood normal.         Assessment/Plan   Problem List Items Addressed This Visit             ICD-10-CM       Infectious Diseases    Cellulitis - Primary L03.90       Symptoms and Signs    Edema R60.9    Relevant Medications    cephalexin (Keflex) 500 mg capsule    clotrimazole (Lotrimin) 1 % cream     Other Visit Diagnoses         Codes    Daily nausea     R11.0    Relevant Medications    ondansetron (Zofran) 4 mg tablet    clotrimazole (Lotrimin) 1 % cream          Treat with Keflex  Patient says she has taken Keflex without any side effects  Fungal cream treat with Lotrimin cream  Zofran as needed for nausea  Advised to keep legs elevated  I am wondering if her symptoms of swelling in the tongue is related to GERD rather than anaphylaxis as patient never gets swelling in the lips

## 2024-04-08 ENCOUNTER — HOSPITAL ENCOUNTER (OUTPATIENT)
Dept: RADIOLOGY | Facility: CLINIC | Age: 74
Discharge: HOME | End: 2024-04-08
Payer: MEDICARE

## 2024-04-08 DIAGNOSIS — N28.9 RENAL INSUFFICIENCY: ICD-10-CM

## 2024-04-08 PROCEDURE — 76770 US EXAM ABDO BACK WALL COMP: CPT

## 2024-04-08 PROCEDURE — 76770 US EXAM ABDO BACK WALL COMP: CPT | Performed by: RADIOLOGY

## 2024-04-17 ENCOUNTER — OFFICE VISIT (OUTPATIENT)
Dept: NEUROLOGY | Facility: CLINIC | Age: 74
End: 2024-04-17
Payer: MEDICARE

## 2024-04-17 VITALS
WEIGHT: 239 LBS | HEART RATE: 56 BPM | HEIGHT: 66 IN | BODY MASS INDEX: 38.41 KG/M2 | SYSTOLIC BLOOD PRESSURE: 130 MMHG | DIASTOLIC BLOOD PRESSURE: 76 MMHG

## 2024-04-17 DIAGNOSIS — M79.2 NEUROPATHIC PAIN: Primary | ICD-10-CM

## 2024-04-17 DIAGNOSIS — G43.019 COMMON MIGRAINE WITH INTRACTABLE MIGRAINE: ICD-10-CM

## 2024-04-17 PROCEDURE — 1159F MED LIST DOCD IN RCRD: CPT | Performed by: PSYCHIATRY & NEUROLOGY

## 2024-04-17 PROCEDURE — 3078F DIAST BP <80 MM HG: CPT | Performed by: PSYCHIATRY & NEUROLOGY

## 2024-04-17 PROCEDURE — 3075F SYST BP GE 130 - 139MM HG: CPT | Performed by: PSYCHIATRY & NEUROLOGY

## 2024-04-17 PROCEDURE — 99214 OFFICE O/P EST MOD 30 MIN: CPT | Performed by: PSYCHIATRY & NEUROLOGY

## 2024-04-17 PROCEDURE — 1160F RVW MEDS BY RX/DR IN RCRD: CPT | Performed by: PSYCHIATRY & NEUROLOGY

## 2024-04-17 PROCEDURE — 1036F TOBACCO NON-USER: CPT | Performed by: PSYCHIATRY & NEUROLOGY

## 2024-04-17 RX ORDER — GABAPENTIN 100 MG/1
200 CAPSULE ORAL 3 TIMES DAILY
Qty: 30 CAPSULE | Refills: 1 | Status: SHIPPED | OUTPATIENT
Start: 2024-04-17 | End: 2024-05-10

## 2024-04-17 NOTE — PROGRESS NOTES
Subjective   Dixie Wilks is a 74 y.o. right-handed female.  HPI  This is a 74 year old woman with a complicated medical history, last seen 7/17/2023.  She has primarily being treated for migraines.  Her migraines are different- no aura, come on suddenly, left side,  11-12 per month, can last 2-3 days.  She has been diagnosed with ankylosing spondylitis and psoriatic arthritis.  She has attacks of angioedema- for 23 years.  She is seeing Sadie Rubin MD for a 2nd opinion for rheumatologist.  She had 2 months of a sense of water running down her left leg- her chiropractor thought it was a nerve problem- resolved after 2 months.  She did not try Quilipta- concerned about interaction with her rheumatological medications.  Objective   Neurological Exam  Physical Exam  I personally reviewed laboratory, radiographic, and medical studies which were pertinent for nothing.    Assessment/Plan

## 2024-04-17 NOTE — PATIENT INSTRUCTIONS
You have multiple medical problems, which makes migraine treatment more difficult.  You should not take more than 20 Fioricet a month, as it can cause rebound migraines.    Taper off the gabapentin as instructed: take 200 mg three times a day for one month, then 100 mg three times a day  For one month, and then discontinue it.  Follow up in 6 months.

## 2024-05-10 DIAGNOSIS — M79.2 NEUROPATHIC PAIN: Primary | ICD-10-CM

## 2024-05-10 DIAGNOSIS — G43.009 MIGRAINE WITHOUT AURA AND WITHOUT STATUS MIGRAINOSUS, NOT INTRACTABLE: ICD-10-CM

## 2024-05-10 RX ORDER — BUTALBITAL, ACETAMINOPHEN AND CAFFEINE 50; 325; 40 MG/1; MG/1; MG/1
2 TABLET ORAL DAILY PRN
Qty: 20 TABLET | Refills: 0 | Status: SHIPPED | OUTPATIENT
Start: 2024-05-10

## 2024-05-10 RX ORDER — GABAPENTIN 300 MG/1
300 CAPSULE ORAL 3 TIMES DAILY
Qty: 30 CAPSULE | Refills: 2 | Status: SHIPPED | OUTPATIENT
Start: 2024-05-10 | End: 2025-05-10

## 2024-05-21 ENCOUNTER — APPOINTMENT (OUTPATIENT)
Dept: PRIMARY CARE | Facility: CLINIC | Age: 74
End: 2024-05-21
Payer: MEDICARE

## 2024-05-21 ENCOUNTER — LAB (OUTPATIENT)
Dept: LAB | Facility: LAB | Age: 74
End: 2024-05-21
Payer: MEDICARE

## 2024-05-21 ENCOUNTER — OFFICE VISIT (OUTPATIENT)
Dept: PRIMARY CARE | Facility: CLINIC | Age: 74
End: 2024-05-21
Payer: MEDICARE

## 2024-05-21 VITALS
HEIGHT: 66 IN | WEIGHT: 242 LBS | DIASTOLIC BLOOD PRESSURE: 76 MMHG | SYSTOLIC BLOOD PRESSURE: 130 MMHG | BODY MASS INDEX: 38.89 KG/M2

## 2024-05-21 DIAGNOSIS — R42 DIZZINESS: ICD-10-CM

## 2024-05-21 DIAGNOSIS — T78.40XA ALLERGY, INITIAL ENCOUNTER: ICD-10-CM

## 2024-05-21 DIAGNOSIS — I72.9 ANEURYSM (CMS-HCC): ICD-10-CM

## 2024-05-21 DIAGNOSIS — N18.9 CHRONIC RENAL FAILURE, UNSPECIFIED CKD STAGE: Primary | ICD-10-CM

## 2024-05-21 DIAGNOSIS — R11.0 DAILY NAUSEA: ICD-10-CM

## 2024-05-21 DIAGNOSIS — E78.00 HIGH CHOLESTEROL: ICD-10-CM

## 2024-05-21 DIAGNOSIS — I10 HYPERTENSION, UNSPECIFIED TYPE: ICD-10-CM

## 2024-05-21 LAB
ALBUMIN SERPL BCP-MCNC: 4 G/DL (ref 3.4–5)
ALP SERPL-CCNC: 61 U/L (ref 33–136)
ALT SERPL W P-5'-P-CCNC: 16 U/L (ref 7–45)
AMMONIA PLAS-SCNC: 33 UMOL/L (ref 16–53)
ANION GAP SERPL CALC-SCNC: 15 MMOL/L (ref 10–20)
AST SERPL W P-5'-P-CCNC: 21 U/L (ref 9–39)
BILIRUB SERPL-MCNC: 0.5 MG/DL (ref 0–1.2)
BUN SERPL-MCNC: 16 MG/DL (ref 6–23)
CALCIUM SERPL-MCNC: 9.3 MG/DL (ref 8.6–10.6)
CHLORIDE SERPL-SCNC: 101 MMOL/L (ref 98–107)
CO2 SERPL-SCNC: 28 MMOL/L (ref 21–32)
CREAT SERPL-MCNC: 1.24 MG/DL (ref 0.5–1.05)
EGFRCR SERPLBLD CKD-EPI 2021: 46 ML/MIN/1.73M*2
GLUCOSE SERPL-MCNC: 99 MG/DL (ref 74–99)
MAGNESIUM SERPL-MCNC: 1.92 MG/DL (ref 1.6–2.4)
POTASSIUM SERPL-SCNC: 4.5 MMOL/L (ref 3.5–5.3)
PROT SERPL-MCNC: 7.1 G/DL (ref 6.4–8.2)
SODIUM SERPL-SCNC: 139 MMOL/L (ref 136–145)

## 2024-05-21 PROCEDURE — 82140 ASSAY OF AMMONIA: CPT

## 2024-05-21 PROCEDURE — 80053 COMPREHEN METABOLIC PANEL: CPT

## 2024-05-21 PROCEDURE — 83735 ASSAY OF MAGNESIUM: CPT

## 2024-05-21 PROCEDURE — 1159F MED LIST DOCD IN RCRD: CPT | Performed by: INTERNAL MEDICINE

## 2024-05-21 PROCEDURE — 99214 OFFICE O/P EST MOD 30 MIN: CPT | Performed by: INTERNAL MEDICINE

## 2024-05-21 PROCEDURE — 36415 COLL VENOUS BLD VENIPUNCTURE: CPT

## 2024-05-21 PROCEDURE — 3078F DIAST BP <80 MM HG: CPT | Performed by: INTERNAL MEDICINE

## 2024-05-21 PROCEDURE — 1160F RVW MEDS BY RX/DR IN RCRD: CPT | Performed by: INTERNAL MEDICINE

## 2024-05-21 PROCEDURE — 3075F SYST BP GE 130 - 139MM HG: CPT | Performed by: INTERNAL MEDICINE

## 2024-05-21 RX ORDER — MECLIZINE HYDROCHLORIDE 25 MG/1
25 TABLET ORAL 3 TIMES DAILY
Qty: 30 TABLET | Refills: 3 | Status: SHIPPED | OUTPATIENT
Start: 2024-05-21 | End: 2024-05-21 | Stop reason: SDUPTHER

## 2024-05-21 RX ORDER — ONDANSETRON 4 MG/1
4 TABLET, FILM COATED ORAL EVERY 8 HOURS PRN
Qty: 60 TABLET | Refills: 0 | Status: SHIPPED | OUTPATIENT
Start: 2024-05-21 | End: 2024-06-08 | Stop reason: SDUPTHER

## 2024-05-21 RX ORDER — ALBUTEROL SULFATE 90 UG/1
2 AEROSOL, METERED RESPIRATORY (INHALATION)
Qty: 18 G | Refills: 3 | Status: SHIPPED | OUTPATIENT
Start: 2024-05-21

## 2024-05-21 RX ORDER — SECUKINUMAB 150 MG/ML
300 INJECTION SUBCUTANEOUS
COMMUNITY

## 2024-05-21 RX ORDER — MECLIZINE HYDROCHLORIDE 25 MG/1
25 TABLET ORAL 3 TIMES DAILY
Qty: 30 TABLET | Refills: 3 | Status: SHIPPED | OUTPATIENT
Start: 2024-05-21 | End: 2024-06-08 | Stop reason: SDUPTHER

## 2024-05-21 ASSESSMENT — ENCOUNTER SYMPTOMS
LOSS OF SENSATION IN FEET: 0
OCCASIONAL FEELINGS OF UNSTEADINESS: 0
DEPRESSION: 0

## 2024-05-22 NOTE — PROGRESS NOTES
"Subjective   Patient ID: Dixie Wilks is a 74 y.o. female who presents for Follow-up (Multiple medical issues).    Ms. Gretta Wilks today came here for multiple medical issues.  1. She is not feeling good.  For last four weeks, she started taking new drug, Cosentyx for psoriatic arthritis and ankylosing spondylitis by Dr. Rubin.  She thinks she might be getting side effects.  She is somewhat dizzy, tending to feel left, it is an old problem.  She has seen [facility] before.  2. She is also worried about her kidney function, which is borderline, which I doubt causing problem.  She came for follow-up on various conditions and polypharmacy review.  3. Dizziness, not true vertigo.  She can eat and drink nauseated, but can keep the food down.    I have personally reviewed the patient's Past Medical History, Medications, Allergies, Social History, and Family History in the EMR.    Review of Systems   All other systems reviewed and are negative.    Objective   /76   Ht 1.676 m (5' 6\")   Wt 110 kg (242 lb)   BMI 39.06 kg/m²     Physical Exam  Vitals reviewed.   Eyes:      Comments: Nystagmus left beating, stage 1 to 2.   Cardiovascular:      Heart sounds: Normal heart sounds, S1 normal and S2 normal. No murmur heard.     No friction rub.   Pulmonary:      Effort: Pulmonary effort is normal.      Breath sounds: Normal breath sounds and air entry.   Abdominal:      Palpations: There is no hepatomegaly, splenomegaly or mass.   Musculoskeletal:      Right lower leg: No edema.      Left lower leg: No edema.   Lymphadenopathy:      Lower Body: No right inguinal adenopathy. No left inguinal adenopathy.   Neurological:      Cranial Nerves: Cranial nerves 2-12 are intact.      Sensory: No sensory deficit.      Motor: Motor function is intact.      Deep Tendon Reflexes: Reflexes are normal and symmetric.      Comments: Romberg's mildly positive.     LAB WORK: Laboratory testing discussed.    Assessment/Plan   Problem " List Items Addressed This Visit             ICD-10-CM       Allergies and Adverse Reactions    Allergy T78.40XA    Relevant Medications    albuterol 90 mcg/actuation inhaler    Other Relevant Orders    Comprehensive metabolic panel (Completed)    Magnesium (Completed)    Ammonia (Completed)       Cardiac and Vasculature    Hypertension I10       Symptoms and Signs    Dizziness R42    Relevant Orders    MR brain wo IV contrast    Comprehensive metabolic panel (Completed)    Magnesium (Completed)    Ammonia (Completed)     Other Visit Diagnoses         Codes    Chronic renal failure, unspecified CKD stage    -  Primary N18.9    Daily nausea     R11.0    Relevant Medications    ondansetron (Zofran) 4 mg tablet    meclizine (Antivert) 25 mg tablet    Other Relevant Orders    Comprehensive metabolic panel (Completed)    Magnesium (Completed)    Ammonia (Completed)    Aneurysm (CMS-HCC)     I72.9    Relevant Orders    MR angio head wo IV contrast    Comprehensive metabolic panel (Completed)    Magnesium (Completed)    Ammonia (Completed)    High cholesterol     E78.00        1. Dizziness, I think it is inner ear issue.  I doubt it is a drug side effet.  I checked the drug interaction.  I doubt anything is significant.  None of her drug going to the liver P450 system.  We discussed.  I gave her some Antivert.  2. ______ probably inner ear issue.  She used to see [facility].  I ordered brain MRI without contrast.  She has no follow-up for three years.  She was due last year, she did not.  3. Angioma versus aneurysm.  Needs follow-up.  I ordered MRA without contrast to protect her from dye at the moment.  4. Chronic renal failure.  Monitor kidney.  5. Hypertension, okay.  6. Cholesterol.  Monitor.  7. Blood work ordered.  8. I plan to see her in about a week’s time after this workup.  I will share my data with [facility] in order for inner ear issue.  9. I will collaborate with Dr. Sadie Rubin,  rheumatologist.  10. Interviewing the patient at great length looks like she is benefiting from Cosentyx.  I will continue with this drug at the moment, but I will keep with the same dose till this thing is resolved.  I will not go up on the dose at the moment.    Scribe Attestation  By signing my name below, I, Agusto Gomes attest that this documentation has been prepared under the direction and in the presence of Yarelis Blanco MD.

## 2024-05-24 ENCOUNTER — OFFICE VISIT (OUTPATIENT)
Dept: PRIMARY CARE | Facility: CLINIC | Age: 74
End: 2024-05-24
Payer: MEDICARE

## 2024-05-24 VITALS
SYSTOLIC BLOOD PRESSURE: 128 MMHG | DIASTOLIC BLOOD PRESSURE: 68 MMHG | HEIGHT: 66 IN | WEIGHT: 247 LBS | BODY MASS INDEX: 39.7 KG/M2

## 2024-05-24 DIAGNOSIS — M45.9 ANKYLOSING SPONDYLITIS, UNSPECIFIED SITE OF SPINE (MULTI): ICD-10-CM

## 2024-05-24 DIAGNOSIS — R05.9 COUGH, UNSPECIFIED TYPE: ICD-10-CM

## 2024-05-24 DIAGNOSIS — J45.909 ACUTE ASTHMATIC BRONCHITIS (HHS-HCC): Primary | ICD-10-CM

## 2024-05-24 DIAGNOSIS — N28.9 RENAL INSUFFICIENCY: ICD-10-CM

## 2024-05-24 PROCEDURE — 3074F SYST BP LT 130 MM HG: CPT | Performed by: INTERNAL MEDICINE

## 2024-05-24 PROCEDURE — 99214 OFFICE O/P EST MOD 30 MIN: CPT | Performed by: INTERNAL MEDICINE

## 2024-05-24 PROCEDURE — 3078F DIAST BP <80 MM HG: CPT | Performed by: INTERNAL MEDICINE

## 2024-05-24 PROCEDURE — 1160F RVW MEDS BY RX/DR IN RCRD: CPT | Performed by: INTERNAL MEDICINE

## 2024-05-24 PROCEDURE — 1159F MED LIST DOCD IN RCRD: CPT | Performed by: INTERNAL MEDICINE

## 2024-05-24 RX ORDER — PREDNISONE 10 MG/1
TABLET ORAL 3 TIMES DAILY
Qty: 18 TABLET | Refills: 0 | Status: SHIPPED | OUTPATIENT
Start: 2024-05-24 | End: 2024-06-01

## 2024-05-24 RX ORDER — LORATADINE 10 MG/1
10 TABLET ORAL DAILY
Qty: 30 TABLET | Refills: 2 | Status: SHIPPED | OUTPATIENT
Start: 2024-05-24 | End: 2024-08-22

## 2024-05-24 RX ORDER — DOXYCYCLINE 100 MG/1
100 CAPSULE ORAL 2 TIMES DAILY
Qty: 20 CAPSULE | Refills: 0 | Status: SHIPPED | OUTPATIENT
Start: 2024-05-24 | End: 2024-06-03

## 2024-05-24 ASSESSMENT — ENCOUNTER SYMPTOMS
OCCASIONAL FEELINGS OF UNSTEADINESS: 0
LOSS OF SENSATION IN FEET: 0
DEPRESSION: 0

## 2024-05-24 NOTE — PROGRESS NOTES
"Subjective   Patient ID: Dixie Wilks is a 74 y.o. female who presents for multiple medical issues..    Ms. Glynn today came here for multiple medical issues.  1. Cough, yellow sputum, sinus congestion, bronchitis, not feeling good.  2. Follow-up on blood work.  She is started this new injectable drug for ankylosing spondylitis, they were concerned with side effects.  She is working with Dr. Nereyda Rubin on it.  3. She is also concerned whether she can take steroid or not.    I have personally reviewed the patient's Past Medical History, Medications, Allergies, Social History, and Family History in the EMR.    Review of Systems   All other systems reviewed and are negative.    Objective   /68   Ht 1.676 m (5' 6\")   Wt 112 kg (247 lb)   BMI 39.87 kg/m²     Physical Exam  Vitals reviewed.   HENT:      Nose: Congestion present.      Comments: Postnasal drip.     Mouth/Throat:      Comments: Throat congested.  Cardiovascular:      Heart sounds: Normal heart sounds, S1 normal and S2 normal. No murmur heard.     No friction rub.   Pulmonary:      Effort: Pulmonary effort is normal.      Breath sounds: Wheezing (Bilateral) present.   Abdominal:      Palpations: There is no hepatomegaly, splenomegaly or mass.   Musculoskeletal:      Right lower leg: No edema.      Left lower leg: No edema.   Lymphadenopathy:      Lower Body: No right inguinal adenopathy. No left inguinal adenopathy.   Neurological:      Cranial Nerves: Cranial nerves 2-12 are intact.      Sensory: No sensory deficit.      Motor: Motor function is intact.      Deep Tendon Reflexes: Reflexes are normal and symmetric.     LAB WORK:  Laboratory testing discussed.    Assessment/Plan   Problem List Items Addressed This Visit             ICD-10-CM       Genitourinary and Reproductive    Renal insufficiency N28.9     Other Visit Diagnoses         Codes    Acute asthmatic bronchitis ([facility])    -  Primary J45.909    Cough, unspecified type     R05.9    " Relevant Medications    predniSONE (Deltasone) 10 mg tablet    doxycycline (Vibramycin) 100 mg capsule    loratadine (Claritin) 10 mg tablet    Ankylosing spondylitis, unspecified site of spine ([facility])     M45.9        1. Acute asthmatic bronchitis and cough.  Doxycycline, Claritin, and Robitussin.  2. Question of prednisone.  I will work with Dr. Rubin.  3. Renal insufficiency, borderline.  I will keep an eye.  4. Ankylosing spondylitis, on new drug treatment.  Course extended.  I think she is really safe to continue with the drug and I will monitor it with Dr. Rubin.  Reassured.    Scribe Attestation  By signing my name below, I, Agusto Koo attest that this documentation has been prepared under the direction and in the presence of Yarelis Blanco MD.

## 2024-06-08 DIAGNOSIS — R11.0 DAILY NAUSEA: ICD-10-CM

## 2024-06-08 RX ORDER — MECLIZINE HYDROCHLORIDE 25 MG/1
25 TABLET ORAL 3 TIMES DAILY
Qty: 30 TABLET | Refills: 3 | Status: SHIPPED | OUTPATIENT
Start: 2024-06-08

## 2024-06-08 RX ORDER — ONDANSETRON 4 MG/1
4 TABLET, FILM COATED ORAL EVERY 8 HOURS PRN
Qty: 60 TABLET | Refills: 0 | Status: SHIPPED | OUTPATIENT
Start: 2024-06-08

## 2024-06-17 ENCOUNTER — HOSPITAL ENCOUNTER (OUTPATIENT)
Dept: RADIOLOGY | Facility: HOSPITAL | Age: 74
Discharge: HOME | End: 2024-06-17
Payer: MEDICARE

## 2024-06-17 DIAGNOSIS — I72.9 ANEURYSM (CMS-HCC): ICD-10-CM

## 2024-06-17 DIAGNOSIS — R42 DIZZINESS: ICD-10-CM

## 2024-06-17 PROCEDURE — 70551 MRI BRAIN STEM W/O DYE: CPT

## 2024-06-17 PROCEDURE — 70544 MR ANGIOGRAPHY HEAD W/O DYE: CPT | Mod: 59

## 2024-06-17 PROCEDURE — 70551 MRI BRAIN STEM W/O DYE: CPT | Performed by: RADIOLOGY

## 2024-06-21 ENCOUNTER — APPOINTMENT (OUTPATIENT)
Dept: PRIMARY CARE | Facility: CLINIC | Age: 74
End: 2024-06-21
Payer: MEDICARE

## 2024-06-24 ENCOUNTER — HOSPITAL ENCOUNTER (OUTPATIENT)
Dept: RADIOLOGY | Facility: HOSPITAL | Age: 74
Discharge: HOME | End: 2024-06-24
Payer: MEDICARE

## 2024-06-24 ENCOUNTER — APPOINTMENT (OUTPATIENT)
Dept: ORTHOPEDIC SURGERY | Facility: CLINIC | Age: 74
End: 2024-06-24
Payer: MEDICARE

## 2024-06-24 DIAGNOSIS — M79.642 BILATERAL HAND PAIN: ICD-10-CM

## 2024-06-24 DIAGNOSIS — M79.641 BILATERAL HAND PAIN: ICD-10-CM

## 2024-06-24 DIAGNOSIS — M19.049 HAND ARTHRITIS: Primary | ICD-10-CM

## 2024-06-24 PROCEDURE — 1125F AMNT PAIN NOTED PAIN PRSNT: CPT | Performed by: ORTHOPAEDIC SURGERY

## 2024-06-24 PROCEDURE — 1159F MED LIST DOCD IN RCRD: CPT | Performed by: ORTHOPAEDIC SURGERY

## 2024-06-24 PROCEDURE — 1036F TOBACCO NON-USER: CPT | Performed by: ORTHOPAEDIC SURGERY

## 2024-06-24 PROCEDURE — 20600 DRAIN/INJ JOINT/BURSA W/O US: CPT | Performed by: ORTHOPAEDIC SURGERY

## 2024-06-24 PROCEDURE — 73130 X-RAY EXAM OF HAND: CPT | Mod: BILATERAL PROCEDURE | Performed by: RADIOLOGY

## 2024-06-24 PROCEDURE — 73130 X-RAY EXAM OF HAND: CPT | Mod: 50

## 2024-06-24 PROCEDURE — 99214 OFFICE O/P EST MOD 30 MIN: CPT | Performed by: ORTHOPAEDIC SURGERY

## 2024-06-24 RX ORDER — LIDOCAINE HYDROCHLORIDE 10 MG/ML
0.25 INJECTION INFILTRATION; PERINEURAL
Status: COMPLETED | OUTPATIENT
Start: 2024-06-24 | End: 2024-06-24

## 2024-06-24 RX ORDER — TRIAMCINOLONE ACETONIDE 40 MG/ML
10 INJECTION, SUSPENSION INTRA-ARTICULAR; INTRAMUSCULAR
Status: COMPLETED | OUTPATIENT
Start: 2024-06-24 | End: 2024-06-24

## 2024-06-24 ASSESSMENT — PAIN SCALES - GENERAL: PAINLEVEL_OUTOF10: 7

## 2024-06-24 ASSESSMENT — PAIN - FUNCTIONAL ASSESSMENT: PAIN_FUNCTIONAL_ASSESSMENT: 0-10

## 2024-06-25 NOTE — PROGRESS NOTES
History of Present Illness:  Chief Complaint   Patient presents with    Left Hand - Pain    Right Hand - Pain       74-year-old female with known hand arthritis affecting multiple digits presents for evaluation.  She reports that she is currently primarily symptomatic into right ring finger DIP joint and right index finger PIP joint.  Symptoms were somewhat aggravated when the top of her washer lid fell onto both of her hands.  No change in range of motion after that incident.  No numbness or tingling.  She has increased pain with weightbearing as well as grasping.  Decreasing  strength secondary to the pain she is having in her hands.    Past Medical History:   Diagnosis Date    Abnormal findings on diagnostic imaging of skull and head, not elsewhere classified 11/26/2019    Abnormal MRI of head    Angioedema     Arthritis     Body mass index (BMI) 38.0-38.9, adult     BMI 38.0-38.9,adult    Headache, unspecified 09/18/2019    Headache    High cholesterol     History of angioedema     Hypertension     Hypothyroidism     Low back pain, unspecified 06/25/2015    Acute low back pain due to trauma    Migraine with aura, not intractable, without status migrainosus 12/12/2022    Migraine with aura and without status migrainosus, not intractable    Nausea 10/22/2015    Nausea    Neuropathic pain     Obesity     Pain in unspecified hip 08/22/2016    Hip pain    Personal history of other diseases of the digestive system 08/14/2013    History of gastroenteritis    Personal history of other diseases of the respiratory system 11/17/2014    History of acute sinusitis    Personal history of other diseases of the respiratory system 07/27/2015    History of acute bronchitis    Personal history of other diseases of urinary system 10/10/2013    History of hematuria    Personal history of other infectious and parasitic diseases 09/30/2015    History of athlete's foot    Personal history of other infectious and parasitic diseases  11/04/2015    History of candidiasis of mouth    Personal history of other specified conditions 11/30/2016    History of abdominal pain    Personal history of other specified conditions 02/06/2014    History of insomnia    Personal history of other specified conditions 10/07/2013    History of abdominal pain    Personal history of other specified conditions 10/10/2013    History of urinary frequency    Personal history of other specified conditions 10/22/2013    History of dizziness    Personal history of other specified conditions 10/22/2013    History of syncope    Postmenopausal        Medication Documentation Review Audit       Reviewed by Hannah Waite CMA (Medical Assistant) on 06/24/24 at 1015      Medication Order Taking? Sig Documenting Provider Last Dose Status   albuterol 90 mcg/actuation inhaler [id number]  Inhale 2 puffs 3 times a day. Yarelis Blanco MD  Active   amLODIPine (Norvasc) 5 mg tablet [id number] No Take 1 tablet (5 mg) by mouth once daily. Felix Gibson MD Taking Active   butalbital-acetaminophen-caff -40 mg tablet [id number]  Take 2 tablets by mouth once daily as needed for headaches or migraine. Magdy Anderson MD  Active   diphenhydrAMINE (Benadryl Allergy) 25 mg tablet [id number] No Take 1 tablet (25 mg) by mouth early in the morning.. Do not start before October 30, 2023. Yarelis Blanco MD Taking Active   EPINEPHrine 0.3 mg/0.3 mL injection syringe [id number] No Inject 0.3 mL (0.3 mg) into the muscle if needed for anaphylaxis. Inject into upper leg. Call 911 after use. Yarelis Blanco MD Taking Active   ergocalciferol (Vitamin D-2) 1.25 MG (86234 UT) capsule [id number] No Take 1 capsule (1,250 mcg) by mouth 1 (one) time per week. Do not start before November 2, 2023. Yarelis Blanco MD Taking Active   fluticasone furoate-vilanteroL (Breo Ellipta) 200-25 mcg/dose inhaler [id number] No Inhale 1 puff once daily. Yarelis Blanco MD Taking Active   gabapentin (Neurontin) 300 mg  capsule [id number]  Take 1 capsule (300 mg) by mouth 3 times a day. Magdy Anderson MD  Active   ipratropium-albuteroL (Duo-Neb) 0.5-2.5 mg/3 mL nebulizer solution [id number] No Take 3 mL by nebulization 3 times a day as needed for wheezing. Yarelis Blanco MD Taking Active   loratadine (Claritin) 10 mg tablet [id number]  Take 1 tablet (10 mg) by mouth once daily. Yarelis Blanco MD  Active   magnesium oxide (Mag-Ox) 400 mg (241.3 mg magnesium) tablet [id number] No Take 1 tablet (400 mg) by mouth once daily. Yarelis Blanco MD Taking Active   meclizine (Antivert) 25 mg tablet [id number]  Take 1 tablet (25 mg) by mouth 3 times a day. Yarelis Blanco MD  Active   metoprolol tartrate (Lopressor) 25 mg tablet [id number] No TAKE 1 TABLET BY MOUTH TWICE  DAILY Yarelis Blanco MD Taking Active   montelukast (Singulair) 10 mg tablet [id number] No Take 1 tablet (10 mg) by mouth once daily at bedtime. Yarelis Blanco MD Taking  24 2359   omalizumab (Xolair) 150 mg injection [id number] No Inject under the skin every 28 (twenty-eight) days. Adelita Pretty MD Taking Active   ondansetron (Zofran) 4 mg tablet [id number]  Take 1 tablet (4 mg) by mouth every 8 hours if needed for nausea or vomiting. Yarelis Blanco MD  Active   pantoprazole (ProtoNix) 40 mg EC tablet [id number] No Take 1 tablet (40 mg) by mouth once daily. Yarelis Blanco MD Taking Active   secukinumab (Cosentyx Pen, 2 Pens,) 150 mg/mL self-injector pen [id number]  Inject 2 mL (300 mg) under the skin 1 (one) time per week. Adelita Pretty MD  Active   Synthroid 125 mcg tablet [id number] No Take 1 tablet (125 mcg) by mouth once daily. Inge Humphries MD Taking Active                    Allergies   Allergen Reactions    Dexamethasone Bleeding    Formaldehyde Anaphylaxis    Levofloxacin Swelling     Depression    Cefepime Rash    Colchicine Swelling    Hydromorphone Nausea/vomiting    Indomethacin Swelling    Penicillins Hives    Sulfa (Sulfonamide  Antibiotics) Hives and Other    Methotrexate Nausea Only    Other Unknown     Allergy: Cardiac Monitor Leads, Reaction: Unknown  Allergy: Fiberglass, Reaction: Moderate/Swelling  Allergy: Resins/Plastics, Reaction: Severe/Anaphylaxis    No Known Food Allergies    Ubrelvy [Ubrogepant] Unknown    Prochlorperazine Unknown       Social History     Socioeconomic History    Marital status:      Spouse name: Not on file    Number of children: Not on file    Years of education: Not on file    Highest education level: Not on file   Occupational History    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   Tobacco Use    Smoking status: Never     Passive exposure: Never    Smokeless tobacco: Never   Substance and Sexual Activity    Alcohol use: Never    Drug use: Never    Sexual activity: Defer   Other Topics Concern    Not on file   Social History Narrative    Lives with her .     Social Determinants of Health     Financial Resource Strain: Medium Risk (10/27/2023)    Overall Financial Resource Strain (CARDIA)     Difficulty of Paying Living Expenses: Somewhat hard   Food Insecurity: Not on file   Transportation Needs: No Transportation Needs (10/27/2023)    PRAPARE - Transportation     Lack of Transportation (Medical): No     Lack of Transportation (Non-Medical): No   Physical Activity: Not on file   Stress: Not on file   Social Connections: Not on file   Intimate Partner Violence: Not on file   Housing Stability: Low Risk  (10/27/2023)    Housing Stability Vital Sign     Unable to Pay for Housing in the Last Year: No     Number of Places Lived in the Last Year: 1     Unstable Housing in the Last Year: No       Past Surgical History:   Procedure Laterality Date    KNEE SURGERY  09/23/2013    Knee Surgery    MR HEAD ANGIO WO IV CONTRAST  9/8/2021    MR HEAD ANGIO WO IV CONTRAST [facility]    OTHER SURGICAL HISTORY  09/23/2013    Biopsy Tongue    OTHER SURGICAL HISTORY  09/23/2013    Gynecologic Laparoscopy With  Adhesiolysis    TONSILLECTOMY  02/09/2015    Tonsillectomy        Review of Systems   GENERAL: Negative for malaise, significant weight loss, fever  MUSCULOSKELETAL: see HPI  NEURO:  Negative     Physical Examination  Constitutional: Appears well-developed and well-nourished.  Head: Normocephalic and atraumatic.  Eyes: EOMI grossly  Cardiovascular: Intact distal pulses.   Respiratory: Effort normal. No respiratory distress.  Neurologic: Alert and oriented to person, place, and time.  Skin: Skin is warm and dry.  Hematologic / Lymphatic: No lymphedema, lymphangitis.  Psychiatric: normal mood and affect. Behavior is normal.   Musculoskeletal:  Bilateral hands: Degenerative changes noted about multiple IP joints as well as thumb CMC region.  Primary tenderness about right ring finger DIP joint and right index finger PIP joint.  1 to 2.5 cm DPC.  EPL/FPL intact.  Sensation grossly intact to light touch throughout.  Capillary refill less than 2 seconds.    Radiographs: Bilateral hand radiographs ordered and available for my review/interpretation demonstrate thumb CMC degenerative changes as well as multiple IP joints with degenerative changes.  No fracture/dislocation.     Assessment:  Patient with bilateral hand arthritis with right index PIP joint with symptomatic     Plan:  Nature of the diagnosis was discussed with the patient.  We discussed various treatment options including anti-inflammatories, topical and oral.  We also discussed splinting, activity modifications, corticosteroid injections as well as role of potential surgical intervention with fusion versus joint replacement.  At this time patient would like to attempt right index finger corticosteroid injection with the understanding that this will not get rid of the arthritis, but may provide her with some symptom relief.  I am also messaging her allergist with regards to her allergy list to see if Silastic joint replacement is a potential option for the  "future.        Patient ID: Gretta Wilks \"Dixie\" is a 74 y.o. female.    Hand / UE Inj/Asp: R index PIP for osteoarthritis on 6/24/2024 9:43 PM  Indications: pain  Details: 25 G needle, dorsal approach  Medications: 10 mg triamcinolone acetonide 40 mg/mL; 0.25 mL lidocaine 10 mg/mL (1 %)  Outcome: tolerated well, no immediate complications  Procedure, treatment alternatives, risks and benefits explained, specific risks discussed. Consent was given by the patient.            "

## 2024-07-15 ENCOUNTER — OFFICE VISIT (OUTPATIENT)
Dept: PRIMARY CARE | Facility: CLINIC | Age: 74
End: 2024-07-15
Payer: MEDICARE

## 2024-07-15 ENCOUNTER — APPOINTMENT (OUTPATIENT)
Dept: PRIMARY CARE | Facility: CLINIC | Age: 74
End: 2024-07-15
Payer: MEDICARE

## 2024-07-15 VITALS
WEIGHT: 235 LBS | HEIGHT: 65 IN | SYSTOLIC BLOOD PRESSURE: 128 MMHG | DIASTOLIC BLOOD PRESSURE: 72 MMHG | BODY MASS INDEX: 39.15 KG/M2

## 2024-07-15 DIAGNOSIS — N18.9 CHRONIC RENAL FAILURE, UNSPECIFIED CKD STAGE: ICD-10-CM

## 2024-07-15 DIAGNOSIS — I10 HYPERTENSION, UNSPECIFIED TYPE: ICD-10-CM

## 2024-07-15 DIAGNOSIS — M25.569 KNEE PAIN, UNSPECIFIED CHRONICITY, UNSPECIFIED LATERALITY: Primary | ICD-10-CM

## 2024-07-15 DIAGNOSIS — E03.9 HYPOTHYROIDISM, UNSPECIFIED TYPE: ICD-10-CM

## 2024-07-15 DIAGNOSIS — Z12.31 SCREENING MAMMOGRAM, ENCOUNTER FOR: ICD-10-CM

## 2024-07-15 DIAGNOSIS — E78.00 HIGH CHOLESTEROL: ICD-10-CM

## 2024-07-15 DIAGNOSIS — R73.03 PRE-DIABETES: ICD-10-CM

## 2024-07-15 PROBLEM — N32.81 OVERACTIVE BLADDER: Status: ACTIVE | Noted: 2024-07-15

## 2024-07-15 PROBLEM — N39.0 URINARY TRACT INFECTION: Status: ACTIVE | Noted: 2024-07-15

## 2024-07-15 PROBLEM — R50.9 FEVER: Status: ACTIVE | Noted: 2024-07-15

## 2024-07-15 PROBLEM — M25.512 PAIN OF LEFT SHOULDER REGION: Status: ACTIVE | Noted: 2023-03-29

## 2024-07-15 PROBLEM — H66.90 INFECTION OF EAR: Status: ACTIVE | Noted: 2024-07-15

## 2024-07-15 PROBLEM — R05.9 COUGH: Status: ACTIVE | Noted: 2024-07-15

## 2024-07-15 PROBLEM — M79.673 PAIN OF FOOT: Status: ACTIVE | Noted: 2024-07-15

## 2024-07-15 PROBLEM — M79.604 PAIN OF RIGHT LOWER EXTREMITY: Status: ACTIVE | Noted: 2024-07-15

## 2024-07-15 PROBLEM — B37.9 YEAST INFECTION: Status: RESOLVED | Noted: 2023-06-19 | Resolved: 2024-07-15

## 2024-07-15 PROBLEM — M79.603 PAIN OF UPPER EXTREMITY: Status: ACTIVE | Noted: 2024-07-15

## 2024-07-15 PROBLEM — B99.9: Status: ACTIVE | Noted: 2024-07-15

## 2024-07-15 PROBLEM — R51.9 HEADACHE: Status: ACTIVE | Noted: 2024-07-15

## 2024-07-15 PROBLEM — R23.2 FLUSHING: Status: ACTIVE | Noted: 2024-07-15

## 2024-07-15 PROCEDURE — 1124F ACP DISCUSS-NO DSCNMKR DOCD: CPT | Performed by: INTERNAL MEDICINE

## 2024-07-15 PROCEDURE — 99213 OFFICE O/P EST LOW 20 MIN: CPT | Performed by: INTERNAL MEDICINE

## 2024-07-15 PROCEDURE — 3078F DIAST BP <80 MM HG: CPT | Performed by: INTERNAL MEDICINE

## 2024-07-15 PROCEDURE — 3074F SYST BP LT 130 MM HG: CPT | Performed by: INTERNAL MEDICINE

## 2024-07-15 PROCEDURE — 3008F BODY MASS INDEX DOCD: CPT | Performed by: INTERNAL MEDICINE

## 2024-07-15 PROCEDURE — 1159F MED LIST DOCD IN RCRD: CPT | Performed by: INTERNAL MEDICINE

## 2024-07-15 ASSESSMENT — ENCOUNTER SYMPTOMS
LOSS OF SENSATION IN FEET: 0
OCCASIONAL FEELINGS OF UNSTEADINESS: 0
DEPRESSION: 0

## 2024-07-16 DIAGNOSIS — B37.0 ORAL THRUSH: ICD-10-CM

## 2024-07-16 RX ORDER — NYSTATIN 100000 [USP'U]/ML
500000 SUSPENSION ORAL 4 TIMES DAILY
Qty: 280 ML | Refills: 0 | Status: SHIPPED | OUTPATIENT
Start: 2024-07-16

## 2024-07-16 NOTE — PROGRESS NOTES
"Subjective   Patient ID: Dixie Wilks is a 74 y.o. female who presents for multiple medical issues..    Gretta Wilks today came here for multiple medical issues.  She is very happy with her physical therapy reports.  She came for follow-up on various conditions.  She wants a mammogram.  Appetite and weight are okay.  No problem.    I have personally reviewed the patient's Past Medical History, Medications, Allergies, Social History, and Family History in the EMR.    Review of Systems   All other systems reviewed and are negative.    Objective   /72   Ht 1.651 m (5' 5\")   Wt 107 kg (235 lb)   BMI 39.11 kg/m²     Physical Exam  Vitals reviewed.   Cardiovascular:      Heart sounds: Normal heart sounds, S1 normal and S2 normal. No murmur heard.     No friction rub.   Pulmonary:      Effort: Pulmonary effort is normal.      Breath sounds: Normal breath sounds and air entry.   Abdominal:      Palpations: There is no hepatomegaly, splenomegaly or mass.   Musculoskeletal:      Right lower leg: No edema.      Left lower leg: No edema.   Lymphadenopathy:      Lower Body: No right inguinal adenopathy. No left inguinal adenopathy.   Neurological:      Cranial Nerves: Cranial nerves 2-12 are intact.      Sensory: No sensory deficit.      Motor: Motor function is intact.      Deep Tendon Reflexes: Reflexes are normal and symmetric.     LAB WORK: Laboratory testing discussed.    Assessment/Plan   Problem List Items Addressed This Visit             ICD-10-CM       Cardiac and Vasculature    Hypertension I10    Relevant Orders    Urinalysis with Reflex Microscopic    CBC    Comprehensive Metabolic Panel    Thyroid Stimulating Hormone    Lipid Panel Non-Fasting    Vitamin D 25-Hydroxy,Total (for eval of Vitamin D levels)       Endocrine/Metabolic    Hypothyroidism E03.9    Relevant Orders    Urinalysis with Reflex Microscopic    CBC    Comprehensive Metabolic Panel    Thyroid Stimulating Hormone    Lipid Panel Non-Fasting    " Vitamin D 25-Hydroxy,Total (for eval of Vitamin D levels)     Other Visit Diagnoses         Codes    Knee pain, unspecified chronicity, unspecified laterality    -  Primary M25.569    High cholesterol     E78.00    Relevant Orders    Urinalysis with Reflex Microscopic    CBC    Comprehensive Metabolic Panel    Thyroid Stimulating Hormone    Lipid Panel Non-Fasting    Vitamin D 25-Hydroxy,Total (for eval of Vitamin D levels)    Pre-diabetes     R73.03    Relevant Orders    Urinalysis with Reflex Microscopic    CBC    Comprehensive Metabolic Panel    Thyroid Stimulating Hormone    Lipid Panel Non-Fasting    Vitamin D 25-Hydroxy,Total (for eval of Vitamin D levels)    Body mass index (BMI) 39.0-39.9, adult     Z68.39    Relevant Orders    Vitamin D 25-Hydroxy,Total (for eval of Vitamin D levels)    Screening mammogram, encounter for     Z12.31    Relevant Orders    BI mammo bilateral screening tomosynthesis    Chronic renal failure, unspecified CKD stage     N18.9        1. Knee pain, going for therapy.  2. Abnormal ______ ordered.  3. Chronic renal failure.  Monitor kidney.  4. Hypertension, okay.  5. Cholesterol, stable.  6. Gynecology.  Her gynecologist retired, so I urged her to see somebody from same group, otherwise Josemanuel Thrasher.  7. Routine blood work ordered.  8. Follow-up in six weeks.  9. Continue to follow.    Scribe Attestation  By signing my name below, ILeidy Scribe attest that this documentation has been prepared under the direction and in the presence of Yarelis Blanco MD.

## 2024-07-26 RX ORDER — DIAZEPAM 5 MG/1
TABLET ORAL
COMMUNITY
Start: 2024-07-18

## 2024-07-29 ENCOUNTER — OFFICE VISIT (OUTPATIENT)
Dept: CARDIOLOGY | Facility: CLINIC | Age: 74
End: 2024-07-29
Payer: MEDICARE

## 2024-07-29 VITALS
HEART RATE: 60 BPM | SYSTOLIC BLOOD PRESSURE: 150 MMHG | HEIGHT: 65 IN | OXYGEN SATURATION: 95 % | BODY MASS INDEX: 40.32 KG/M2 | DIASTOLIC BLOOD PRESSURE: 84 MMHG | WEIGHT: 242 LBS

## 2024-07-29 DIAGNOSIS — I10 PRIMARY HYPERTENSION: Primary | ICD-10-CM

## 2024-07-29 PROCEDURE — 99214 OFFICE O/P EST MOD 30 MIN: CPT | Performed by: INTERNAL MEDICINE

## 2024-07-29 PROCEDURE — 1159F MED LIST DOCD IN RCRD: CPT | Performed by: INTERNAL MEDICINE

## 2024-07-29 PROCEDURE — 1126F AMNT PAIN NOTED NONE PRSNT: CPT | Performed by: INTERNAL MEDICINE

## 2024-07-29 PROCEDURE — 3078F DIAST BP <80 MM HG: CPT | Performed by: INTERNAL MEDICINE

## 2024-07-29 PROCEDURE — 1123F ACP DISCUSS/DSCN MKR DOCD: CPT | Performed by: INTERNAL MEDICINE

## 2024-07-29 PROCEDURE — 3008F BODY MASS INDEX DOCD: CPT | Performed by: INTERNAL MEDICINE

## 2024-07-29 PROCEDURE — 3074F SYST BP LT 130 MM HG: CPT | Performed by: INTERNAL MEDICINE

## 2024-07-29 PROCEDURE — 1036F TOBACCO NON-USER: CPT | Performed by: INTERNAL MEDICINE

## 2024-07-29 ASSESSMENT — COLUMBIA-SUICIDE SEVERITY RATING SCALE - C-SSRS
6. HAVE YOU EVER DONE ANYTHING, STARTED TO DO ANYTHING, OR PREPARED TO DO ANYTHING TO END YOUR LIFE?: NO
2. HAVE YOU ACTUALLY HAD ANY THOUGHTS OF KILLING YOURSELF?: NO
1. IN THE PAST MONTH, HAVE YOU WISHED YOU WERE DEAD OR WISHED YOU COULD GO TO SLEEP AND NOT WAKE UP?: NO

## 2024-07-29 ASSESSMENT — ENCOUNTER SYMPTOMS
OCCASIONAL FEELINGS OF UNSTEADINESS: 0
DEPRESSION: 0
LOSS OF SENSATION IN FEET: 0

## 2024-07-29 ASSESSMENT — PAIN SCALES - GENERAL: PAINLEVEL: 0-NO PAIN

## 2024-07-29 NOTE — PROGRESS NOTES
Subjective   Dixie Graf is a 74 y.o. female.    Chief Complaint:  CANDE GRAF is being seen for a six month month follow-up of chest pain, dyspnea, hypertension and a routine medication evaluation.     HPI  This is a 74 y/o female here today for a six month Cardiology follow up visit. Her CT Cardiac Calcium score is 7. She was diagnosed with Fibromyalgia. Reviewed lab work results and current medications. She denies chest pain, sob, heart palpitations, or lower leg edema. An Echocardiogram was done in 2021- see report. Continues to get Migraine headaches about 15 per week.     ROS    Objective   Physical Exam  General: Pleasant, 74 y/o female in no obvious distress.  HEENT: Normal EOMs without thyromegaly or lymphadenopathy.  Lungs: Clear with good air movement no crackles or wheezing.  Carotid: Normal JVP and carotid upstrokes without bruit.  Cardiac: There is a normal S1 and S2 with regular rhythm Has no murmur, rub, or S3.  Abdomen: Non-tender with normal bowel sounds and no bruits.  Extremities: Without lower leg edema. Skin: No acute rash.  Neuro: Intact without focal motor deficit.  Vascular: Normal radial pulses bilaterally. Normal distal pulses bilaterally     Lab Review:   Lab on 05/21/2024   Component Date Value    Glucose 05/21/2024 99     Sodium 05/21/2024 139     Potassium 05/21/2024 4.5     Chloride 05/21/2024 101     Bicarbonate 05/21/2024 28     Anion Gap 05/21/2024 15     Urea Nitrogen 05/21/2024 16     Creatinine 05/21/2024 1.24 (H)     eGFR 05/21/2024 46 (L)     Calcium 05/21/2024 9.3     Albumin 05/21/2024 4.0     Alkaline Phosphatase 05/21/2024 61     Total Protein 05/21/2024 7.1     AST 05/21/2024 21     Bilirubin, Total 05/21/2024 0.5     ALT 05/21/2024 16     Magnesium 05/21/2024 1.92     Ammonia 05/21/2024 33    Lab on 03/15/2024   Component Date Value    WBC 03/15/2024 11.8 (H)     nRBC 03/15/2024 0.0     RBC 03/15/2024 4.48     Hemoglobin 03/15/2024 13.0     Hematocrit 03/15/2024 40.9      MCV 03/15/2024 91     MCH 03/15/2024 29.0     MCHC 03/15/2024 31.8 (L)     RDW 03/15/2024 13.6     Platelets 03/15/2024 323     Neutrophils % 03/15/2024 67.1     Immature Granulocytes %,* 03/15/2024 0.3     Lymphocytes % 03/15/2024 18.2     Monocytes % 03/15/2024 10.7     Eosinophils % 03/15/2024 2.9     Basophils % 03/15/2024 0.8     Neutrophils Absolute 03/15/2024 7.91 (H)     Immature Granulocytes Ab* 03/15/2024 0.04     Lymphocytes Absolute 03/15/2024 2.14     Monocytes Absolute 03/15/2024 1.26 (H)     Eosinophils Absolute 03/15/2024 0.34     Basophils Absolute 03/15/2024 0.10     Glucose 03/15/2024 96     Sodium 03/15/2024 139     Potassium 03/15/2024 4.1     Chloride 03/15/2024 102     Bicarbonate 03/15/2024 31     Anion Gap 03/15/2024 10     Urea Nitrogen 03/15/2024 15     Creatinine 03/15/2024 1.18 (H)     eGFR 03/15/2024 49 (L)     Calcium 03/15/2024 9.0     Albumin 03/15/2024 3.7     Alkaline Phosphatase 03/15/2024 57     Total Protein 03/15/2024 6.5     AST 03/15/2024 15     Bilirubin, Total 03/15/2024 0.5     ALT 03/15/2024 17    Lab on 03/06/2024   Component Date Value    Glucose 03/06/2024 83     Sodium 03/06/2024 139     Potassium 03/06/2024 4.1     Chloride 03/06/2024 103     Bicarbonate 03/06/2024 26     Anion Gap 03/06/2024 14     Urea Nitrogen 03/06/2024 25 (H)     Creatinine 03/06/2024 1.28 (H)     eGFR 03/06/2024 45 (L)     Calcium 03/06/2024 9.2     Albumin 03/06/2024 3.7     Alkaline Phosphatase 03/06/2024 59     Total Protein 03/06/2024 6.9     AST 03/06/2024 15     Bilirubin, Total 03/06/2024 0.5     ALT 03/06/2024 14     Cholesterol 03/06/2024 217 (H)     HDL-Cholesterol 03/06/2024 48.0     Cholesterol/HDL Ratio 03/06/2024 4.5     LDL Calculated 03/06/2024 133 (H)     VLDL 03/06/2024 36     Triglycerides 03/06/2024 179 (H)     Non HDL Cholesterol 03/06/2024 169 (H)     Hemoglobin A1C 03/06/2024 5.5     Estimated Average Glucose 03/06/2024 111     Thyroid Stimulating Horm* 03/06/2024 1.61      WBC 03/06/2024 14.4 (H)     nRBC 03/06/2024 0.0     RBC 03/06/2024 4.78     Hemoglobin 03/06/2024 14.0     Hematocrit 03/06/2024 43.8     MCV 03/06/2024 92     MCH 03/06/2024 29.3     MCHC 03/06/2024 32.0     RDW 03/06/2024 13.5     Platelets 03/06/2024 418     Neutrophils % 03/06/2024 67.1     Immature Granulocytes %,* 03/06/2024 0.5     Lymphocytes % 03/06/2024 20.4     Monocytes % 03/06/2024 8.9     Eosinophils % 03/06/2024 2.3     Basophils % 03/06/2024 0.8     Neutrophils Absolute 03/06/2024 9.69 (H)     Immature Granulocytes Ab* 03/06/2024 0.07     Lymphocytes Absolute 03/06/2024 2.94     Monocytes Absolute 03/06/2024 1.28 (H)     Eosinophils Absolute 03/06/2024 0.33     Basophils Absolute 03/06/2024 0.11 (H)     C-Reactive Protein 03/06/2024 2.06 (H)     Sedimentation Rate 03/06/2024 27     HLAB27 Typing 03/06/2024 Negative     Hepatitis B Surface AG 03/06/2024 Nonreactive     Hepatitis A  AB- IgM 03/06/2024 Nonreactive     Hepatitis B Core AB; IgM 03/06/2024 Nonreactive     Hepatitis C AB 03/06/2024 Nonreactive     EMILY 03/06/2024 Negative     Anti-DNA (DS) 03/06/2024 <1.0     Anti-SSA 03/06/2024 <0.2     Anti-SSB 03/06/2024 <0.2     Creatine Kinase 03/06/2024 28     Thyroid Peroxidase (TPO)* 03/06/2024 <28     Anti-Thyroglobulin AB 03/06/2024 <0.9     Uric Acid 03/06/2024 6.8 (H)     Iron 03/06/2024 50     UIBC 03/06/2024 287     TIBC 03/06/2024 337     % Saturation 03/06/2024 15 (L)     Ferritin 03/06/2024 98     Vitamin D, 25-Hydroxy, T* 03/06/2024 33     Vitamin B12 03/06/2024 407     Parathyroid Hormone, Int* 03/06/2024 63.2     T-SPOT. TB Interpretation 03/06/2024 Negative     Panel A Spot Count 03/06/2024 0     Panel B Spot Count 03/06/2024 0     NIL(NEG) Control Spot Co* 03/06/2024 Passed     POS Control Spot Count 03/06/2024 Passed    Lab on 02/14/2024   Component Date Value    Glucose 02/14/2024 91     Sodium 02/14/2024 141     Potassium 02/14/2024 4.2     Chloride 02/14/2024 105     Bicarbonate  02/14/2024 27     Anion Gap 02/14/2024 13     Urea Nitrogen 02/14/2024 17     Creatinine 02/14/2024 1.12 (H)     eGFR 02/14/2024 52 (L)     Calcium 02/14/2024 10.0     Albumin 02/14/2024 4.1     Alkaline Phosphatase 02/14/2024 58     Total Protein 02/14/2024 7.2     AST 02/14/2024 23     Bilirubin, Total 02/14/2024 0.7     ALT 02/14/2024 24     BNP 02/14/2024 102 (H)     WBC 02/14/2024 10.8     nRBC 02/14/2024 0.0     RBC 02/14/2024 4.72     Hemoglobin 02/14/2024 13.3     Hematocrit 02/14/2024 41.5     MCV 02/14/2024 88     MCH 02/14/2024 28.2     MCHC 02/14/2024 32.0     RDW 02/14/2024 13.2     Platelets 02/14/2024 412     Cholesterol 02/14/2024 234 (H)     HDL-Cholesterol 02/14/2024 42.2     Cholesterol/HDL Ratio 02/14/2024 5.5     LDL Calculated 02/14/2024 160 (H)     VLDL 02/14/2024 32     Triglycerides 02/14/2024 161 (H)     Non HDL Cholesterol 02/14/2024 192 (H)     Thyroid Stimulating Horm* 02/14/2024 0.84     Color, Urine 02/14/2024 Yellow     Appearance, Urine 02/14/2024 Clear     Specific Gravity, Urine 02/14/2024 1.024     pH, Urine 02/14/2024 6.0     Protein, Urine 02/14/2024 10 (TRACE)     Glucose, Urine 02/14/2024 Normal     Blood, Urine 02/14/2024 NEGATIVE     Ketones, Urine 02/14/2024 NEGATIVE     Bilirubin, Urine 02/14/2024 NEGATIVE     Urobilinogen, Urine 02/14/2024 Normal     Nitrite, Urine 02/14/2024 NEGATIVE     Leukocyte Esterase, Urine 02/14/2024 25 Carl/µL (A)     WBC, Urine 02/14/2024 11-20 (A)     RBC, Urine 02/14/2024 3-5     Squamous Epithelial Cell* 02/14/2024 1-9 (SPARSE)     Bacteria, Urine 02/14/2024 1+ (A)     Mucus, Urine 02/14/2024 FEW     Calcium Oxalate Crystals* 02/14/2024 3+ (A)        Assessment/Plan   1.  Hypertension.  Systolic blood pressure in upper range of normal.  Readings have been slightly higher recently due to death in the family.  For now we will observe on current management which includes amlodipine 5 mg daily and metoprolol tartrate 25 mg twice daily.  Will  switch the patient to Toprol-XL 50 mg daily for easier administration.  2.  Coronary artery disease.  The patient's CT coronary calcium score was only 7.7 performed on 5/24/2023 and in the absence of any concerning symptoms she most likely will not require stress testing.  She did have a echocardiogram on 9/28/2023 that showed an LV ejection fraction of 55-60% trace mitral valve regurgitation mild to moderate left atrial enlargement.  3.  Mild hyperlipidemia.  Patient not currently on statin therapy due to the very low CT coronary calcium score.  Lab work 3/15/2024 included a normal SMA panel creatinine of 1.18 CBC with WBC 11,800.  Repeat labs 5/21/2024 included creatinine 1.24  4.  Psoriatic arthritis/ankylosing spondylitis.  Patient is being followed by rheumatology and presently is on Cosentyx.  She did have an MRI/MRA of the brain performed which was unremarkable.  5.  Hypothyroidism.

## 2024-08-05 ENCOUNTER — APPOINTMENT (OUTPATIENT)
Dept: RADIOLOGY | Facility: HOSPITAL | Age: 74
End: 2024-08-05
Payer: MEDICARE

## 2024-08-07 ENCOUNTER — OFFICE VISIT (OUTPATIENT)
Dept: PRIMARY CARE | Facility: CLINIC | Age: 74
End: 2024-08-07
Payer: MEDICARE

## 2024-08-07 VITALS
WEIGHT: 241 LBS | SYSTOLIC BLOOD PRESSURE: 152 MMHG | BODY MASS INDEX: 40.15 KG/M2 | HEIGHT: 65 IN | DIASTOLIC BLOOD PRESSURE: 80 MMHG

## 2024-08-07 DIAGNOSIS — M54.2 NECK PAIN: ICD-10-CM

## 2024-08-07 DIAGNOSIS — M54.9 BACK PAIN, UNSPECIFIED BACK LOCATION, UNSPECIFIED BACK PAIN LATERALITY, UNSPECIFIED CHRONICITY: ICD-10-CM

## 2024-08-07 DIAGNOSIS — T78.3XXD ANGIOEDEMA, SUBSEQUENT ENCOUNTER: ICD-10-CM

## 2024-08-07 DIAGNOSIS — J45.909 ACUTE ASTHMATIC BRONCHITIS (HHS-HCC): Primary | ICD-10-CM

## 2024-08-07 DIAGNOSIS — I10 HYPERTENSION, UNSPECIFIED TYPE: ICD-10-CM

## 2024-08-07 PROCEDURE — 1123F ACP DISCUSS/DSCN MKR DOCD: CPT | Performed by: INTERNAL MEDICINE

## 2024-08-07 PROCEDURE — 3008F BODY MASS INDEX DOCD: CPT | Performed by: INTERNAL MEDICINE

## 2024-08-07 PROCEDURE — 99214 OFFICE O/P EST MOD 30 MIN: CPT | Performed by: INTERNAL MEDICINE

## 2024-08-07 PROCEDURE — 3077F SYST BP >= 140 MM HG: CPT | Performed by: INTERNAL MEDICINE

## 2024-08-07 PROCEDURE — 3079F DIAST BP 80-89 MM HG: CPT | Performed by: INTERNAL MEDICINE

## 2024-08-07 RX ORDER — PREDNISONE 10 MG/1
TABLET ORAL
Qty: 18 TABLET | Refills: 0 | Status: SHIPPED | OUTPATIENT
Start: 2024-08-07 | End: 2024-08-15

## 2024-08-07 RX ORDER — DOXYCYCLINE 100 MG/1
100 CAPSULE ORAL 2 TIMES DAILY
Qty: 20 CAPSULE | Refills: 0 | Status: SHIPPED | OUTPATIENT
Start: 2024-08-07 | End: 2024-08-17

## 2024-08-07 ASSESSMENT — ENCOUNTER SYMPTOMS
LOSS OF SENSATION IN FEET: 0
DEPRESSION: 0
OCCASIONAL FEELINGS OF UNSTEADINESS: 0

## 2024-08-09 DIAGNOSIS — G43.009 MIGRAINE WITHOUT AURA AND WITHOUT STATUS MIGRAINOSUS, NOT INTRACTABLE: ICD-10-CM

## 2024-08-09 RX ORDER — BUTALBITAL, ACETAMINOPHEN AND CAFFEINE 50; 325; 40 MG/1; MG/1; MG/1
2 TABLET ORAL DAILY PRN
Qty: 20 TABLET | Refills: 0 | Status: SHIPPED | OUTPATIENT
Start: 2024-08-09

## 2024-08-21 ENCOUNTER — TELEPHONE (OUTPATIENT)
Dept: NEUROLOGY | Facility: CLINIC | Age: 74
End: 2024-08-21
Payer: MEDICARE

## 2024-08-21 DIAGNOSIS — M79.2 NEUROPATHIC PAIN: ICD-10-CM

## 2024-08-21 RX ORDER — GABAPENTIN 300 MG/1
300 CAPSULE ORAL 3 TIMES DAILY
Qty: 30 CAPSULE | Refills: 2 | Status: SHIPPED | OUTPATIENT
Start: 2024-08-21 | End: 2024-08-22 | Stop reason: SDUPTHER

## 2024-08-21 NOTE — TELEPHONE ENCOUNTER
Pt needs refill of gabapentin 300 mg capsule, Take 1 capsule (300 mg) po TID sent to [facility]

## 2024-08-22 DIAGNOSIS — M79.2 NEUROPATHIC PAIN: ICD-10-CM

## 2024-08-22 RX ORDER — GABAPENTIN 300 MG/1
300 CAPSULE ORAL 3 TIMES DAILY
Qty: 30 CAPSULE | Refills: 2 | Status: SHIPPED | OUTPATIENT
Start: 2024-08-22

## 2024-09-12 ENCOUNTER — OFFICE VISIT (OUTPATIENT)
Dept: PRIMARY CARE | Facility: CLINIC | Age: 74
End: 2024-09-12
Payer: MEDICARE

## 2024-09-12 VITALS
HEIGHT: 65 IN | WEIGHT: 241 LBS | BODY MASS INDEX: 40.15 KG/M2 | SYSTOLIC BLOOD PRESSURE: 132 MMHG | DIASTOLIC BLOOD PRESSURE: 76 MMHG

## 2024-09-12 DIAGNOSIS — M79.2 NEUROPATHIC PAIN: ICD-10-CM

## 2024-09-12 DIAGNOSIS — T78.3XXA ANGIOEDEMA, INITIAL ENCOUNTER: Primary | ICD-10-CM

## 2024-09-12 DIAGNOSIS — B37.0 ORAL THRUSH: ICD-10-CM

## 2024-09-12 DIAGNOSIS — R63.5 WEIGHT GAIN: ICD-10-CM

## 2024-09-12 PROCEDURE — 3075F SYST BP GE 130 - 139MM HG: CPT | Performed by: INTERNAL MEDICINE

## 2024-09-12 PROCEDURE — 1159F MED LIST DOCD IN RCRD: CPT | Performed by: INTERNAL MEDICINE

## 2024-09-12 PROCEDURE — 1123F ACP DISCUSS/DSCN MKR DOCD: CPT | Performed by: INTERNAL MEDICINE

## 2024-09-12 PROCEDURE — 99213 OFFICE O/P EST LOW 20 MIN: CPT | Performed by: INTERNAL MEDICINE

## 2024-09-12 PROCEDURE — 3078F DIAST BP <80 MM HG: CPT | Performed by: INTERNAL MEDICINE

## 2024-09-12 PROCEDURE — 3008F BODY MASS INDEX DOCD: CPT | Performed by: INTERNAL MEDICINE

## 2024-09-12 RX ORDER — GABAPENTIN 300 MG/1
300 CAPSULE ORAL 3 TIMES DAILY
Qty: 90 CAPSULE | Refills: 2 | Status: SHIPPED | OUTPATIENT
Start: 2024-09-12

## 2024-09-12 RX ORDER — NYSTATIN 100000 [USP'U]/ML
500000 SUSPENSION ORAL 4 TIMES DAILY
Qty: 280 ML | Refills: 0 | Status: SHIPPED | OUTPATIENT
Start: 2024-09-12

## 2024-09-12 RX ORDER — FLUCONAZOLE 100 MG/1
100 TABLET ORAL 2 TIMES DAILY
Qty: 10 TABLET | Refills: 0 | Status: SHIPPED | OUTPATIENT
Start: 2024-09-12 | End: 2024-09-17

## 2024-09-12 RX ORDER — ONDANSETRON 4 MG/1
4 TABLET, ORALLY DISINTEGRATING ORAL EVERY 8 HOURS PRN
COMMUNITY
Start: 2024-08-07

## 2024-09-12 RX ORDER — PREDNISONE 20 MG/1
1 TABLET ORAL
COMMUNITY
Start: 2024-09-04

## 2024-09-13 NOTE — PROGRESS NOTES
"Subjective   Patient ID: Dixie Wilks is a 74 y.o. female who presents for Thrush.    Ms. Glynn today came here for multiple medical issues. Her oral thrush came back.  She told me her angioedema is bothering her a lot.  She came for follow-up on various conditions.  Appetite and weight are okay.  No problem.    I have personally reviewed the patient's Past Medical History, Medications, Allergies, Social History, and Family History in the EMR.    Review of Systems   All other systems reviewed and are negative.    Objective   /76   Ht 1.651 m (5' 5\")   Wt 109 kg (241 lb)   BMI 40.10 kg/m²     Physical Exam  Vitals reviewed.   HENT:      Mouth/Throat:      Comments: Oral thrush present.  Cardiovascular:      Heart sounds: Normal heart sounds, S1 normal and S2 normal. No murmur heard.     No friction rub.   Pulmonary:      Effort: Pulmonary effort is normal.      Breath sounds: Normal breath sounds and air entry.   Abdominal:      Palpations: There is no hepatomegaly, splenomegaly or mass.   Musculoskeletal:      Right lower leg: No edema.      Left lower leg: No edema.   Lymphadenopathy:      Lower Body: No right inguinal adenopathy. No left inguinal adenopathy.   Neurological:      Cranial Nerves: Cranial nerves 2-12 are intact.      Sensory: No sensory deficit.      Motor: Motor function is intact.      Deep Tendon Reflexes: Reflexes are normal and symmetric.     LAB WORK: Laboratory testing discussed.    Assessment/Plan   Problem List Items Addressed This Visit             ICD-10-CM       Allergies and Adverse Reactions    Angioedema - Primary T78.3XXA     Other Visit Diagnoses         Codes    Neuropathic pain     M79.2    Relevant Medications    gabapentin (Neurontin) 300 mg capsule    Oral thrush     B37.0    Relevant Medications    fluconazole (Diflucan) 100 mg tablet    nystatin (Mycostatin) 100,000 unit/mL suspension    Weight gain     R63.5        1. Oral thrush.  Nystatin swish and swallow. " Diflucan given.  2. Angioedema.  I wonder whether we need modern therapy.  She is seeing allergist.  3. Weight gain, probably steroid shot effects.  I will monitor.  4. Follow-up appointment with me in routine check, but always happy to serve her anytime sooner if necessary.    Scribe Attestation  By signing my name below, I, Agusto Koo attest that this documentation has been prepared under the direction and in the presence of Yarelis Blanco MD.

## 2024-09-18 ENCOUNTER — OFFICE VISIT (OUTPATIENT)
Dept: PRIMARY CARE | Facility: CLINIC | Age: 74
End: 2024-09-18
Payer: MEDICARE

## 2024-09-18 VITALS — SYSTOLIC BLOOD PRESSURE: 126 MMHG | DIASTOLIC BLOOD PRESSURE: 58 MMHG | BODY MASS INDEX: 40.1 KG/M2 | WEIGHT: 241 LBS

## 2024-09-18 DIAGNOSIS — B00.9 HERPES: ICD-10-CM

## 2024-09-18 DIAGNOSIS — G43.719 CHRONIC MIGRAINE WITHOUT AURA, INTRACTABLE, WITHOUT STATUS MIGRAINOSUS: ICD-10-CM

## 2024-09-18 DIAGNOSIS — T78.3XXA ANGIOEDEMA, INITIAL ENCOUNTER: ICD-10-CM

## 2024-09-18 DIAGNOSIS — K12.0 APHTHOUS ULCERATION: Primary | ICD-10-CM

## 2024-09-18 PROCEDURE — 99213 OFFICE O/P EST LOW 20 MIN: CPT | Performed by: INTERNAL MEDICINE

## 2024-09-18 PROCEDURE — 1123F ACP DISCUSS/DSCN MKR DOCD: CPT | Performed by: INTERNAL MEDICINE

## 2024-09-18 PROCEDURE — 1159F MED LIST DOCD IN RCRD: CPT | Performed by: INTERNAL MEDICINE

## 2024-09-18 PROCEDURE — 3074F SYST BP LT 130 MM HG: CPT | Performed by: INTERNAL MEDICINE

## 2024-09-18 PROCEDURE — 3078F DIAST BP <80 MM HG: CPT | Performed by: INTERNAL MEDICINE

## 2024-09-18 RX ORDER — VALACYCLOVIR HYDROCHLORIDE 500 MG/1
500 TABLET, FILM COATED ORAL 2 TIMES DAILY
Qty: 6 TABLET | Refills: 0 | Status: SHIPPED | OUTPATIENT
Start: 2024-09-18 | End: 2024-09-21

## 2024-09-18 NOTE — PROGRESS NOTES
Subjective   Patient ID: Dixie Wilks is a 74 y.o. female who presents for Follow-up and ulceration in mouth.    1. The patient today came here for ulceration in mouth, not better, candida.  2. Migraine headache, not feeling any better.  She is a bit concerned.  On and off, it is affecting quality of her life.  She has to take steroid for angioedema and that makes her more prone to lot of problems.  She came for follow-up on various conditions.    I have personally reviewed the patient's Past Medical History, Medications, Allergies, Social History, and Family History in the EMR.    Review of Systems   All other systems reviewed and are negative.    Objective   /58   Wt 109 kg (241 lb)   BMI 40.10 kg/m²     Physical Exam  Vitals reviewed.   HENT:      Mouth/Throat:      Comments: Aphthous ulceration.  Cardiovascular:      Heart sounds: Normal heart sounds, S1 normal and S2 normal. No murmur heard.     No friction rub.   Pulmonary:      Effort: Pulmonary effort is normal.      Breath sounds: Normal breath sounds and air entry.   Abdominal:      Palpations: There is no hepatomegaly, splenomegaly or mass.   Musculoskeletal:      Right lower leg: No edema.      Left lower leg: No edema.   Lymphadenopathy:      Lower Body: No right inguinal adenopathy. No left inguinal adenopathy.   Skin:     Comments: Candida present.    Neurological:      Cranial Nerves: Cranial nerves 2-12 are intact.      Sensory: No sensory deficit.      Motor: Motor function is intact.      Deep Tendon Reflexes: Reflexes are normal and symmetric.     LAB WORK: Laboratory testing discussed.    Assessment/Plan   Problem List Items Addressed This Visit             ICD-10-CM       Allergies and Adverse Reactions    Angioedema T78.3XXA       Neuro    Chronic migraine without aura, intractable, without status migrainosus G43.719    Relevant Medications    rimegepant (Nurtec ODT) 75 mg tablet,disintegrating     Other Visit Diagnoses         Codes     Aphthous ulceration    -  Primary K12.0    Herpes     B00.9    Relevant Medications    valACYclovir (Valtrex) 500 mg tablet        1. Aphthous ulceration, probably herpes labialis.  We will try little Valtrex, drink enough water.  2. Headache, migraine problem.  We are going to do Nurtec one pill every other day.  This will develop as a curative as a preventive drug.  3. Angioedema on and off comes.  She is already seeing specialist.  4. Asthma, stable.  5. Follow-up appointment with me in about four weeks.    Scribe Attestation  By signing my name below, I, Agusto Koo attest that this documentation has been prepared under the direction and in the presence of Yarelis Blanco MD.

## 2024-10-02 ENCOUNTER — OFFICE VISIT (OUTPATIENT)
Dept: OTOLARYNGOLOGY | Facility: CLINIC | Age: 74
End: 2024-10-02
Payer: MEDICARE

## 2024-10-02 VITALS — TEMPERATURE: 98 F | HEIGHT: 65 IN | BODY MASS INDEX: 40.1 KG/M2

## 2024-10-02 DIAGNOSIS — T78.3XXA IDIOPATHIC ANGIOEDEMA, INITIAL ENCOUNTER: Primary | ICD-10-CM

## 2024-10-02 DIAGNOSIS — J30.9 ALLERGIC RHINITIS, UNSPECIFIED SEASONALITY, UNSPECIFIED TRIGGER: ICD-10-CM

## 2024-10-02 PROCEDURE — 99213 OFFICE O/P EST LOW 20 MIN: CPT | Performed by: OTOLARYNGOLOGY

## 2024-10-02 PROCEDURE — 1123F ACP DISCUSS/DSCN MKR DOCD: CPT | Performed by: OTOLARYNGOLOGY

## 2024-10-02 PROCEDURE — 1159F MED LIST DOCD IN RCRD: CPT | Performed by: OTOLARYNGOLOGY

## 2024-10-02 PROCEDURE — 1036F TOBACCO NON-USER: CPT | Performed by: OTOLARYNGOLOGY

## 2024-10-14 ENCOUNTER — OFFICE VISIT (OUTPATIENT)
Dept: PRIMARY CARE | Facility: CLINIC | Age: 74
End: 2024-10-14
Payer: MEDICARE

## 2024-10-14 VITALS
SYSTOLIC BLOOD PRESSURE: 122 MMHG | BODY MASS INDEX: 39.21 KG/M2 | DIASTOLIC BLOOD PRESSURE: 68 MMHG | WEIGHT: 244 LBS | HEIGHT: 66 IN

## 2024-10-14 DIAGNOSIS — L03.818 CELLULITIS OF OTHER SPECIFIED SITE: ICD-10-CM

## 2024-10-14 PROCEDURE — 3008F BODY MASS INDEX DOCD: CPT | Performed by: INTERNAL MEDICINE

## 2024-10-14 PROCEDURE — 1123F ACP DISCUSS/DSCN MKR DOCD: CPT | Performed by: INTERNAL MEDICINE

## 2024-10-14 PROCEDURE — 99214 OFFICE O/P EST MOD 30 MIN: CPT | Performed by: INTERNAL MEDICINE

## 2024-10-14 PROCEDURE — 3074F SYST BP LT 130 MM HG: CPT | Performed by: INTERNAL MEDICINE

## 2024-10-14 PROCEDURE — 3078F DIAST BP <80 MM HG: CPT | Performed by: INTERNAL MEDICINE

## 2024-10-15 RX ORDER — DOXYCYCLINE 100 MG/1
100 CAPSULE ORAL 2 TIMES DAILY
Qty: 20 CAPSULE | Refills: 0 | Status: SHIPPED | OUTPATIENT
Start: 2024-10-15 | End: 2024-10-25

## 2024-10-15 RX ORDER — BACITRACIN ZINC 500 UNIT/G
OINTMENT (GRAM) TOPICAL 2 TIMES DAILY
Qty: 14 G | Refills: 0 | Status: SHIPPED | OUTPATIENT
Start: 2024-10-15

## 2024-10-15 RX ORDER — CIPROFLOXACIN 500 MG/1
500 TABLET ORAL 2 TIMES DAILY
Qty: 20 TABLET | Refills: 0 | Status: SHIPPED | OUTPATIENT
Start: 2024-10-15 | End: 2024-10-25

## 2024-10-15 NOTE — PROGRESS NOTES
"Subjective   Patient ID: Dixie Wilks is a 74 y.o. female who presents for Follow-up.    Ms. Glynn today came here for pain and swelling in both legs, right worse than left.  She told me she was exposed to infection when she was visiting nursing home.  Red, inflamed, tender, fleshy, not feeling good.  Mild feverish feeling, not feeling good.  She came for follow-up.  She also has some lymphedema.    I have personally reviewed the patient's Past Medical History, Medications, Allergies, Social History, and Family History in the EMR.    Review of Systems   All other systems reviewed and are negative.    Objective   /68   Ht 1.676 m (5' 6\")   Wt 111 kg (244 lb)   BMI 39.38 kg/m²     Physical Exam  Vitals reviewed.   Cardiovascular:      Heart sounds: Normal heart sounds, S1 normal and S2 normal. No murmur heard.     No friction rub.   Pulmonary:      Effort: Pulmonary effort is normal.      Breath sounds: Normal breath sounds and air entry.   Abdominal:      Palpations: There is no hepatomegaly, splenomegaly or mass.   Musculoskeletal:      Right lower leg: No edema.      Left lower leg: No edema.      Comments: Both legs red, inflamed, tender.  Local temperature raised.   Lymphadenopathy:      Lower Body: No right inguinal adenopathy. No left inguinal adenopathy.   Neurological:      Cranial Nerves: Cranial nerves 2-12 are intact.      Sensory: No sensory deficit.      Motor: Motor function is intact.      Deep Tendon Reflexes: Reflexes are normal and symmetric.     LAB WORK: Laboratory testing discussed.    Assessment/Plan   Problem List Items Addressed This Visit             ICD-10-CM       Infectious Diseases    Cellulitis L03.90    Relevant Orders    Referral to Vascular Medicine   1. Cellulitis.  Doxycycline, Cipro, bacitracin.  Monitor.  2. Lymphedema and swelling.  She will benefit from the lymph pump.  Referred to Dr. Laura Fisher.  3. Immunocompromised.  Monitor.  4. ______ okay.  5. I shall see her " back in about a couple of weeks.    Scribe Attestation  By signing my name below, I, Agusto Koo attest that this documentation has been prepared under the direction and in the presence of Yarelis Blanco MD.

## 2024-10-16 ENCOUNTER — APPOINTMENT (OUTPATIENT)
Dept: NEUROLOGY | Facility: CLINIC | Age: 74
End: 2024-10-16
Payer: COMMERCIAL

## 2024-10-16 VITALS
DIASTOLIC BLOOD PRESSURE: 66 MMHG | HEIGHT: 65 IN | SYSTOLIC BLOOD PRESSURE: 132 MMHG | HEART RATE: 47 BPM | WEIGHT: 240 LBS | BODY MASS INDEX: 39.99 KG/M2

## 2024-10-16 DIAGNOSIS — R11.0 DAILY NAUSEA: ICD-10-CM

## 2024-10-16 DIAGNOSIS — G43.009 MIGRAINE WITHOUT AURA AND WITHOUT STATUS MIGRAINOSUS, NOT INTRACTABLE: ICD-10-CM

## 2024-10-16 DIAGNOSIS — M79.2 NEUROPATHIC PAIN: ICD-10-CM

## 2024-10-16 RX ORDER — GABAPENTIN 300 MG/1
300 CAPSULE ORAL 3 TIMES DAILY
Qty: 90 CAPSULE | Refills: 2 | Status: SHIPPED | OUTPATIENT
Start: 2024-10-16

## 2024-10-16 RX ORDER — BUTALBITAL, ACETAMINOPHEN AND CAFFEINE 50; 325; 40 MG/1; MG/1; MG/1
2 TABLET ORAL DAILY PRN
Qty: 20 TABLET | Refills: 0 | Status: SHIPPED | OUTPATIENT
Start: 2024-10-16

## 2024-10-16 RX ORDER — ONDANSETRON 4 MG/1
4 TABLET, FILM COATED ORAL EVERY 8 HOURS PRN
Qty: 60 TABLET | Refills: 2 | Status: SHIPPED | OUTPATIENT
Start: 2024-10-16

## 2024-10-16 NOTE — PATIENT INSTRUCTIONS
You have a stable migraine and pain syndrome (neuropathic pain).  Continue the current medications and check with Dr. Gibson about your low heart rate.  Follow up in 6 months.

## 2024-10-16 NOTE — PROGRESS NOTES
Subjective   Dixie Wilks is a 74 y.o. right-handed female.  HPI  This is a 74 year old woman with a history of angio-edema, migraines, last seen in 4/24.  She is on two immuno-suppressants: for angio-edema and two types of arthritis.  She had breakthrough pain when she tried reducing the gabapentin and is 80% controlled (left arm pain).  It has been 5 years since her fall- hit her head, and fractured her left arm.  She is immuno-suppressed.  Her migraines were controlled on the prednisone.  Currently, she is off the prednisone-has 2-3 per week, relatively quick in onset.      Objective   Neurological Exam  Physical Exam  I personally reviewed laboratory, radiographic, and medical studies which were pertinent for nothing.    Assessment/Plan

## 2024-10-21 ENCOUNTER — HOSPITAL ENCOUNTER (OUTPATIENT)
Dept: RADIOLOGY | Facility: HOSPITAL | Age: 74
Discharge: HOME | End: 2024-10-21
Payer: MEDICARE

## 2024-10-21 VITALS — WEIGHT: 240 LBS | HEIGHT: 65 IN | BODY MASS INDEX: 39.99 KG/M2

## 2024-10-21 DIAGNOSIS — Z12.31 SCREENING MAMMOGRAM, ENCOUNTER FOR: ICD-10-CM

## 2024-10-21 PROCEDURE — 77067 SCR MAMMO BI INCL CAD: CPT

## 2024-10-21 PROCEDURE — 77067 SCR MAMMO BI INCL CAD: CPT | Performed by: RADIOLOGY

## 2024-10-21 PROCEDURE — 77063 BREAST TOMOSYNTHESIS BI: CPT | Performed by: RADIOLOGY

## 2024-10-25 ENCOUNTER — TELEPHONE (OUTPATIENT)
Dept: CARDIOLOGY | Facility: CLINIC | Age: 74
End: 2024-10-25
Payer: MEDICARE

## 2024-10-28 DIAGNOSIS — I10 HYPERTENSION: Primary | ICD-10-CM

## 2024-10-28 RX ORDER — METOPROLOL SUCCINATE 25 MG/1
25 TABLET, EXTENDED RELEASE ORAL DAILY
Qty: 30 TABLET | Refills: 1 | Status: SHIPPED | OUTPATIENT
Start: 2024-10-28 | End: 2024-12-27

## 2024-10-28 RX ORDER — METOPROLOL SUCCINATE 25 MG/1
25 TABLET, EXTENDED RELEASE ORAL DAILY
COMMUNITY
End: 2024-10-28 | Stop reason: SDUPTHER

## 2024-10-29 ENCOUNTER — HOSPITAL ENCOUNTER (EMERGENCY)
Facility: HOSPITAL | Age: 74
Discharge: HOME | End: 2024-10-29
Attending: EMERGENCY MEDICINE
Payer: MEDICARE

## 2024-10-29 ENCOUNTER — APPOINTMENT (OUTPATIENT)
Dept: CARDIOLOGY | Facility: HOSPITAL | Age: 74
End: 2024-10-29
Payer: MEDICARE

## 2024-10-29 VITALS
DIASTOLIC BLOOD PRESSURE: 75 MMHG | HEIGHT: 66 IN | BODY MASS INDEX: 38.57 KG/M2 | TEMPERATURE: 97.9 F | SYSTOLIC BLOOD PRESSURE: 140 MMHG | OXYGEN SATURATION: 95 % | WEIGHT: 240 LBS | RESPIRATION RATE: 18 BRPM | HEART RATE: 50 BPM

## 2024-10-29 DIAGNOSIS — T78.3XXA ANGIOEDEMA, INITIAL ENCOUNTER: Primary | ICD-10-CM

## 2024-10-29 PROCEDURE — 2500000004 HC RX 250 GENERAL PHARMACY W/ HCPCS (ALT 636 FOR OP/ED): Performed by: EMERGENCY MEDICINE

## 2024-10-29 PROCEDURE — 93005 ELECTROCARDIOGRAM TRACING: CPT

## 2024-10-29 PROCEDURE — 96374 THER/PROPH/DIAG INJ IV PUSH: CPT

## 2024-10-29 PROCEDURE — 99284 EMERGENCY DEPT VISIT MOD MDM: CPT | Mod: 25

## 2024-10-29 PROCEDURE — 96375 TX/PRO/DX INJ NEW DRUG ADDON: CPT

## 2024-10-29 RX ORDER — EPINEPHRINE 0.3 MG/.3ML
1 INJECTION SUBCUTANEOUS ONCE AS NEEDED
Qty: 1 EACH | Refills: 0 | Status: SHIPPED | OUTPATIENT
Start: 2024-10-29

## 2024-10-29 RX ORDER — DIPHENHYDRAMINE HYDROCHLORIDE 50 MG/ML
50 INJECTION INTRAMUSCULAR; INTRAVENOUS ONCE
Status: COMPLETED | OUTPATIENT
Start: 2024-10-29 | End: 2024-10-29

## 2024-10-29 RX ORDER — PREDNISONE 50 MG/1
50 TABLET ORAL DAILY
Qty: 5 TABLET | Refills: 0 | Status: SHIPPED | OUTPATIENT
Start: 2024-10-29 | End: 2024-11-03

## 2024-10-29 ASSESSMENT — PAIN - FUNCTIONAL ASSESSMENT
PAIN_FUNCTIONAL_ASSESSMENT: 0-10
PAIN_FUNCTIONAL_ASSESSMENT: 0-10

## 2024-10-29 ASSESSMENT — PAIN SCALES - GENERAL
PAINLEVEL_OUTOF10: 0 - NO PAIN

## 2024-10-31 LAB
ATRIAL RATE: 69 BPM
P AXIS: 73 DEGREES
P OFFSET: 166 MS
P ONSET: 137 MS
PR INTERVAL: 170 MS
Q ONSET: 222 MS
QRS COUNT: 11 BEATS
QRS DURATION: 128 MS
QT INTERVAL: 412 MS
QTC CALCULATION(BAZETT): 441 MS
QTC FREDERICIA: 431 MS
R AXIS: -42 DEGREES
T AXIS: 72 DEGREES
T OFFSET: 428 MS
VENTRICULAR RATE: 69 BPM

## 2024-11-11 ENCOUNTER — TELEPHONE (OUTPATIENT)
Dept: NEUROLOGY | Facility: CLINIC | Age: 74
End: 2024-11-11
Payer: MEDICARE

## 2024-11-11 DIAGNOSIS — R11.0 DAILY NAUSEA: ICD-10-CM

## 2024-11-11 RX ORDER — ONDANSETRON 4 MG/1
4 TABLET, ORALLY DISINTEGRATING ORAL EVERY 8 HOURS PRN
Qty: 60 TABLET | Refills: 2 | Status: SHIPPED | OUTPATIENT
Start: 2024-11-11

## 2024-11-11 RX ORDER — ONDANSETRON 4 MG/1
4 TABLET, FILM COATED ORAL EVERY 8 HOURS PRN
Qty: 60 TABLET | Refills: 2 | Status: SHIPPED | OUTPATIENT
Start: 2024-11-11

## 2024-11-11 RX ORDER — ONDANSETRON 4 MG/1
4 TABLET, ORALLY DISINTEGRATING ORAL EVERY 8 HOURS PRN
COMMUNITY
End: 2024-11-11 | Stop reason: SDUPTHER

## 2024-11-11 NOTE — TELEPHONE ENCOUNTER
Pt wants refill of ondansetron (Zofran) 4 mg tablet, Take 1 tablet po every 8 hours if needed for nausea or vomiting BUT wants the self dissolving type (not what she previously had) sent to [facility]  
Statement Selected

## 2024-11-18 ENCOUNTER — OFFICE VISIT (OUTPATIENT)
Dept: PRIMARY CARE | Facility: CLINIC | Age: 74
End: 2024-11-18
Payer: MEDICARE

## 2024-11-18 VITALS
BODY MASS INDEX: 38.89 KG/M2 | DIASTOLIC BLOOD PRESSURE: 72 MMHG | SYSTOLIC BLOOD PRESSURE: 136 MMHG | HEIGHT: 66 IN | WEIGHT: 242 LBS

## 2024-11-18 DIAGNOSIS — E03.9 HYPOTHYROIDISM, UNSPECIFIED TYPE: ICD-10-CM

## 2024-11-18 DIAGNOSIS — R73.03 PRE-DIABETES: ICD-10-CM

## 2024-11-18 DIAGNOSIS — E78.00 HIGH CHOLESTEROL: ICD-10-CM

## 2024-11-18 DIAGNOSIS — I10 HYPERTENSION, UNSPECIFIED TYPE: ICD-10-CM

## 2024-11-18 DIAGNOSIS — R60.9 EDEMA, UNSPECIFIED TYPE: ICD-10-CM

## 2024-11-18 DIAGNOSIS — L03.90 CELLULITIS, UNSPECIFIED CELLULITIS SITE: ICD-10-CM

## 2024-11-18 PROCEDURE — 99214 OFFICE O/P EST MOD 30 MIN: CPT | Performed by: INTERNAL MEDICINE

## 2024-11-18 PROCEDURE — 3008F BODY MASS INDEX DOCD: CPT | Performed by: INTERNAL MEDICINE

## 2024-11-18 PROCEDURE — 3078F DIAST BP <80 MM HG: CPT | Performed by: INTERNAL MEDICINE

## 2024-11-18 PROCEDURE — 1159F MED LIST DOCD IN RCRD: CPT | Performed by: INTERNAL MEDICINE

## 2024-11-18 PROCEDURE — 3075F SYST BP GE 130 - 139MM HG: CPT | Performed by: INTERNAL MEDICINE

## 2024-11-18 PROCEDURE — 1123F ACP DISCUSS/DSCN MKR DOCD: CPT | Performed by: INTERNAL MEDICINE

## 2024-11-18 RX ORDER — POTASSIUM CHLORIDE 20 MEQ/1
20 TABLET, EXTENDED RELEASE ORAL 2 TIMES DAILY
Qty: 90 TABLET | Refills: 1 | Status: SHIPPED | OUTPATIENT
Start: 2024-11-18 | End: 2025-11-18

## 2024-11-18 RX ORDER — BACITRACIN 500 [USP'U]/G
1 OINTMENT TOPICAL 2 TIMES DAILY
COMMUNITY
Start: 2024-10-15

## 2024-11-18 RX ORDER — CIPROFLOXACIN 500 MG/1
500 TABLET ORAL 2 TIMES DAILY
Qty: 20 TABLET | Refills: 0 | Status: SHIPPED | OUTPATIENT
Start: 2024-11-18 | End: 2024-11-28

## 2024-11-18 RX ORDER — DOXYCYCLINE 100 MG/1
100 CAPSULE ORAL 2 TIMES DAILY
Qty: 14 CAPSULE | Refills: 0 | Status: SHIPPED | OUTPATIENT
Start: 2024-11-18 | End: 2024-11-25

## 2024-11-18 RX ORDER — LEVOTHYROXINE SODIUM 125 UG/1
125 TABLET ORAL DAILY
Qty: 90 TABLET | Refills: 1 | Status: SHIPPED | OUTPATIENT
Start: 2024-11-18 | End: 2025-11-18

## 2024-11-18 RX ORDER — BACITRACIN ZINC 500 UNIT/G
OINTMENT (GRAM) TOPICAL 2 TIMES DAILY
Qty: 113 G | Refills: 1 | Status: SHIPPED | OUTPATIENT
Start: 2024-11-18

## 2024-11-18 RX ORDER — PREDNISONE 20 MG/1
1 TABLET ORAL
COMMUNITY
Start: 2024-11-15

## 2024-11-18 RX ORDER — FUROSEMIDE 40 MG/1
40 TABLET ORAL 2 TIMES DAILY
Qty: 90 TABLET | Refills: 0 | Status: SHIPPED | OUTPATIENT
Start: 2024-11-18 | End: 2025-11-18

## 2024-11-18 RX ORDER — FAMOTIDINE 40 MG/1
1 TABLET, FILM COATED ORAL
COMMUNITY
Start: 2024-11-15

## 2024-11-19 NOTE — PROGRESS NOTES
"Subjective   Patient ID: Dixie Wilks is a 74 y.o. female who presents for Cellulitis.    1. Gretta Wilks today came here with red, inflamed, tender swelling into left leg below knee, above ankle.  Local temperature raised, not feeling good.  She told me she was in emergency room for angioedema, it got better.  2. Follow-up on other conditions.  Leg swells up.    I have personally reviewed the patient's Past Medical History, Medications, Allergies, Social History, and Family History in the EMR.    Review of Systems   All other systems reviewed and are negative.    Objective   /72   Ht 1.676 m (5' 6\")   Wt 110 kg (242 lb)   BMI 39.06 kg/m²     Physical Exam  Vitals reviewed.   Cardiovascular:      Heart sounds: Normal heart sounds, S1 normal and S2 normal. No murmur heard.     No friction rub.   Pulmonary:      Effort: Pulmonary effort is normal.      Breath sounds: Normal breath sounds and air entry.   Abdominal:      Palpations: There is no hepatomegaly, splenomegaly or mass.   Lymphadenopathy:      Lower Body: No right inguinal adenopathy. No left inguinal adenopathy.   Skin:     Comments: Left leg below knee above ankle cellulitis, red, inflamed, tender, edema present.   Neurological:      Cranial Nerves: Cranial nerves 2-12 are intact.      Sensory: No sensory deficit.      Motor: Motor function is intact.      Deep Tendon Reflexes: Reflexes are normal and symmetric.     LAB WORK: Laboratory testing discussed.    Assessment/Plan   Problem List Items Addressed This Visit             ICD-10-CM       Cardiac and Vasculature    Hypertension I10    Relevant Orders    CBC    Comprehensive Metabolic Panel       Endocrine/Metabolic    Hypothyroidism E03.9    Relevant Orders    Thyroid Stimulating Hormone       Infectious Diseases    Cellulitis L03.90    Relevant Medications    ciprofloxacin (Cipro) 500 mg tablet    bacitracin 500 unit/gram ointment    doxycycline (Vibramycin) 100 mg capsule       Symptoms and " Signs    Edema R60.9    Relevant Medications    furosemide (Lasix) 40 mg tablet    potassium chloride CR (Klor-Con M20) 20 mEq ER tablet     Other Visit Diagnoses         Codes    High cholesterol     E78.00    Relevant Orders    Lipid Panel    Pre-diabetes     R73.03    Relevant Orders    Hemoglobin A1C    Urinalysis with Reflex Culture and Microscopic            1. Cellulitis leg.  Local cleaning, Cipro, doxycycline, bacitracin.  2. Edema.  Lasix, potassium.  3. Hypertension, okay.  4. Cholesterol, stable.  5. Angioedema.  Repeat blood work ordered.  6. I shall see her in a week after tests.    Scribe Attestation  By signing my name below, I, Agusto Gomes attest that this documentation has been prepared under the direction and in the presence of Yarelis Blanco MD.

## 2024-11-25 ENCOUNTER — TELEPHONE (OUTPATIENT)
Dept: NEUROLOGY | Facility: CLINIC | Age: 74
End: 2024-11-25
Payer: MEDICARE

## 2024-11-25 DIAGNOSIS — G43.009 MIGRAINE WITHOUT AURA AND WITHOUT STATUS MIGRAINOSUS, NOT INTRACTABLE: ICD-10-CM

## 2024-11-25 RX ORDER — BUTALBITAL, ACETAMINOPHEN AND CAFFEINE 50; 325; 40 MG/1; MG/1; MG/1
2 TABLET ORAL DAILY PRN
Qty: 20 TABLET | Refills: 0 | Status: SHIPPED | OUTPATIENT
Start: 2024-11-25

## 2024-11-25 NOTE — TELEPHONE ENCOUNTER
Pt needs refill of butalbital-acetaminophen-caff -40 mg tablet, Take 2 tablets po once daily as needed for headaches or migraine, sent to [facility]

## 2024-11-30 ENCOUNTER — TELEMEDICINE (OUTPATIENT)
Dept: PRIMARY CARE | Facility: CLINIC | Age: 74
End: 2024-11-30
Payer: MEDICARE

## 2024-11-30 DIAGNOSIS — R60.9 EDEMA, UNSPECIFIED TYPE: ICD-10-CM

## 2024-11-30 DIAGNOSIS — M79.89 LEG SWELLING: ICD-10-CM

## 2024-11-30 DIAGNOSIS — L03.116 CELLULITIS OF LEFT LOWER EXTREMITY: Primary | ICD-10-CM

## 2024-11-30 PROCEDURE — 99214 OFFICE O/P EST MOD 30 MIN: CPT | Performed by: INTERNAL MEDICINE

## 2024-11-30 PROCEDURE — 1123F ACP DISCUSS/DSCN MKR DOCD: CPT | Performed by: INTERNAL MEDICINE

## 2024-11-30 RX ORDER — CIPROFLOXACIN 500 MG/1
500 TABLET ORAL 2 TIMES DAILY
Qty: 20 TABLET | Refills: 0 | Status: SHIPPED | OUTPATIENT
Start: 2024-11-30 | End: 2024-12-10

## 2024-11-30 RX ORDER — DOXYCYCLINE 100 MG/1
100 CAPSULE ORAL 2 TIMES DAILY
Qty: 20 CAPSULE | Refills: 0 | Status: SHIPPED | OUTPATIENT
Start: 2024-11-30 | End: 2024-12-10

## 2024-11-30 NOTE — PROGRESS NOTES
Subjective   Patient ID: Dixie Wilks is a 74 y.o. female who presents for Leg Swelling.    Dixie Wilks today came here for multiple medical issues.  Legs swell up at the end of the day.  She is concerned.  Cellulitis in left leg.  Red, inflamed and tender.  She came here for follow-up on various conditions.    I have personally reviewed the patient's Past Medical History, Medications, Allergies, Social History, and Family History in the EMR.    Review of Systems   All other systems reviewed and are negative.    Objective   Virtual exam. There were no vitals taken for this visit.    Physical Exam  Skin:     Comments: On camera, leg cellulitis, red, inflamed and tenderness.     LAB WORK:  Laboratory testing discussed.    Assessment/Plan   Problem List Items Addressed This Visit             ICD-10-CM       Infectious Diseases    Cellulitis - Primary L03.90       Symptoms and Signs    Leg swelling M79.89    Relevant Medications    doxycycline (Vibramycin) 100 mg capsule    ciprofloxacin (Cipro) 500 mg tablet    Edema R60.9   1.  Cellulitis in the legs.  Cipro and doxycycline given.  2. Edema, on Lasix and potassium.  3. Continue to follow.    Scribe Attestation  By signing my name below, ILeidy Scribe attest that this documentation has been prepared under the direction and in the presence of Yarelis Blanco MD.   All medical record entries made by the aliyahibjalen were personally dictated by me I have reviewed the chart and agree the record accurately reflects my personal performance of his history physical examination and management

## 2024-12-13 ENCOUNTER — APPOINTMENT (OUTPATIENT)
Dept: URGENT CARE | Age: 74
End: 2024-12-13
Payer: MEDICARE

## 2024-12-17 DIAGNOSIS — I10 PRIMARY HYPERTENSION: Primary | ICD-10-CM

## 2024-12-17 DIAGNOSIS — I10 HYPERTENSION: ICD-10-CM

## 2024-12-17 RX ORDER — METOPROLOL SUCCINATE 25 MG/1
25 TABLET, EXTENDED RELEASE ORAL DAILY
Qty: 90 TABLET | Refills: 3 | Status: SHIPPED | OUTPATIENT
Start: 2024-12-17 | End: 2025-12-17

## 2024-12-17 RX ORDER — AMLODIPINE BESYLATE 5 MG/1
5 TABLET ORAL DAILY
Qty: 90 TABLET | Refills: 3 | Status: SHIPPED | OUTPATIENT
Start: 2024-12-17 | End: 2025-12-17

## 2024-12-18 ENCOUNTER — OFFICE VISIT (OUTPATIENT)
Dept: PRIMARY CARE | Facility: CLINIC | Age: 74
End: 2024-12-18
Payer: MEDICARE

## 2024-12-18 VITALS
SYSTOLIC BLOOD PRESSURE: 134 MMHG | BODY MASS INDEX: 39.86 KG/M2 | DIASTOLIC BLOOD PRESSURE: 80 MMHG | HEIGHT: 66 IN | WEIGHT: 248 LBS

## 2024-12-18 DIAGNOSIS — H60.23 ACUTE MALIGNANT OTITIS EXTERNA OF BOTH EARS: Primary | ICD-10-CM

## 2024-12-18 DIAGNOSIS — L03.116 CELLULITIS OF LEFT LOWER EXTREMITY: ICD-10-CM

## 2024-12-18 RX ORDER — NEOMYCIN SULFATE, POLYMYXIN B SULFATE, HYDROCORTISONE 3.5; 10000; 1 MG/ML; [USP'U]/ML; MG/ML
2 SOLUTION/ DROPS AURICULAR (OTIC) 4 TIMES DAILY
Qty: 10 ML | Refills: 1 | Status: SHIPPED | OUTPATIENT
Start: 2024-12-18 | End: 2024-12-25

## 2024-12-18 RX ORDER — FEXOFENADINE HYDROCHLORIDE 180 MG/1
2 TABLET, FILM COATED ORAL
COMMUNITY
Start: 2024-12-11

## 2024-12-18 RX ORDER — DOXYCYCLINE 100 MG/1
100 CAPSULE ORAL 2 TIMES DAILY
Qty: 20 CAPSULE | Refills: 0 | Status: SHIPPED | OUTPATIENT
Start: 2024-12-18 | End: 2024-12-28

## 2024-12-19 NOTE — PROGRESS NOTES
"Subjective   Patient ID: Dixie Wilks is a 74 y.o. female who presents for Earache.    Ms. Glynn today came here for pain and swelling in the left ear, blocked completely.  She did put Q-Tip.  It is going on for the last several days.  Over-the-counter medications not helping.  Ears blocked.  Not feeling well.  Cough, yellow sputum, sinus congestion, postnasal drip.  Right ear okay.  It is all in the left ear.    I have personally reviewed the patient's Past Medical History, Medications, Allergies, Social History, and Family History in the EMR.    Earache     Review of Systems   HENT:  Positive for ear pain.    All other systems reviewed and are negative.    Objective   /80   Ht 1.676 m (5' 6\")   Wt 112 kg (248 lb)   BMI 40.03 kg/m²     Physical Exam  Vitals reviewed.   HENT:      Ears:      Comments: Left ear wax and debris and some dry blood present.  Pretty blocked.  Tragus sign positive.  Cardiovascular:      Heart sounds: Normal heart sounds, S1 normal and S2 normal. No murmur heard.     No friction rub.   Pulmonary:      Effort: Pulmonary effort is normal.      Breath sounds: Normal breath sounds and air entry.   Abdominal:      Palpations: There is no hepatomegaly, splenomegaly or mass.   Musculoskeletal:      Right lower leg: No edema.      Left lower leg: No edema.   Lymphadenopathy:      Lower Body: No right inguinal adenopathy. No left inguinal adenopathy.   Neurological:      Cranial Nerves: Cranial nerves 2-12 are intact.      Sensory: No sensory deficit.      Motor: Motor function is intact.      Deep Tendon Reflexes: Reflexes are normal and symmetric.     LAB WORK:  Laboratory testing discussed.    Ear Cerumen Removal    Date/Time: 12/18/2024 11:19 PM    Performed by: Yarelis Blanco MD  Authorized by: Yarelis Blanco MD    Comments:      Under aseptic and antiseptic precautions, I curetted left ear incomplete, very small amount of blood came.  Tympanic membrane okay.  Ear canal inflamed.  " Swelling and tenderness.  Tympanic membranes have slight retraction.    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 Left ear otitis externa.  Cortisporin ear drops.  No need for antibiotic.  Keep ear dry.  I will monitor.  2. I will continue to follow.  3. Follow up in a couple of days if she is not better.    Scribe Attestation  By signing my name below, ILeidy Scribe attest that this documentation has been prepared under the direction and in the presence of Yarelis Blanco MD.     All medical record entries made by the scribe were personally dictated by me I have reviewed the chart and agree the record accurately reflects my personal performance of his history physical examination and management

## 2025-01-07 ENCOUNTER — APPOINTMENT (OUTPATIENT)
Dept: OTOLARYNGOLOGY | Facility: CLINIC | Age: 75
End: 2025-01-07
Payer: MEDICARE

## 2025-01-07 VITALS — WEIGHT: 248 LBS | HEIGHT: 66 IN | BODY MASS INDEX: 39.86 KG/M2

## 2025-01-07 DIAGNOSIS — H61.22 HEARING LOSS OF LEFT EAR DUE TO CERUMEN IMPACTION: ICD-10-CM

## 2025-01-07 DIAGNOSIS — H90.3 BILATERAL SENSORINEURAL HEARING LOSS: ICD-10-CM

## 2025-01-07 DIAGNOSIS — J30.9 ALLERGIC RHINITIS, UNSPECIFIED SEASONALITY, UNSPECIFIED TRIGGER: ICD-10-CM

## 2025-01-07 DIAGNOSIS — T78.3XXA IDIOPATHIC ANGIOEDEMA, INITIAL ENCOUNTER: ICD-10-CM

## 2025-01-07 DIAGNOSIS — H61.22 IMPACTED CERUMEN OF LEFT EAR: Primary | ICD-10-CM

## 2025-01-07 PROCEDURE — 1159F MED LIST DOCD IN RCRD: CPT | Performed by: OTOLARYNGOLOGY

## 2025-01-07 PROCEDURE — 3008F BODY MASS INDEX DOCD: CPT | Performed by: OTOLARYNGOLOGY

## 2025-01-07 PROCEDURE — 1123F ACP DISCUSS/DSCN MKR DOCD: CPT | Performed by: OTOLARYNGOLOGY

## 2025-01-07 PROCEDURE — 1036F TOBACCO NON-USER: CPT | Performed by: OTOLARYNGOLOGY

## 2025-01-07 PROCEDURE — 99213 OFFICE O/P EST LOW 20 MIN: CPT | Performed by: OTOLARYNGOLOGY

## 2025-01-07 NOTE — PROGRESS NOTES
Chief Complaint   Patient presents with    Earache     LOV: 10/2024 TREATED BY PCP ON 12/18/2024 FOR LEFT EAR PRESSURE & DIZZINESS     HPI:  Her angioedema is doing somewhat better on Xolair.  She has dizziness related to a head injury she suffered in 2019 with significant concussion.  The left ear has felt plugged.  She was found to have cerumen impaction and possible otitis externa and is referred for cleaning out the ear.  She is not hearing well from the left ear    Recall from October 2024: She is here for additional opinion on recurrent angioedema.  She is following with Dr. Mcnally allergy/immunology.  She is having recurrent angioedema involving the entire tongue.  She had 4 episodes last week.  She wanted to make sure there is no additional problem with the tongue.  She is having nasal congestion  Recall from Dr. Reyes March 2024:  Gretta Wilks is a 74 y.o. female who presents today for evaluation of her larynx.  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  She treats herself with Benadryl, steroids, and rarely an EpiPen.  She also does see an allergy/immunologist and has had extensive workup both here and away for this problem.  She was recommended to follow-up with ENT to get visualization of the larynx for some hoarseness which has been present since about August.  She reports having a chemical exposure then which did trigger some recurrent angioedema.  No current shortness of breath or stridor.  No sore throat or dysphagia.  She continues to have some left-sided tinnitus after concussion last fall    PMH:  Past Medical History:   Diagnosis Date    Abnormal findings on diagnostic imaging of skull and head, not elsewhere classified 11/26/2019    Abnormal MRI of head    Angioedema     Arthritis     Atypical ductal hyperplasia, breast     Body mass index (BMI) 38.0-38.9, adult     BMI 38.0-38.9,adult    Headache, unspecified  09/18/2019    Headache    High cholesterol     History of angioedema     Hypertension     Hypothyroidism     Low back pain, unspecified 06/25/2015    Acute low back pain due to trauma    Migraine with aura, not intractable, without status migrainosus 12/12/2022    Migraine with aura and without status migrainosus, not intractable    Nausea 10/22/2015    Nausea    Neuropathic pain     Obesity     Pain in unspecified hip 08/22/2016    Hip pain    Personal history of other diseases of the digestive system 08/14/2013    History of gastroenteritis    Personal history of other diseases of the respiratory system 11/17/2014    History of acute sinusitis    Personal history of other diseases of the respiratory system 07/27/2015    History of acute bronchitis    Personal history of other diseases of urinary system 10/10/2013    History of hematuria    Personal history of other infectious and parasitic diseases 09/30/2015    History of athlete's foot    Personal history of other infectious and parasitic diseases 11/04/2015    History of candidiasis of mouth    Personal history of other specified conditions 11/30/2016    History of abdominal pain    Personal history of other specified conditions 02/06/2014    History of insomnia    Personal history of other specified conditions 10/07/2013    History of abdominal pain    Personal history of other specified conditions 10/10/2013    History of urinary frequency    Personal history of other specified conditions 10/22/2013    History of dizziness    Personal history of other specified conditions 10/22/2013    History of syncope    Postmenopausal      Past Surgical History:   Procedure Laterality Date    BREAST LUMPECTOMY      KNEE SURGERY  09/23/2013    Knee Surgery    MR HEAD ANGIO WO IV CONTRAST  09/08/2021    MR HEAD ANGIO WO IV CONTRAST [facility]    OTHER SURGICAL HISTORY  09/23/2013    Biopsy Tongue    OTHER SURGICAL HISTORY  09/23/2013    Gynecologic Laparoscopy  With Adhesiolysis    TONSILLECTOMY  02/09/2015    Tonsillectomy         Medications:     Current Outpatient Medications:     albuterol 90 mcg/actuation inhaler, Inhale 2 puffs 3 times a day., Disp: 18 g, Rfl: 3    Allergy Relief, fexofenadine, 180 mg tablet, Take 2 tablets (360 mg) by mouth every 12 hours., Disp: , Rfl:     amLODIPine (Norvasc) 5 mg tablet, Take 1 tablet (5 mg) by mouth once daily., Disp: 90 tablet, Rfl: 3    bacitracin 500 unit/gram ointment, Apply topically 2 times a day., Disp: 14 g, Rfl: 0    bacitracin 500 unit/gram ointment, Apply 1 Application topically 2 times a day., Disp: , Rfl:     butalbital-acetaminophen-caff -40 mg tablet, Take 2 tablets by mouth once daily as needed for headaches or migraine., Disp: 20 tablet, Rfl: 0    diazePAM (Valium) 5 mg tablet, TAKE 1 TABLET BY MOUTH EVERY 12 HOURS FOR UP TO 7 DAYS AS NEEDED FOR VERTIGO, Disp: , Rfl:     EPINEPHrine (Epipen) 0.3 mg/0.3 mL injection syringe, Inject 0.3 mL (0.3 mg) into the muscle 1 time if needed for anaphylaxis for up to 1 dose. Inject into upper leg. Call 911 after use., Disp: 1 each, Rfl: 0    EPINEPHrine 0.3 mg/0.3 mL injection syringe, Inject 0.3 mL (0.3 mg) into the muscle if needed for anaphylaxis. Inject into upper leg. Call 911 after use., Disp: 1 mL, Rfl: 0    famotidine (Pepcid) 40 mg tablet, Take 1 tablet (40 mg) by mouth every 12 hours., Disp: , Rfl:     fluticasone furoate-vilanteroL (Breo Ellipta) 200-25 mcg/dose inhaler, Inhale 1 puff once daily., Disp: 1 each, Rfl: 0    furosemide (Lasix) 40 mg tablet, Take 1 tablet (40 mg) by mouth 2 times a day., Disp: 90 tablet, Rfl: 0    gabapentin (Neurontin) 300 mg capsule, Take 1 capsule (300 mg) by mouth 3 times a day., Disp: 90 capsule, Rfl: 2    ipratropium-albuteroL (Duo-Neb) 0.5-2.5 mg/3 mL nebulizer solution, Take 3 mL by nebulization 3 times a day as needed for wheezing., Disp: 180 mL, Rfl: 11    meclizine (Antivert) 25 mg tablet, Take 1 tablet (25 mg) by  mouth 3 times a day., Disp: 30 tablet, Rfl: 3    metoprolol succinate XL (Toprol-XL) 25 mg 24 hr tablet, Take 1 tablet (25 mg) by mouth once daily. Do not crush or chew., Disp: 90 tablet, Rfl: 3    metoprolol tartrate (Lopressor) 25 mg tablet, TAKE 1 TABLET BY MOUTH TWICE  DAILY, Disp: 180 tablet, Rfl: 0    nebulizer accessories misc, 3 times daily, Disp: 100 each, Rfl: 5    omalizumab (Xolair) 150 mg injection, Inject under the skin every 28 (twenty-eight) days., Disp: , Rfl:     ondansetron (Zofran) 4 mg tablet, Take 1 tablet (4 mg) by mouth every 8 hours if needed for nausea or vomiting., Disp: 60 tablet, Rfl: 2    ondansetron ODT (Zofran-ODT) 4 mg disintegrating tablet, Take 1 tablet (4 mg) by mouth every 8 hours if needed for nausea or vomiting., Disp: 60 tablet, Rfl: 2    pantoprazole (ProtoNix) 40 mg EC tablet, Take 1 tablet (40 mg) by mouth once daily., Disp: 90 tablet, Rfl: 0    potassium chloride CR (Klor-Con M20) 20 mEq ER tablet, Take 1 tablet (20 mEq) by mouth 2 times a day. Do not crush or chew., Disp: 90 tablet, Rfl: 1    predniSONE (Deltasone) 20 mg tablet, Take 1 tablet (20 mg) by mouth early in the morning.., Disp: , Rfl:     secukinumab (Cosentyx Pen, 2 Pens,) 150 mg/mL self-injector pen, Inject 2 mL (300 mg) under the skin 1 (one) time per week., Disp: , Rfl:     Synthroid 125 mcg tablet, Take 1 tablet (125 mcg) by mouth once daily., Disp: 90 tablet, Rfl: 1    bacitracin 500 unit/gram ointment, Apply topically 2 times a day., Disp: 113 g, Rfl: 1    montelukast (Singulair) 10 mg tablet, Take 1 tablet (10 mg) by mouth once daily at bedtime., Disp: 30 tablet, Rfl: 3     Allergies:  Allergies   Allergen Reactions    Formaldehyde Anaphylaxis    Cefepime Rash    Colchicine Swelling    Hydromorphone Nausea/vomiting    Indomethacin Swelling    Penicillins Hives    Sulfa (Sulfonamide Antibiotics) Hives and Other    Methotrexate Nausea Only    Other Unknown     Allergy: Cardiac Monitor Leads, Reaction:  "Unknown  Allergy: Fiberglass, Reaction: Moderate/Swelling  Allergy: Resins/Plastics, Reaction: Severe/Anaphylaxis    No Known Food Allergies    Ubrelvy [Ubrogepant] Unknown    Prochlorperazine Unknown        ROS:  Review of systems normal unless stated otherwise in the HPI and/or PMH.    Physical Exam:  Height 1.676 m (5' 6\"), weight 112 kg (248 lb), not currently breastfeeding. Body mass index is 40.03 kg/m².     GENERAL APPEARANCE: Well developed and well nourished.  Alert and oriented in no acute distress.  Normal vocal quality.      HEAD/FACE: No erythema or edema or facial tenderness.  Normal facial nerve function bilaterally.    EAR:       EXTERNAL: Normal pinnas and external auditory canals left significant cerumen impaction and dried skin.  This was debrided under the microscope.  Resolution of symptoms       MIDDLE EAR: Tympanic membranes intact and mobile with normal landmarks.  Middle ear space appears well aerated.       TUBE STATUS: N/A       MASTOID CAVITY: N/A       HEARING: Gross hearing assessment is within normal limits.      NOSE:       VISUALIZED USING: Anterior rhinoscopy with headlight and nasal speculum.       DORSUM: Midline, nontraumatic appearance.       MUCOSA: Normal-appearing.       SECRETIONS: Normal.       SEPTUM: Midline and nonobstructing.       INFERIOR TURBINATES: Normal.       MIDDLE TURBINATES/MEATUS: N/A       BLEEDING: N/A         ORAL CAVITY/PHARYNX:       TEETH: Adequate dentition.       TONGUE: No mass or lesion.  Normal mobility.       FLOOR OF MOUTH: No mass or lesion.       PALATE: Normal hard palate, soft palate, and uvula.       OROPHARYNX: Normal without mass or lesion.  Tonsils absent       BUCCAL MUCOSA/GBS: Normal without mass or lesion.       LIPS: Normal.        NECK: No palpable masses or abnormal adenopathy.  Trachea is midline.    THYROID: No thyromegaly or palpable nodule.    SALIVARY GLANDS: Normal bilateral parotid and submandibular glands by inspection and " "palpation.    TMJ's: Normal.    NEURO: Cranial nerve exam grossly normal bilaterally.       Assessment/Plan   Gretta \"Dixie\" was seen today for earache.  Diagnoses and all orders for this visit:  Impacted cerumen of left ear (Primary)  Hearing loss of left ear due to cerumen impaction  Idiopathic angioedema, initial encounter  Allergic rhinitis, unspecified seasonality, unspecified trigger  Bilateral sensorineural hearing loss         I will defer workup and treatment of her angioedema to allergy and immunology.  Ear cleaned with symptomatic improvement.  Dizziness related to head injury.  She does have bilateral sensorineural hearing loss and can consider hearing aids    Sim Pickett MD    "

## 2025-01-29 DIAGNOSIS — M81.0 AGE RELATED OSTEOPOROSIS: ICD-10-CM

## 2025-01-29 DIAGNOSIS — E78.00 HIGH CHOLESTEROL: ICD-10-CM

## 2025-01-29 DIAGNOSIS — E03.9 HYPOTHYROIDISM, UNSPECIFIED TYPE: ICD-10-CM

## 2025-01-29 DIAGNOSIS — Z13.820 SCREENING FOR OSTEOPOROSIS: ICD-10-CM

## 2025-01-29 DIAGNOSIS — I10 HYPERTENSION, UNSPECIFIED TYPE: ICD-10-CM

## 2025-01-29 DIAGNOSIS — R73.03 PRE-DIABETES: ICD-10-CM

## 2025-01-31 ENCOUNTER — TELEPHONE (OUTPATIENT)
Dept: NEUROLOGY | Facility: CLINIC | Age: 75
End: 2025-01-31
Payer: MEDICARE

## 2025-01-31 NOTE — TELEPHONE ENCOUNTER
Pt needs refill on butalbital-acetaminophen-caff -40 mg tablet, Take 2 tablets po every day as needed for headaches or migraine sent to [facility] [place]

## 2025-02-03 ENCOUNTER — APPOINTMENT (OUTPATIENT)
Dept: CARDIOLOGY | Facility: CLINIC | Age: 75
End: 2025-02-03
Payer: MEDICARE

## 2025-02-03 DIAGNOSIS — G43.009 MIGRAINE WITHOUT AURA AND WITHOUT STATUS MIGRAINOSUS, NOT INTRACTABLE: ICD-10-CM

## 2025-02-03 RX ORDER — BUTALBITAL, ACETAMINOPHEN AND CAFFEINE 50; 325; 40 MG/1; MG/1; MG/1
2 TABLET ORAL DAILY PRN
Qty: 20 TABLET | Refills: 0 | Status: SHIPPED | OUTPATIENT
Start: 2025-02-03

## 2025-02-06 LAB
ALBUMIN SERPL-MCNC: 4 G/DL (ref 3.6–5.1)
ALP SERPL-CCNC: 62 U/L (ref 37–153)
ALT SERPL-CCNC: 12 U/L (ref 6–29)
ANION GAP SERPL CALCULATED.4IONS-SCNC: 7 MMOL/L (CALC) (ref 7–17)
AST SERPL-CCNC: 15 U/L (ref 10–35)
BILIRUB SERPL-MCNC: 0.8 MG/DL (ref 0.2–1.2)
BUN SERPL-MCNC: 13 MG/DL (ref 7–25)
CALCIUM SERPL-MCNC: 9.3 MG/DL (ref 8.6–10.4)
CHLORIDE SERPL-SCNC: 101 MMOL/L (ref 98–110)
CHOLEST SERPL-MCNC: 212 MG/DL
CHOLEST/HDLC SERPL: 5.2 (CALC)
CO2 SERPL-SCNC: 29 MMOL/L (ref 20–32)
CREAT SERPL-MCNC: 1.35 MG/DL (ref 0.6–1)
EGFRCR SERPLBLD CKD-EPI 2021: 41 ML/MIN/1.73M2
ERYTHROCYTE [DISTWIDTH] IN BLOOD BY AUTOMATED COUNT: 12.7 % (ref 11–15)
EST. AVERAGE GLUCOSE BLD GHB EST-MCNC: 123 MG/DL
EST. AVERAGE GLUCOSE BLD GHB EST-SCNC: 6.8 MMOL/L
GLUCOSE SERPL-MCNC: 98 MG/DL (ref 65–99)
HBA1C MFR BLD: 5.9 % OF TOTAL HGB
HCT VFR BLD AUTO: 45.1 % (ref 35–45)
HDLC SERPL-MCNC: 41 MG/DL
HGB BLD-MCNC: 14.4 G/DL (ref 11.7–15.5)
LDLC SERPL CALC-MCNC: 137 MG/DL (CALC)
MCH RBC QN AUTO: 29.6 PG (ref 27–33)
MCHC RBC AUTO-ENTMCNC: 31.9 G/DL (ref 32–36)
MCV RBC AUTO: 92.6 FL (ref 80–100)
NONHDLC SERPL-MCNC: 171 MG/DL (CALC)
PLATELET # BLD AUTO: 396 THOUSAND/UL (ref 140–400)
PMV BLD REES-ECKER: 9.9 FL (ref 7.5–12.5)
POTASSIUM SERPL-SCNC: 4.4 MMOL/L (ref 3.5–5.3)
PROT SERPL-MCNC: 7.4 G/DL (ref 6.1–8.1)
RBC # BLD AUTO: 4.87 MILLION/UL (ref 3.8–5.1)
SODIUM SERPL-SCNC: 137 MMOL/L (ref 135–146)
TRIGL SERPL-MCNC: 197 MG/DL
TSH SERPL-ACNC: 1.48 MIU/L (ref 0.4–4.5)
WBC # BLD AUTO: 9.4 THOUSAND/UL (ref 3.8–10.8)

## 2025-02-12 ENCOUNTER — APPOINTMENT (OUTPATIENT)
Dept: RADIOLOGY | Facility: CLINIC | Age: 75
End: 2025-02-12
Payer: MEDICARE

## 2025-02-14 ENCOUNTER — OFFICE VISIT (OUTPATIENT)
Dept: PRIMARY CARE | Facility: CLINIC | Age: 75
End: 2025-02-14
Payer: MEDICARE

## 2025-02-14 VITALS — DIASTOLIC BLOOD PRESSURE: 76 MMHG | SYSTOLIC BLOOD PRESSURE: 132 MMHG | HEIGHT: 66 IN | BODY MASS INDEX: 40.03 KG/M2

## 2025-02-14 DIAGNOSIS — H69.90 DYSFUNCTION OF EUSTACHIAN TUBE, UNSPECIFIED LATERALITY: ICD-10-CM

## 2025-02-14 DIAGNOSIS — K21.9 GASTROESOPHAGEAL REFLUX DISEASE, UNSPECIFIED WHETHER ESOPHAGITIS PRESENT: ICD-10-CM

## 2025-02-14 DIAGNOSIS — H66.90 OTITIS MEDIA, UNSPECIFIED LATERALITY, UNSPECIFIED OTITIS MEDIA TYPE: ICD-10-CM

## 2025-02-14 DIAGNOSIS — H60.90 OTITIS EXTERNA, UNSPECIFIED CHRONICITY, UNSPECIFIED LATERALITY, UNSPECIFIED TYPE: Primary | ICD-10-CM

## 2025-02-14 DIAGNOSIS — H66.90 EAR INFECTION: ICD-10-CM

## 2025-02-14 PROCEDURE — 3075F SYST BP GE 130 - 139MM HG: CPT | Performed by: INTERNAL MEDICINE

## 2025-02-14 PROCEDURE — 3078F DIAST BP <80 MM HG: CPT | Performed by: INTERNAL MEDICINE

## 2025-02-14 PROCEDURE — 1159F MED LIST DOCD IN RCRD: CPT | Performed by: INTERNAL MEDICINE

## 2025-02-14 PROCEDURE — 99214 OFFICE O/P EST MOD 30 MIN: CPT | Performed by: INTERNAL MEDICINE

## 2025-02-14 PROCEDURE — 1123F ACP DISCUSS/DSCN MKR DOCD: CPT | Performed by: INTERNAL MEDICINE

## 2025-02-14 RX ORDER — LORATADINE 10 MG/1
10 TABLET ORAL DAILY
Qty: 30 TABLET | Refills: 2 | Status: SHIPPED | OUTPATIENT
Start: 2025-02-14 | End: 2025-05-15

## 2025-02-14 RX ORDER — FLUTICASONE FUROATE 27.5 UG/1
1 SPRAY, METERED NASAL
Qty: 10 G | Refills: 5 | Status: SHIPPED | OUTPATIENT
Start: 2025-02-14 | End: 2026-02-14

## 2025-02-14 RX ORDER — DOXYCYCLINE 100 MG/1
100 CAPSULE ORAL 2 TIMES DAILY
Qty: 20 CAPSULE | Refills: 0 | Status: SHIPPED | OUTPATIENT
Start: 2025-02-14 | End: 2025-02-24

## 2025-02-14 RX ORDER — OFLOXACIN 3 MG/ML
5 SOLUTION AURICULAR (OTIC) 2 TIMES DAILY
Qty: 10 ML | Refills: 0 | Status: SHIPPED | OUTPATIENT
Start: 2025-02-14 | End: 2025-02-21

## 2025-02-14 ASSESSMENT — ENCOUNTER SYMPTOMS
OCCASIONAL FEELINGS OF UNSTEADINESS: 0
LOSS OF SENSATION IN FEET: 0
DEPRESSION: 0

## 2025-02-15 NOTE — PROGRESS NOTES
"Subjective   Patient ID: Dixie Wilks is a 74 y.o. female who presents for Earache.    Gretta Wilks today came here for multiple medical issues.  Sinus congestion, postnasal drip.  Both ears are popping, congested.  Not feeling good.  She saw Dr. Pickett.  He cleaned out her ears.    I have personally reviewed the patient's Past Medical History, Medications, Allergies, Social History, and Family History in the EMR.    Review of Systems   All other systems reviewed and are negative.    Objective   /76   Ht 1.676 m (5' 6\")   BMI 40.03 kg/m²     Physical Exam  Vitals reviewed.   HENT:      Right Ear: Tympanic membrane is retracted.      Left Ear: Tympanic membrane is retracted.      Ears:      Comments: Canal inflamed.     Nose: Congestion present.      Comments: Postnasal drip.     Mouth/Throat:      Comments: Throat congested.  Cardiovascular:      Heart sounds: Normal heart sounds, S1 normal and S2 normal. No murmur heard.     No friction rub.   Pulmonary:      Effort: Pulmonary effort is normal.      Breath sounds: Normal breath sounds and air entry.   Abdominal:      Palpations: There is no hepatomegaly, splenomegaly or mass.   Musculoskeletal:      Right lower leg: No edema.      Left lower leg: No edema.   Lymphadenopathy:      Lower Body: No right inguinal adenopathy. No left inguinal adenopathy.   Neurological:      Cranial Nerves: Cranial nerves 2-12 are intact.      Sensory: No sensory deficit.      Motor: Motor function is intact.      Deep Tendon Reflexes: Reflexes are normal and symmetric.     LAB WORK:  Laboratory testing discussed.    Assessment/Plan   Problem List Items Addressed This Visit             ICD-10-CM       Gastrointestinal and Abdominal    GERD (gastroesophageal reflux disease) K21.9     Other Visit Diagnoses         Codes    Otitis externa, unspecified chronicity, unspecified laterality, unspecified type    -  Primary H60.90    Ear infection     H66.90    Relevant Medications    " doxycycline (Vibramycin) 100 mg capsule    loratadine (Claritin) 10 mg tablet    fluticasone (Flonase Sensimist) 27.5 mcg/actuation nasal spray    ofloxacin (Floxin) 0.3 % otic solution    Dysfunction of Eustachian tube, unspecified laterality     H69.90    Otitis media, unspecified laterality, unspecified otitis media type     H66.90        1. Otitis externa.  Floxin drops.  2. Eustachian tube dysfunction and otitis media.  Saline gargles and steam inhalation. Valsalva.  Doxy, Claritin, Robitussin, and Flonase.  3. GERD.  Prilosec helps.  4. Follow up in two to three weeks if she is not better.    Scribe Attestation  By signing my name below, ILeidy Scribe attest that this documentation has been prepared under the direction and in the presence of Yarelis Blanco MD.   All medical record entries made by the aliyahibjalen were personally dictated by me I have reviewed the chart and agree the record accurately reflects my personal performance of his history physical examination and management

## 2025-02-17 ENCOUNTER — APPOINTMENT (OUTPATIENT)
Dept: RADIOLOGY | Facility: CLINIC | Age: 75
End: 2025-02-17
Payer: MEDICARE

## 2025-02-18 DIAGNOSIS — K21.9 GASTROESOPHAGEAL REFLUX DISEASE WITHOUT ESOPHAGITIS: ICD-10-CM

## 2025-02-18 DIAGNOSIS — H66.90 EAR INFECTION: ICD-10-CM

## 2025-02-18 DIAGNOSIS — R11.0 DAILY NAUSEA: ICD-10-CM

## 2025-02-18 RX ORDER — NEOMYCIN SULFATE, POLYMYXIN B SULFATE, HYDROCORTISONE 3.5; 10000; 1 MG/ML; [USP'U]/ML; MG/ML
2 SOLUTION/ DROPS AURICULAR (OTIC) 4 TIMES DAILY
Qty: 10 ML | Refills: 0 | Status: SHIPPED | OUTPATIENT
Start: 2025-02-18 | End: 2025-02-25

## 2025-02-18 RX ORDER — FLUTICASONE FUROATE 27.5 UG/1
1 SPRAY, METERED NASAL
Qty: 10 G | Refills: 5 | Status: SHIPPED | OUTPATIENT
Start: 2025-02-18 | End: 2026-02-18

## 2025-02-18 RX ORDER — MECLIZINE HYDROCHLORIDE 25 MG/1
25 TABLET ORAL 3 TIMES DAILY
Qty: 30 TABLET | Refills: 3 | Status: SHIPPED | OUTPATIENT
Start: 2025-02-18

## 2025-02-18 RX ORDER — PANTOPRAZOLE SODIUM 40 MG/1
40 TABLET, DELAYED RELEASE ORAL DAILY
Qty: 90 TABLET | Refills: 1 | Status: SHIPPED | OUTPATIENT
Start: 2025-02-18

## 2025-02-24 ENCOUNTER — APPOINTMENT (OUTPATIENT)
Dept: CARDIOLOGY | Facility: CLINIC | Age: 75
End: 2025-02-24
Payer: MEDICARE

## 2025-02-25 ENCOUNTER — OFFICE VISIT (OUTPATIENT)
Dept: OTOLARYNGOLOGY | Facility: CLINIC | Age: 75
End: 2025-02-25
Payer: MEDICARE

## 2025-02-25 VITALS — BODY MASS INDEX: 39.86 KG/M2 | WEIGHT: 248 LBS | HEIGHT: 66 IN

## 2025-02-25 DIAGNOSIS — H90.3 BILATERAL SENSORINEURAL HEARING LOSS: ICD-10-CM

## 2025-02-25 DIAGNOSIS — H60.543 ECZEMA OF BOTH EXTERNAL EARS: Primary | ICD-10-CM

## 2025-02-25 DIAGNOSIS — H61.22 HEARING LOSS OF LEFT EAR DUE TO CERUMEN IMPACTION: ICD-10-CM

## 2025-02-25 DIAGNOSIS — T78.3XXA IDIOPATHIC ANGIOEDEMA, INITIAL ENCOUNTER: ICD-10-CM

## 2025-02-25 DIAGNOSIS — J30.9 ALLERGIC RHINITIS, UNSPECIFIED SEASONALITY, UNSPECIFIED TRIGGER: ICD-10-CM

## 2025-02-25 PROCEDURE — 1036F TOBACCO NON-USER: CPT | Performed by: OTOLARYNGOLOGY

## 2025-02-25 PROCEDURE — 1159F MED LIST DOCD IN RCRD: CPT | Performed by: OTOLARYNGOLOGY

## 2025-02-25 PROCEDURE — 1123F ACP DISCUSS/DSCN MKR DOCD: CPT | Performed by: OTOLARYNGOLOGY

## 2025-02-25 PROCEDURE — 99214 OFFICE O/P EST MOD 30 MIN: CPT | Performed by: OTOLARYNGOLOGY

## 2025-02-25 PROCEDURE — 3008F BODY MASS INDEX DOCD: CPT | Performed by: OTOLARYNGOLOGY

## 2025-02-25 RX ORDER — FLUOCINOLONE ACETONIDE 0.11 MG/ML
OIL AURICULAR (OTIC)
Qty: 20 ML | Refills: 3 | Status: SHIPPED | OUTPATIENT
Start: 2025-02-25

## 2025-02-27 DIAGNOSIS — R11.0 DAILY NAUSEA: ICD-10-CM

## 2025-02-27 RX ORDER — ONDANSETRON 4 MG/1
4 TABLET, ORALLY DISINTEGRATING ORAL EVERY 8 HOURS PRN
Qty: 60 TABLET | Refills: 2 | Status: SHIPPED | OUTPATIENT
Start: 2025-02-27

## 2025-03-10 ENCOUNTER — OFFICE VISIT (OUTPATIENT)
Dept: CARDIOLOGY | Facility: CLINIC | Age: 75
End: 2025-03-10
Payer: MEDICARE

## 2025-03-10 VITALS
HEART RATE: 64 BPM | OXYGEN SATURATION: 94 % | SYSTOLIC BLOOD PRESSURE: 152 MMHG | HEIGHT: 66 IN | WEIGHT: 242.1 LBS | DIASTOLIC BLOOD PRESSURE: 84 MMHG | BODY MASS INDEX: 38.91 KG/M2

## 2025-03-10 DIAGNOSIS — I89.0 LYMPHEDEMA: Primary | ICD-10-CM

## 2025-03-10 PROCEDURE — 1123F ACP DISCUSS/DSCN MKR DOCD: CPT | Performed by: INTERNAL MEDICINE

## 2025-03-10 PROCEDURE — 1160F RVW MEDS BY RX/DR IN RCRD: CPT | Performed by: INTERNAL MEDICINE

## 2025-03-10 PROCEDURE — 3008F BODY MASS INDEX DOCD: CPT | Performed by: INTERNAL MEDICINE

## 2025-03-10 PROCEDURE — 1159F MED LIST DOCD IN RCRD: CPT | Performed by: INTERNAL MEDICINE

## 2025-03-10 PROCEDURE — 99214 OFFICE O/P EST MOD 30 MIN: CPT | Performed by: INTERNAL MEDICINE

## 2025-03-10 PROCEDURE — 3075F SYST BP GE 130 - 139MM HG: CPT | Performed by: INTERNAL MEDICINE

## 2025-03-10 PROCEDURE — 3078F DIAST BP <80 MM HG: CPT | Performed by: INTERNAL MEDICINE

## 2025-03-10 PROCEDURE — 1126F AMNT PAIN NOTED NONE PRSNT: CPT | Performed by: INTERNAL MEDICINE

## 2025-03-10 ASSESSMENT — PAIN SCALES - GENERAL: PAINLEVEL_OUTOF10: 0-NO PAIN

## 2025-03-10 NOTE — PROGRESS NOTES
Subjective   Dixie Graf is a 74 y.o. female.    Chief Complaint:  CANDE GRAF is being seen for a six month month follow-up of chest pain, dyspnea, hypertension and a routine medication evaluation.     HPI  This is a 72 y/o female here today for a six month Cardiology follow up visit. Her CT Cardiac Calcium score is 7. She was diagnosed with Fibromyalgia. Reviewed lab work results and current medications. She denies chest pain, sob, heart palpitations, or lower leg edema. An Echocardiogram was done in 2021- see report. Continues to get Migraine headaches about 15 per week.     ROS    Objective   Physical Exam  General: Pleasant, 72 y/o female in no obvious distress.  HEENT: Normal EOMs without thyromegaly or lymphadenopathy.  Lungs: Clear with good air movement no crackles or wheezing.  Carotid: Normal JVP and carotid upstrokes without bruit.  Cardiac: There is a normal S1 and S2 with regular rhythm Has no murmur, rub, or S3.  Abdomen: Non-tender with normal bowel sounds and no bruits.  Extremities: Without lower leg edema. Skin: No acute rash.  Neuro: Intact without focal motor deficit.  Vascular: Normal radial pulses bilaterally. Normal distal pulses bilaterally     Lab Review:   Orders Only on 01/29/2025   Component Date Value    WHITE BLOOD CELL COUNT 02/05/2025 9.4     RED BLOOD CELL COUNT 02/05/2025 4.87     HEMOGLOBIN 02/05/2025 14.4     HEMATOCRIT 02/05/2025 45.1 (H)     MCV 02/05/2025 92.6     MCH 02/05/2025 29.6     MCHC 02/05/2025 31.9 (L)     RDW 02/05/2025 12.7     PLATELET COUNT 02/05/2025 396     MPV 02/05/2025 9.9     GLUCOSE 02/05/2025 98     UREA NITROGEN (BUN) 02/05/2025 13     CREATININE 02/05/2025 1.35 (H)     EGFR 02/05/2025 41 (L)     SODIUM 02/05/2025 137     POTASSIUM 02/05/2025 4.4     CHLORIDE 02/05/2025 101     CARBON DIOXIDE 02/05/2025 29     ELECTROLYTE BALANCE 02/05/2025 7     CALCIUM 02/05/2025 9.3     PROTEIN, TOTAL 02/05/2025 7.4     ALBUMIN 02/05/2025 4.0     BILIRUBIN, TOTAL  02/05/2025 0.8     ALKALINE PHOSPHATASE 02/05/2025 62     AST 02/05/2025 15     ALT 02/05/2025 12     CHOLESTEROL, TOTAL 02/05/2025 212 (H)     HDL CHOLESTEROL 02/05/2025 41 (L)     TRIGLYCERIDES 02/05/2025 197 (H)     LDL-CHOLESTEROL 02/05/2025 137 (H)     CHOL/HDLC RATIO 02/05/2025 5.2 (H)     NON HDL CHOLESTEROL 02/05/2025 171 (H)     TSH 02/05/2025 1.48     HEMOGLOBIN A1c 02/05/2025 5.9 (H)     eAG (mg/dL) 02/05/2025 123     eAG (mmol/L) 02/05/2025 6.8    Admission on 10/29/2024, Discharged on 10/29/2024   Component Date Value    Ventricular Rate 10/29/2024 69     Atrial Rate 10/29/2024 69     AR Interval 10/29/2024 170     QRS Duration 10/29/2024 128     QT Interval 10/29/2024 412     QTC Calculation(Bazett) 10/29/2024 441     P Axis 10/29/2024 73     R Axis 10/29/2024 -42     T Axis 10/29/2024 72     QRS Count 10/29/2024 11     Q Onset 10/29/2024 222     P Onset 10/29/2024 137     P Offset 10/29/2024 166     T Offset 10/29/2024 428     QTC Fredericia 10/29/2024 431        Assessment/Plan   1.  Hypertension.  Systolic blood pressure in upper range of normal.  Readings have been slightly higher recently due to death in the family.  For now we will observe on current management which includes amlodipine 5 mg daily and metoprolol tartrate 25 mg twice daily.  Will switch the patient to Toprol-XL 50 mg daily for easier administration.  Patient presently taking the Toprol-XL 2025 mg daily as of 3/10/2025.  2.  Coronary artery disease.  The patient's CT coronary calcium score was only 7.7 performed on 5/24/2023 and in the absence of any concerning symptoms she most likely will not require stress testing.  She did have a echocardiogram on 9/28/2023 that showed an LV ejection fraction of 55-60% trace mitral valve regurgitation mild to moderate left atrial enlargement.  3.  Mild hyperlipidemia.  Patient not currently on statin therapy due to the very low CT coronary calcium score.  Lab work 3/15/2024 included a normal  SMA panel creatinine of 1.18 CBC with WBC 11,800.  Repeat labs 5/21/2024 included creatinine 1.24 lab work from 2/5/2025 includes a lipid panel with cholesterol 212  HDL 41 triglyceride 197.  CMP was normal except creatinine 1.35.  Glycohemoglobin 5.9% TSH 1.48.  CBC normal with hematocrit being 45.1.  4.  Psoriatic arthritis/ankylosing spondylitis.  Patient is being followed by rheumatology and presently is on Cosentyx.  She did have an MRI/MRA of the brain performed which was unremarkable.  5.  Hypothyroidism.  6.  Status post right total knee replacement.  7.  Status post left humeral fracture and subsequent left ulnar nerve transposition.  This patient had a remote left humeral fracture but was allergic to the plastic component of her cast which had to be redone in plaster.  She ultimately had a clamshell sling for an extended period of time and then needed a left ulnar nerve transposition.  8.  Lower extremity edema secondary to venous insufficiency/lymphedema.  Patient currently not taking previously prescribed Lasix 40 mg twice daily not helpful for her edema/lymphedema.  She will be referred to lymphedema clinic.  9.  Idiopathic angioedema.  Patient has multiple chemical sensitivities followed by allergy and receives injections once per month complicated by some degree of hair loss.

## 2025-03-10 NOTE — PATIENT INSTRUCTIONS
Schedule with lymphedema clinic  Continue current medications  Follow up with Dr Gibson in 6 months.

## 2025-03-12 ENCOUNTER — APPOINTMENT (OUTPATIENT)
Dept: ORTHOPEDIC SURGERY | Facility: CLINIC | Age: 75
End: 2025-03-12
Payer: MEDICARE

## 2025-03-19 ENCOUNTER — OFFICE VISIT (OUTPATIENT)
Dept: PRIMARY CARE | Facility: CLINIC | Age: 75
End: 2025-03-19
Payer: MEDICARE

## 2025-03-19 VITALS
HEIGHT: 66 IN | BODY MASS INDEX: 39.05 KG/M2 | DIASTOLIC BLOOD PRESSURE: 70 MMHG | WEIGHT: 243 LBS | SYSTOLIC BLOOD PRESSURE: 142 MMHG

## 2025-03-19 DIAGNOSIS — H60.90 OTITIS EXTERNA, UNSPECIFIED CHRONICITY, UNSPECIFIED LATERALITY, UNSPECIFIED TYPE: ICD-10-CM

## 2025-03-19 DIAGNOSIS — L30.9 DERMATITIS: Primary | ICD-10-CM

## 2025-03-19 PROCEDURE — 3077F SYST BP >= 140 MM HG: CPT | Performed by: INTERNAL MEDICINE

## 2025-03-19 PROCEDURE — G2211 COMPLEX E/M VISIT ADD ON: HCPCS | Performed by: INTERNAL MEDICINE

## 2025-03-19 PROCEDURE — 3008F BODY MASS INDEX DOCD: CPT | Performed by: INTERNAL MEDICINE

## 2025-03-19 PROCEDURE — 99214 OFFICE O/P EST MOD 30 MIN: CPT | Performed by: INTERNAL MEDICINE

## 2025-03-19 PROCEDURE — 1159F MED LIST DOCD IN RCRD: CPT | Performed by: INTERNAL MEDICINE

## 2025-03-19 PROCEDURE — 3078F DIAST BP <80 MM HG: CPT | Performed by: INTERNAL MEDICINE

## 2025-03-19 PROCEDURE — 1123F ACP DISCUSS/DSCN MKR DOCD: CPT | Performed by: INTERNAL MEDICINE

## 2025-03-19 RX ORDER — ECONAZOLE NITRATE 10 MG/G
CREAM TOPICAL 2 TIMES DAILY PRN
Qty: 300 G | Refills: 0 | Status: SHIPPED | OUTPATIENT
Start: 2025-03-19 | End: 2025-07-17

## 2025-03-19 RX ORDER — FLUCONAZOLE 100 MG/1
100 TABLET ORAL 2 TIMES DAILY
Qty: 28 TABLET | Refills: 0 | Status: SHIPPED | OUTPATIENT
Start: 2025-03-19 | End: 2025-04-02

## 2025-03-21 DIAGNOSIS — G43.009 MIGRAINE WITHOUT AURA AND WITHOUT STATUS MIGRAINOSUS, NOT INTRACTABLE: ICD-10-CM

## 2025-03-21 NOTE — TELEPHONE ENCOUNTER
Pt needs refill on butalbital-acetaminophen-caff -40 mg tablet, Take 2 tablets po every day as needed for headaches or migraine, sent to [facility]

## 2025-03-24 RX ORDER — BUTALBITAL, ACETAMINOPHEN AND CAFFEINE 50; 325; 40 MG/1; MG/1; MG/1
2 TABLET ORAL DAILY PRN
Qty: 20 TABLET | Refills: 0 | Status: SHIPPED | OUTPATIENT
Start: 2025-03-24

## 2025-03-26 ENCOUNTER — APPOINTMENT (OUTPATIENT)
Dept: ENDOCRINOLOGY | Facility: CLINIC | Age: 75
End: 2025-03-26
Payer: MEDICARE

## 2025-03-31 ENCOUNTER — APPOINTMENT (OUTPATIENT)
Dept: ENDOCRINOLOGY | Facility: CLINIC | Age: 75
End: 2025-03-31
Payer: MEDICARE

## 2025-03-31 VITALS
BODY MASS INDEX: 38.99 KG/M2 | WEIGHT: 242.6 LBS | HEART RATE: 78 BPM | SYSTOLIC BLOOD PRESSURE: 122 MMHG | RESPIRATION RATE: 16 BRPM | HEIGHT: 66 IN | DIASTOLIC BLOOD PRESSURE: 70 MMHG

## 2025-03-31 DIAGNOSIS — E03.9 HYPOTHYROIDISM, UNSPECIFIED TYPE: Primary | ICD-10-CM

## 2025-03-31 PROCEDURE — 1160F RVW MEDS BY RX/DR IN RCRD: CPT | Performed by: INTERNAL MEDICINE

## 2025-03-31 PROCEDURE — G2211 COMPLEX E/M VISIT ADD ON: HCPCS | Performed by: INTERNAL MEDICINE

## 2025-03-31 PROCEDURE — 1123F ACP DISCUSS/DSCN MKR DOCD: CPT | Performed by: INTERNAL MEDICINE

## 2025-03-31 PROCEDURE — 1159F MED LIST DOCD IN RCRD: CPT | Performed by: INTERNAL MEDICINE

## 2025-03-31 PROCEDURE — 3008F BODY MASS INDEX DOCD: CPT | Performed by: INTERNAL MEDICINE

## 2025-03-31 PROCEDURE — 1036F TOBACCO NON-USER: CPT | Performed by: INTERNAL MEDICINE

## 2025-03-31 PROCEDURE — 3078F DIAST BP <80 MM HG: CPT | Performed by: INTERNAL MEDICINE

## 2025-03-31 PROCEDURE — 99213 OFFICE O/P EST LOW 20 MIN: CPT | Performed by: INTERNAL MEDICINE

## 2025-03-31 PROCEDURE — 3074F SYST BP LT 130 MM HG: CPT | Performed by: INTERNAL MEDICINE

## 2025-03-31 RX ORDER — LEVOTHYROXINE SODIUM 125 UG/1
125 TABLET ORAL DAILY
Qty: 90 TABLET | Refills: 3 | Status: SHIPPED | OUTPATIENT
Start: 2025-03-31 | End: 2026-03-31

## 2025-03-31 ASSESSMENT — ENCOUNTER SYMPTOMS
PALPITATIONS: 0
CHILLS: 0
FEVER: 0
DIARRHEA: 0
FATIGUE: 0
VOMITING: 0
SHORTNESS OF BREATH: 0
COUGH: 0
HEADACHES: 0
NAUSEA: 0

## 2025-03-31 NOTE — PROGRESS NOTES
"Endocrinology: Follow up visit  Subjective   Patient ID: Gretta Wilks \"Juvenal" is a 75 y.o. female who presents for Hypothyroidism and Hashimoto's Thyroiditis.    PCP: Yarelis Blanco MD    HPI  Hypothyroidism s/p bloom for graves  Since last visit on biologics for psoriatic arthritis but not sure they are helping  Feeling ok but not great  Taking t4 as directed    Review of Systems   Constitutional:  Negative for chills, fatigue and fever.   Respiratory:  Negative for cough and shortness of breath.    Cardiovascular:  Negative for chest pain and palpitations.   Gastrointestinal:  Negative for diarrhea, nausea and vomiting.   Neurological:  Negative for headaches.       Patient Active Problem List   Diagnosis    Abnormal mammogram    Age-related nuclear cataract of both eyes    Allergic contact dermatitis due to other agents    Allergy    Angioedema    Anxiety    Edema of upper extremity    Leg swelling    Arthritis of carpometacarpal (CMC) joint of left thumb    Arthritis of knee, right    Atypical ductal hyperplasia of breast    Baker's cyst of knee    Carpal tunnel syndrome on left    Cellulitis    Cervicocranial syndrome    Chest pain    Chronic migraine without aura, intractable, without status migrainosus    Closed displaced spiral fracture of shaft of left humerus    Closed fracture of left distal humerus    Decreased ROM of left elbow    Decreased ROM of left shoulder    Dizziness    Vertigo    Dyspnea    Ear ringing    Tinnitus of left ear    Edema    Extrinsic asthma without complication (HHS-HCC)    Chronic fatigue syndrome    Fatigue    GERD (gastroesophageal reflux disease)    Hemangioma    History of lumpectomy of right breast    Hypothyroidism    Imbalance    Injury of hand, right    Lesion of brain    Migraine with aura    Migraine without aura    Localized primary osteoarthritis of right hand    Primary localized osteoarthrosis of shoulder region    Metatarsal stress fracture    Nail fungus    " Neuropathy of left radial nerve    Numbness and tingling in left hand    Obesity    Osteoporosis    Plantar fasciitis, left    PONV (postoperative nausea and vomiting)    Primary hypertension    Fracture of right humerus    Proximal humerus fracture    Rash    Ankle pain    Torn ACL    Urine frequency    Vitamin D deficiency    Asthma    Clawtoe, acquired    Phlebitis    Plantar plate injury    UTI symptoms    Vitreous detachment of right eye    Hand arthritis    Arthritis of knee, left    Carpal tunnel syndrome of left wrist    Feeling weak    Neck swelling    Chronic pain of right knee    Pain of left humerus    Migraine with aura and without status migrainosus, not intractable    Hypertension    Accidental fall    Ankylosis of left shoulder    Antinuclear antibody (EMILY) titer greater than 1:80    Autoimmune disease (Multi)    Autoimmune thyroiditis    Cervical spondylosis    History of pernicious anemia    Primary generalized hypertrophic osteoarthrosis    Primary osteoarthritis of knees, bilateral    Sjogren syndrome with keratoconjunctivitis (Multi)    Systemic lupus erythematosus (Multi)    Primary osteoarthritis of right knee    Primary osteoarthritis, left hand    Tenosynovitis of right hand    Fibromyalgia syndrome    Unsteady gait    Claudication    Cellulitis and abscess of lower extremity    Arm swelling    Class 2 obesity with body mass index (BMI) of 36.0 to 36.9 in adult    Medication management    Post-menopausal    Pes anserinus bursitis of left knee    Endocarditis and heart valve disorders in diseases classified elsewhere    Renal insufficiency    HAYS (dyspnea on exertion)    Acute infectious disease    Cough    Infection of ear    Fever    Headache    Flushing    Pain of foot    Pain of right lower extremity    Arthralgia of knee    Pain of left shoulder region    Pain of upper extremity    Overactive bladder    Urinary tract infection    Daily nausea    Neuropathic pain        Home  Meds:  Current Outpatient Medications   Medication Instructions    albuterol 90 mcg/actuation inhaler 2 puffs, inhalation, 3 times daily RT    Allergy Relief, fexofenadine, 180 mg tablet 2 tablets, Every 12 hours scheduled (0630,1830)    amLODIPine (NORVASC) 5 mg, oral, Daily    butalbital-acetaminophen-caff -40 mg tablet 2 tablets, oral, Daily PRN    Cosentyx Pen (2 Pens) 300 mg, Once Weekly    diazePAM (Valium) 5 mg tablet TAKE 1 TABLET BY MOUTH EVERY 12 HOURS FOR UP TO 7 DAYS AS NEEDED FOR VERTIGO    econazole nitrate 1 % cream Topical, 2 times daily PRN    EPINEPHrine (EPIPEN) 0.3 mg, intramuscular, As needed, Inject into upper leg. Call 911 after use.    EPINEPHrine (EPIPEN) 0.3 mg, intramuscular, Once as needed, Inject into upper leg. Call 911 after use.    famotidine (Pepcid) 40 mg tablet 1 tablet, Every 12 hours scheduled (0630,1830)    fluconazole (DIFLUCAN) 100 mg, oral, 2 times daily    fluocinolone (DermOtic) 0.01 % ear drops 3 drops in the left ear every day for 2 weeks and then every other day thereafter    fluticasone (Flonase Sensimist) 27.5 mcg/actuation nasal spray 1 spray, Each Nostril, Daily RT    fluticasone furoate-vilanteroL (Breo Ellipta) 200-25 mcg/dose inhaler 1 puff, inhalation, Daily RT    gabapentin (NEURONTIN) 300 mg, oral, 3 times daily    ipratropium-albuteroL (Duo-Neb) 0.5-2.5 mg/3 mL nebulizer solution 3 mL, nebulization, 3 times daily PRN    loratadine (CLARITIN) 10 mg, oral, Daily    meclizine (ANTIVERT) 25 mg, oral, 3 times daily    metoprolol succinate XL (TOPROL-XL) 25 mg, oral, Daily, Do not crush or chew.    montelukast (SINGULAIR) 10 mg, oral, Nightly    nebulizer accessories misc 3 times daily    omalizumab (Xolair) 150 mg injection Every 28 days    ondansetron (ZOFRAN) 4 mg, oral, Every 8 hours PRN    ondansetron ODT (ZOFRAN-ODT) 4 mg, oral, Every 8 hours PRN    pantoprazole (PROTONIX) 40 mg, oral, Daily    potassium chloride CR (Klor-Con M20) 20 mEq ER tablet 20  mEq, oral, 2 times daily, Do not crush or chew.    predniSONE (Deltasone) 20 mg tablet 1 tablet, Daily (0630)    Synthroid 125 mcg, oral, Daily        Allergies   Allergen Reactions    Formaldehyde Anaphylaxis    Cefepime Rash    Colchicine Swelling    Hydromorphone Nausea/vomiting    Indomethacin Swelling    Penicillins Hives    Sulfa (Sulfonamide Antibiotics) Hives and Other    Methotrexate Nausea Only    Other Unknown     Allergy: Cardiac Monitor Leads, Reaction: Unknown  Allergy: Fiberglass, Reaction: Moderate/Swelling  Allergy: Resins/Plastics, Reaction: Severe/Anaphylaxis    No Known Food Allergies    Ubrelvy [Ubrogepant] Unknown    Prochlorperazine Unknown        Objective   Vitals:    03/31/25 1144   BP: 122/70   Pulse: 78   Resp: 16      Vitals:    03/31/25 1144   Weight: 110 kg (242 lb 9.6 oz)      Body mass index is 39.16 kg/m².   Physical Exam  Constitutional:       Appearance: Normal appearance. She is overweight.   HENT:      Head: Normocephalic and atraumatic.   Neck:      Thyroid: No thyroid mass, thyromegaly or thyroid tenderness.   Cardiovascular:      Rate and Rhythm: Normal rate and regular rhythm.      Heart sounds: No murmur heard.     No gallop.   Pulmonary:      Effort: Pulmonary effort is normal.      Breath sounds: Normal breath sounds.   Abdominal:      Palpations: Abdomen is soft.      Comments: benign   Neurological:      General: No focal deficit present.      Mental Status: She is alert and oriented to person, place, and time.      Deep Tendon Reflexes: Reflexes are normal and symmetric.   Psychiatric:         Behavior: Behavior is cooperative.         Labs:  Lab Results   Component Value Date    HGBA1C 5.9 (H) 02/05/2025    TSH 1.48 02/05/2025    FREET4 1.46 06/07/2018      Lab Results   Component Value Date    THYROIDPAB <28 03/06/2024        Assessment/Plan   Assessment & Plan  Hypothyroidism, unspecified type    Labs discussed   Tsh normal  Continue current t4 dose  Follow up in  one year      Electronically signed by:  Inge Humphries MD 03/31/25 12:00 PM

## 2025-04-02 ENCOUNTER — HOSPITAL ENCOUNTER (OUTPATIENT)
Dept: RADIOLOGY | Facility: CLINIC | Age: 75
Discharge: HOME | End: 2025-04-02
Payer: MEDICARE

## 2025-04-02 ENCOUNTER — NURSE TRIAGE (OUTPATIENT)
Dept: AUDIOLOGY | Facility: CLINIC | Age: 75
End: 2025-04-02
Payer: MEDICARE

## 2025-04-02 ENCOUNTER — OFFICE VISIT (OUTPATIENT)
Dept: ORTHOPEDIC SURGERY | Facility: CLINIC | Age: 75
End: 2025-04-02
Payer: MEDICARE

## 2025-04-02 DIAGNOSIS — M25.562 CHRONIC PAIN OF LEFT KNEE: ICD-10-CM

## 2025-04-02 DIAGNOSIS — G89.29 CHRONIC PAIN OF LEFT KNEE: ICD-10-CM

## 2025-04-02 DIAGNOSIS — M17.12 ARTHRITIS OF LEFT KNEE: Primary | ICD-10-CM

## 2025-04-02 PROCEDURE — 73564 X-RAY EXAM KNEE 4 OR MORE: CPT | Mod: LT

## 2025-04-02 PROCEDURE — 1123F ACP DISCUSS/DSCN MKR DOCD: CPT

## 2025-04-02 PROCEDURE — 1160F RVW MEDS BY RX/DR IN RCRD: CPT

## 2025-04-02 PROCEDURE — 73564 X-RAY EXAM KNEE 4 OR MORE: CPT | Mod: LEFT SIDE | Performed by: RADIOLOGY

## 2025-04-02 PROCEDURE — 99214 OFFICE O/P EST MOD 30 MIN: CPT | Mod: 25

## 2025-04-02 PROCEDURE — 1159F MED LIST DOCD IN RCRD: CPT

## 2025-04-02 PROCEDURE — 1125F AMNT PAIN NOTED PAIN PRSNT: CPT

## 2025-04-02 PROCEDURE — 1036F TOBACCO NON-USER: CPT

## 2025-04-02 PROCEDURE — 99214 OFFICE O/P EST MOD 30 MIN: CPT

## 2025-04-02 ASSESSMENT — PAIN SCALES - GENERAL: PAINLEVEL_OUTOF10: 8

## 2025-04-02 ASSESSMENT — PAIN - FUNCTIONAL ASSESSMENT: PAIN_FUNCTIONAL_ASSESSMENT: 0-10

## 2025-04-02 NOTE — TELEPHONE ENCOUNTER
[facility] Call notes copied by Nikki Castro on Wednesday April 02, 2025  9:29 AM  ------  Documentation by Nikki Castro. [[id number]] 3/31/2025  3:23 PM  Sees Dr. Pickett issues with ears plugging up infections, ear closed again dizziness. Could she come in to flush

## 2025-04-02 NOTE — PROGRESS NOTES
Left shot right knee doing well  This is a consultation from Dr. Yarelis Blanco MD for   Chief Complaint   Patient presents with    Left Knee - Pain       This is a 75 y.o. female who presents for evaluation of left knee pain.  Patient states she has had exacerbations of pain in her left knee and has not had a steroid injection in her knee in quite some time.  Patient states that she feels pain with long periods of walking and going up and down stairs.  Patient states that she rests and ice her knee when it becomes sore.  States sometimes takes over-the-counter medication that helps a little bit.  Patient denies numbness tingling or distal extremities.  Patient denies fevers chills.    Physical Exam    There has been no interval change in this patient's past medical, surgical, medications, allergies, family history or social history since the most recent visit to a provider within our department. 14 point review of systems was performed, reviewed, and negative except for pertinent positives documented in the history of present illness.     Constitutional: well developed, well nourished female in no acute distress  Psychiatric: normal mood, appropriate affect  Eyes: sclera anicteric  HENT: normocephalic/atraumatic  CV: regular rate and rhythm   Respiratory: non labored breathing  Integumentary: no rash  Neurological: moves all extremities    Left knee exam: skin intact no lacerations or abrations. no effusion. nttp joint line. negative log roll negative patellar grind. ROM 0-120. stable to varus and valgus stress at 0 and 30 degrees. negative lachman negative posterior drawer negative yamil. 5/5 ehl/fhl/gs/ta. silt s/s/sp/dp/t. 2+ dp/pt        Imaging:  Xrays were ordered by me, they were reviewed and independently interpreted by me today, they show severe joint space degeneration especially in the medial compartment of the left knee.  Right knee shows stable total knee arthroplasty without any presence of  loosening fracture dislocation.    L Inj/Asp: L knee on 4/3/2025 10:19 PM  Indications: pain and joint swelling  Details: 22 G needle, anterolateral approach  Medications: 1 mL triamcinolone acetonide 40 mg/mL    Discussion:  I discussed the conservative treatment options for knee osteoarthritis including but not limited to physical therapy, oral NSAIDS, activity and lifestyle modification, and corticosteroid injections. Pt has elected to undergo a cortisone injection today. I have explained the risk and benefits of an injection including the possibility of joint infection, bleeding, damage to cartilage, allergic reaction. Patient verbalized understanding and gave verbal consent wishes to proceed with a intra-articular cortisone injection for their knee.    Procedure:  After discussing the risk and benefits of the procedure, we proceeded with an intra-articular left knee injection. We discussed the risks and benefits and potential morbidity related to the treatment, and to the prescription medication administered in the injection    With the patient's informed verbal consent, the left knee was prepped in standard sterile fashion with Chlorhexidine. The skin was then anesthetized with ethyl chloride spray and cleaned again with Chlorhexidine. The knee was then apirated/injected with a prefilled 20-gauge syringe of 40 mg Kenalog + 4 ml Lidocaine using the lateral approach without complications.  The patient tolerated this well and felt immediate initial relief of symptoms. A bandaid was applied and the patient ambulated out of the clinic on ther own accord without difficulty. Patient was instructed to avoid physical activity for 24-48 hours to prevent the knees from swelling and may ice the knees as tolerated. Patient should contact the office if any signs of of infection appear: redness, fever, chills, drainage, swelling or warmth to the knees.  Pt understands that the injections can be repeated no sooner than 3  "months.  Procedure, treatment alternatives, risks and benefits explained, specific risks discussed. Consent was given by the patient. Immediately prior to procedure a time out was called to verify the correct patient, procedure, equipment, support staff and site/side marked as required. Patient was prepped and draped in the usual sterile fashion.             Impression/Plan: This is a 75 y.o. female with exacerbations of pain in her left knee.  I had an in depth discussion with the patient regarding treatment options for arthritis and their relative risks and benefits. We reviewed surgical and nonsurgical option for treatment. Treatments include anti inflammatory medications, physical therapy, weight loss, activity modification, use of assistive devices, injection therapies. We discussed current prescriptions and risks and benefits of continuation of prescription medication as apporpriate. We discussed that arthritis is often progressive over time, an in end stage arthritis surgical interventions can be considered, including arthroplasty. All questions were answered and the patient voiced their understanding.  We did a steroid injection in her left knee today and she is welcome back in 3 months or more for another round.    BMI Readings from Last 1 Encounters:   03/31/25 39.16 kg/m²      Lab Results   Component Value Date    CREATININE 1.35 (H) 02/05/2025     Tobacco Use: Low Risk  (4/2/2025)    Patient History     Smoking Tobacco Use: Never     Smokeless Tobacco Use: Never     Passive Exposure: Never      Computed MELD 3.0 unavailable. One or more values for this score either were not found within the given timeframe or did not fit some other criterion.  Computed MELD-Na unavailable. One or more values for this score either were not found within the given timeframe or did not fit some other criterion.       Lab Results   Component Value Date    HGBA1C 5.9 (H) 02/05/2025     No results found for: \"STAPHMRSASCR\"  "

## 2025-04-03 PROCEDURE — 20610 DRAIN/INJ JOINT/BURSA W/O US: CPT

## 2025-04-03 RX ORDER — TRIAMCINOLONE ACETONIDE 40 MG/ML
1 INJECTION, SUSPENSION INTRA-ARTICULAR; INTRAMUSCULAR
Status: COMPLETED | OUTPATIENT
Start: 2025-04-03 | End: 2025-04-03

## 2025-04-03 RX ADMIN — TRIAMCINOLONE ACETONIDE 1 ML: 40 INJECTION, SUSPENSION INTRA-ARTICULAR; INTRAMUSCULAR at 22:19

## 2025-04-07 ENCOUNTER — APPOINTMENT (OUTPATIENT)
Dept: OTOLARYNGOLOGY | Facility: CLINIC | Age: 75
End: 2025-04-07
Payer: MEDICARE

## 2025-04-07 VITALS — WEIGHT: 238 LBS | BODY MASS INDEX: 38.25 KG/M2 | HEIGHT: 66 IN

## 2025-04-07 DIAGNOSIS — H92.10 OTORRHEA, UNSPECIFIED LATERALITY: ICD-10-CM

## 2025-04-07 DIAGNOSIS — H61.22 IMPACTED CERUMEN OF LEFT EAR: Primary | ICD-10-CM

## 2025-04-07 DIAGNOSIS — H60.543 ECZEMA OF BOTH EXTERNAL EARS: ICD-10-CM

## 2025-04-07 PROCEDURE — 1036F TOBACCO NON-USER: CPT | Performed by: OTOLARYNGOLOGY

## 2025-04-07 PROCEDURE — 3008F BODY MASS INDEX DOCD: CPT | Performed by: OTOLARYNGOLOGY

## 2025-04-07 PROCEDURE — 1159F MED LIST DOCD IN RCRD: CPT | Performed by: OTOLARYNGOLOGY

## 2025-04-07 PROCEDURE — 1123F ACP DISCUSS/DSCN MKR DOCD: CPT | Performed by: OTOLARYNGOLOGY

## 2025-04-07 PROCEDURE — 99214 OFFICE O/P EST MOD 30 MIN: CPT | Performed by: OTOLARYNGOLOGY

## 2025-04-07 RX ORDER — CIPROFLOXACIN AND DEXAMETHASONE 3; 1 MG/ML; MG/ML
SUSPENSION/ DROPS AURICULAR (OTIC)
Qty: 7.5 ML | Refills: 2 | Status: SHIPPED | OUTPATIENT
Start: 2025-04-07 | End: 2025-04-12

## 2025-04-07 NOTE — PROGRESS NOTES
Chief Complaint   Patient presents with    Follow-up     EP- 8 WK EAR CK      HPI:  Her left ear feels plugged again with decreased hearing.  She has eczema of the ear canal and has been using Derm otic every other day.  The left ear feels plugged up again    Her angioedema is doing somewhat better on Xolair.  She has dizziness related to a head injury she suffered in 2019 with significant concussion.  The left ear has felt plugged.  She was found to have cerumen impaction and possible otitis externa and is referred for cleaning out the ear.  She is not hearing well from the left ear    Recall from October 2024: She is here for additional opinion on recurrent angioedema.  She is following with Dr. Mcnally allergy/immunology.  She is having recurrent angioedema involving the entire tongue.  She had 4 episodes last week.  She wanted to make sure there is no additional problem with the tongue.  She is having nasal congestion  Recall from Dr. Reyes March 2024:  Gretta Wilks is a 75 y.o. female who presents today for evaluation of her larynx.  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  She treats herself with Benadryl, steroids, and rarely an EpiPen.  She also does see an allergy/immunologist and has had extensive workup both here and away for this problem.  She was recommended to follow-up with ENT to get visualization of the larynx for some hoarseness which has been present since about August.  She reports having a chemical exposure then which did trigger some recurrent angioedema.  No current shortness of breath or stridor.  No sore throat or dysphagia.  She continues to have some left-sided tinnitus after concussion last fall    PMH:  Past Medical History:   Diagnosis Date    Abnormal findings on diagnostic imaging of skull and head, not elsewhere classified 11/26/2019    Abnormal MRI of head    Angioedema     Arthritis     Atypical  ductal hyperplasia, breast     Body mass index (BMI) 38.0-38.9, adult     BMI 38.0-38.9,adult    Headache, unspecified 09/18/2019    Headache    High cholesterol     History of angioedema     Hypertension     Hypothyroidism     Low back pain, unspecified 06/25/2015    Acute low back pain due to trauma    Migraine with aura, not intractable, without status migrainosus 12/12/2022    Migraine with aura and without status migrainosus, not intractable    Nausea 10/22/2015    Nausea    Neuropathic pain     Obesity     Pain in unspecified hip 08/22/2016    Hip pain    Personal history of other diseases of the digestive system 08/14/2013    History of gastroenteritis    Personal history of other diseases of the respiratory system 11/17/2014    History of acute sinusitis    Personal history of other diseases of the respiratory system 07/27/2015    History of acute bronchitis    Personal history of other diseases of urinary system 10/10/2013    History of hematuria    Personal history of other infectious and parasitic diseases 09/30/2015    History of athlete's foot    Personal history of other infectious and parasitic diseases 11/04/2015    History of candidiasis of mouth    Personal history of other specified conditions 11/30/2016    History of abdominal pain    Personal history of other specified conditions 02/06/2014    History of insomnia    Personal history of other specified conditions 10/07/2013    History of abdominal pain    Personal history of other specified conditions 10/10/2013    History of urinary frequency    Personal history of other specified conditions 10/22/2013    History of dizziness    Personal history of other specified conditions 10/22/2013    History of syncope    Postmenopausal      Past Surgical History:   Procedure Laterality Date    BREAST LUMPECTOMY      KNEE SURGERY  09/23/2013    Knee Surgery    MR HEAD ANGIO WO IV CONTRAST  09/08/2021    MR HEAD ANGIO WO IV CONTRAST [facility]     OTHER SURGICAL HISTORY  09/23/2013    Biopsy Tongue    OTHER SURGICAL HISTORY  09/23/2013    Gynecologic Laparoscopy With Adhesiolysis    TONSILLECTOMY  02/09/2015    Tonsillectomy         Medications:     Current Outpatient Medications:     albuterol 90 mcg/actuation inhaler, Inhale 2 puffs 3 times a day., Disp: 18 g, Rfl: 3    Allergy Relief, fexofenadine, 180 mg tablet, Take 2 tablets (360 mg) by mouth every 12 hours., Disp: , Rfl:     amLODIPine (Norvasc) 5 mg tablet, Take 1 tablet (5 mg) by mouth once daily., Disp: 90 tablet, Rfl: 3    butalbital-acetaminophen-caff -40 mg tablet, Take 2 tablets by mouth once daily as needed for headaches or migraine., Disp: 20 tablet, Rfl: 0    diazePAM (Valium) 5 mg tablet, TAKE 1 TABLET BY MOUTH EVERY 12 HOURS FOR UP TO 7 DAYS AS NEEDED FOR VERTIGO, Disp: , Rfl:     econazole nitrate 1 % cream, Apply topically 2 times a day as needed for irritation or rash., Disp: 300 g, Rfl: 0    EPINEPHrine (Epipen) 0.3 mg/0.3 mL injection syringe, Inject 0.3 mL (0.3 mg) into the muscle 1 time if needed for anaphylaxis for up to 1 dose. Inject into upper leg. Call 911 after use., Disp: 1 each, Rfl: 0    EPINEPHrine 0.3 mg/0.3 mL injection syringe, Inject 0.3 mL (0.3 mg) into the muscle if needed for anaphylaxis. Inject into upper leg. Call 911 after use., Disp: 1 mL, Rfl: 0    famotidine (Pepcid) 40 mg tablet, Take 1 tablet (40 mg) by mouth every 12 hours., Disp: , Rfl:     fluocinolone (DermOtic) 0.01 % ear drops, 3 drops in the left ear every day for 2 weeks and then every other day thereafter, Disp: 20 mL, Rfl: 3    fluticasone (Flonase Sensimist) 27.5 mcg/actuation nasal spray, Administer 1 spray into each nostril once daily., Disp: 10 g, Rfl: 5    fluticasone furoate-vilanteroL (Breo Ellipta) 200-25 mcg/dose inhaler, Inhale 1 puff once daily., Disp: 1 each, Rfl: 0    gabapentin (Neurontin) 300 mg capsule, Take 1 capsule (300 mg) by mouth 3 times a day., Disp: 90 capsule, Rfl: 2     ipratropium-albuteroL (Duo-Neb) 0.5-2.5 mg/3 mL nebulizer solution, Take 3 mL by nebulization 3 times a day as needed for wheezing., Disp: 180 mL, Rfl: 11    loratadine (Claritin) 10 mg tablet, Take 1 tablet (10 mg) by mouth once daily., Disp: 30 tablet, Rfl: 2    meclizine (Antivert) 25 mg tablet, Take 1 tablet (25 mg) by mouth 3 times a day., Disp: 30 tablet, Rfl: 3    metoprolol succinate XL (Toprol-XL) 25 mg 24 hr tablet, Take 1 tablet (25 mg) by mouth once daily. Do not crush or chew., Disp: 90 tablet, Rfl: 3    nebulizer accessories misc, 3 times daily, Disp: 100 each, Rfl: 5    omalizumab (Xolair) 150 mg injection, Inject under the skin every 28 (twenty-eight) days., Disp: , Rfl:     ondansetron (Zofran) 4 mg tablet, Take 1 tablet (4 mg) by mouth every 8 hours if needed for nausea or vomiting., Disp: 60 tablet, Rfl: 2    ondansetron ODT (Zofran-ODT) 4 mg disintegrating tablet, Dissolve 1 tablet (4 mg) in the mouth every 8 hours if needed for nausea or vomiting., Disp: 60 tablet, Rfl: 2    pantoprazole (ProtoNix) 40 mg EC tablet, Take 1 tablet (40 mg) by mouth once daily., Disp: 90 tablet, Rfl: 1    potassium chloride CR (Klor-Con M20) 20 mEq ER tablet, Take 1 tablet (20 mEq) by mouth 2 times a day. Do not crush or chew., Disp: 90 tablet, Rfl: 1    predniSONE (Deltasone) 20 mg tablet, Take 1 tablet (20 mg) by mouth early in the morning.., Disp: , Rfl:     secukinumab (Cosentyx Pen, 2 Pens,) 150 mg/mL self-injector pen, Inject 2 mL (300 mg) under the skin 1 (one) time per week., Disp: , Rfl:     Synthroid 125 mcg tablet, Take 1 tablet (125 mcg) by mouth once daily., Disp: 90 tablet, Rfl: 3    ciprofloxacin-dexamethasone (CiproDEX) otic suspension, Administer 4 drops twice daily to affected ear for 7 days, Disp: 7.5 mL, Rfl: 2    montelukast (Singulair) 10 mg tablet, Take 1 tablet (10 mg) by mouth once daily at bedtime., Disp: 30 tablet, Rfl: 3     Allergies:  Allergies   Allergen Reactions    Formaldehyde  "Anaphylaxis    Cefepime Rash    Colchicine Swelling    Hydromorphone Nausea/vomiting    Indomethacin Swelling    Penicillins Hives    Sulfa (Sulfonamide Antibiotics) Hives and Other    Methotrexate Nausea Only    Other Unknown     Allergy: Cardiac Monitor Leads, Reaction: Unknown  Allergy: Fiberglass, Reaction: Moderate/Swelling  Allergy: Resins/Plastics, Reaction: Severe/Anaphylaxis    No Known Food Allergies    Ubrelvy [Ubrogepant] Unknown    Prochlorperazine Unknown        ROS:  Review of systems normal unless stated otherwise in the HPI and/or PMH.    Physical Exam:  Height 1.676 m (5' 6\"), weight 108 kg (238 lb), not currently breastfeeding. Body mass index is 38.41 kg/m².     GENERAL APPEARANCE: Well developed and well nourished.  Alert and oriented in no acute distress.  Normal vocal quality.      HEAD/FACE: No erythema or edema or facial tenderness.  Normal facial nerve function bilaterally.    EAR:       EXTERNAL: Normal pinnas and external auditory canals left significant eczematous impaction and dried skin.  This was debrided under the microscope.  Mild otitis externa       MIDDLE EAR: Tympanic membranes intact and mobile with normal landmarks.  Middle ear space appears well aerated.       TUBE STATUS: N/A       MASTOID CAVITY: N/A       HEARING: Gross hearing assessment is within normal limits.      NOSE:       VISUALIZED USING: Anterior rhinoscopy with headlight and nasal speculum.       DORSUM: Midline, nontraumatic appearance.       MUCOSA: Normal-appearing.       SECRETIONS: Normal.       SEPTUM: Midline and nonobstructing.       INFERIOR TURBINATES: Normal.       MIDDLE TURBINATES/MEATUS: N/A       BLEEDING: N/A         ORAL CAVITY/PHARYNX:       TEETH: Adequate dentition.       TONGUE: No mass or lesion.  Normal mobility.       FLOOR OF MOUTH: No mass or lesion.       PALATE: Normal hard palate, soft palate, and uvula.       OROPHARYNX: Normal without mass or lesion.  Tonsils absent       BUCCAL " "MUCOSA/GBS: Normal without mass or lesion.       LIPS: Normal.        NECK: No palpable masses or abnormal adenopathy.  Trachea is midline.    THYROID: No thyromegaly or palpable nodule.    SALIVARY GLANDS: Normal bilateral parotid and submandibular glands by inspection and palpation.    TMJ's: Normal.    NEURO: Cranial nerve exam grossly normal bilaterally.       Assessment/Plan   Gretta \"Dixie\" was seen today for follow-up.  Diagnoses and all orders for this visit:  Impacted cerumen of left ear (Primary)  Eczema of both external ears  Otorrhea, unspecified laterality  -     ciprofloxacin-dexamethasone (CiproDEX) otic suspension; Administer 4 drops twice daily to affected ear for 7 days             Ciprodex in the left ear for 1 week then I will have her use Derm otic daily  every other day thereafter.  Follow-up in 6 weeks    Sim Pickett MD    "

## 2025-04-14 ENCOUNTER — TELEPHONE (OUTPATIENT)
Dept: OTOLARYNGOLOGY | Facility: CLINIC | Age: 75
End: 2025-04-14
Payer: MEDICARE

## 2025-04-14 ENCOUNTER — TELEMEDICINE (OUTPATIENT)
Dept: PRIMARY CARE | Facility: CLINIC | Age: 75
End: 2025-04-14
Payer: MEDICARE

## 2025-04-14 DIAGNOSIS — T78.40XA ALLERGY, INITIAL ENCOUNTER: ICD-10-CM

## 2025-04-14 DIAGNOSIS — J06.9 ACUTE URI: Primary | ICD-10-CM

## 2025-04-14 DIAGNOSIS — H66.90 EAR INFECTION: ICD-10-CM

## 2025-04-14 DIAGNOSIS — R05.9 COUGH, UNSPECIFIED TYPE: ICD-10-CM

## 2025-04-14 DIAGNOSIS — J45.909 ACUTE ASTHMATIC BRONCHITIS (HHS-HCC): Primary | ICD-10-CM

## 2025-04-14 PROCEDURE — 1123F ACP DISCUSS/DSCN MKR DOCD: CPT | Performed by: INTERNAL MEDICINE

## 2025-04-14 PROCEDURE — 99213 OFFICE O/P EST LOW 20 MIN: CPT | Performed by: INTERNAL MEDICINE

## 2025-04-14 RX ORDER — AZITHROMYCIN 250 MG/1
TABLET, FILM COATED ORAL
Qty: 6 TABLET | Refills: 0 | Status: SHIPPED | OUTPATIENT
Start: 2025-04-14

## 2025-04-14 RX ORDER — GUAIFENESIN 100 MG/5ML
200 LIQUID ORAL 3 TIMES DAILY PRN
Qty: 120 ML | Refills: 0 | Status: SHIPPED | OUTPATIENT
Start: 2025-04-14 | End: 2025-04-24

## 2025-04-14 RX ORDER — ALBUTEROL SULFATE 90 UG/1
2 INHALANT RESPIRATORY (INHALATION)
Qty: 18 G | Refills: 3 | Status: SHIPPED | OUTPATIENT
Start: 2025-04-14

## 2025-04-14 RX ORDER — DOXYCYCLINE 100 MG/1
100 CAPSULE ORAL 2 TIMES DAILY
Qty: 20 CAPSULE | Refills: 0 | Status: SHIPPED | OUTPATIENT
Start: 2025-04-14 | End: 2025-04-24

## 2025-04-14 RX ORDER — LORATADINE 10 MG/1
10 TABLET ORAL DAILY
Qty: 30 TABLET | Refills: 0 | Status: SHIPPED | OUTPATIENT
Start: 2025-04-14 | End: 2025-07-13

## 2025-04-14 NOTE — TELEPHONE ENCOUNTER
Pt c/o she never got the drops that were rx'd due to an allergy she has. So she has not used anything. She calls to say she now has a sinus infection-sinus pain, yellow drainage since Wednesday. She didn't call sooner due to her  having surgery.     [facility] conf  [phone]

## 2025-04-16 ENCOUNTER — APPOINTMENT (OUTPATIENT)
Dept: NEUROLOGY | Facility: CLINIC | Age: 75
End: 2025-04-16
Payer: COMMERCIAL

## 2025-04-28 ENCOUNTER — APPOINTMENT (OUTPATIENT)
Dept: NEUROLOGY | Facility: CLINIC | Age: 75
End: 2025-04-28
Payer: MEDICARE

## 2025-04-28 VITALS
DIASTOLIC BLOOD PRESSURE: 74 MMHG | SYSTOLIC BLOOD PRESSURE: 130 MMHG | BODY MASS INDEX: 37.12 KG/M2 | WEIGHT: 231 LBS | HEIGHT: 66 IN | HEART RATE: 62 BPM

## 2025-04-28 DIAGNOSIS — G43.009 MIGRAINE WITHOUT AURA AND WITHOUT STATUS MIGRAINOSUS, NOT INTRACTABLE: ICD-10-CM

## 2025-04-28 DIAGNOSIS — R11.0 DAILY NAUSEA: ICD-10-CM

## 2025-04-28 DIAGNOSIS — G43.109 MIGRAINE WITH AURA AND WITHOUT STATUS MIGRAINOSUS, NOT INTRACTABLE: ICD-10-CM

## 2025-04-28 DIAGNOSIS — G62.9 NEUROPATHY: ICD-10-CM

## 2025-04-28 DIAGNOSIS — M79.2 NEUROPATHIC PAIN: ICD-10-CM

## 2025-04-28 DIAGNOSIS — R44.2 OLFACTORY HALLUCINATIONS: Primary | ICD-10-CM

## 2025-04-28 PROCEDURE — 1123F ACP DISCUSS/DSCN MKR DOCD: CPT | Performed by: PSYCHIATRY & NEUROLOGY

## 2025-04-28 PROCEDURE — 1036F TOBACCO NON-USER: CPT | Performed by: PSYCHIATRY & NEUROLOGY

## 2025-04-28 PROCEDURE — 99215 OFFICE O/P EST HI 40 MIN: CPT | Performed by: PSYCHIATRY & NEUROLOGY

## 2025-04-28 PROCEDURE — 3078F DIAST BP <80 MM HG: CPT | Performed by: PSYCHIATRY & NEUROLOGY

## 2025-04-28 PROCEDURE — 3008F BODY MASS INDEX DOCD: CPT | Performed by: PSYCHIATRY & NEUROLOGY

## 2025-04-28 PROCEDURE — 3075F SYST BP GE 130 - 139MM HG: CPT | Performed by: PSYCHIATRY & NEUROLOGY

## 2025-04-28 PROCEDURE — 1159F MED LIST DOCD IN RCRD: CPT | Performed by: PSYCHIATRY & NEUROLOGY

## 2025-04-28 RX ORDER — BUTALBITAL, ACETAMINOPHEN AND CAFFEINE 50; 325; 40 MG/1; MG/1; MG/1
2 TABLET ORAL DAILY PRN
Qty: 20 TABLET | Refills: 0 | Status: SHIPPED | OUTPATIENT
Start: 2025-04-28

## 2025-04-28 RX ORDER — GABAPENTIN 300 MG/1
300 CAPSULE ORAL 3 TIMES DAILY
Qty: 90 CAPSULE | Refills: 2 | Status: SHIPPED | OUTPATIENT
Start: 2025-04-28

## 2025-04-28 RX ORDER — ONDANSETRON 4 MG/1
4 TABLET, ORALLY DISINTEGRATING ORAL EVERY 8 HOURS PRN
Qty: 60 TABLET | Refills: 2 | Status: SHIPPED | OUTPATIENT
Start: 2025-04-28

## 2025-04-28 NOTE — PROGRESS NOTES
"Subjective   Dixie Wilks is a 75 y.o. right-handed female.  HPI  This is a 75 year old woman last seen 6 months ago.  She is having olfactory hallucinations- smells \"grapefruit\" lasting 10-20 minutes, followed by a migraine.  She has migraines about 5 per week, can last more than one day.  She has chronic left arm pain related a fall, and had left ulnar transposition.  She had a head injury, left sided ear ringing.  She had one set of Botox injections through [facility] for migraines.  Objective   Neurological Exam  Physical Exam  I personally reviewed laboratory, radiographic, and medical studies which were pertinent for nothing.    Assessment/Plan     "

## 2025-04-28 NOTE — PATIENT INSTRUCTIONS
You may have a small brain tumor causing the olfactory hallucinations, or they may proceed  migraine headaches.  Alternatively, you could be having partial seizures- i.e. temporal lobe seizures.  Please continue your current medications, schedule the MRI of the brain and EEG, I will update you on the results.  Try the occipital nerve block with additional Botox injections.  Follow up in 6 months.

## 2025-05-02 ENCOUNTER — OFFICE VISIT (OUTPATIENT)
Dept: PRIMARY CARE | Facility: CLINIC | Age: 75
End: 2025-05-02
Payer: MEDICARE

## 2025-05-02 VITALS
SYSTOLIC BLOOD PRESSURE: 142 MMHG | HEIGHT: 66 IN | WEIGHT: 236 LBS | BODY MASS INDEX: 37.93 KG/M2 | DIASTOLIC BLOOD PRESSURE: 70 MMHG

## 2025-05-02 DIAGNOSIS — N18.31 CHRONIC KIDNEY DISEASE, STAGE 3A (MULTI): ICD-10-CM

## 2025-05-02 DIAGNOSIS — L97.909 VENOUS STASIS ULCER, UNSPECIFIED SITE, UNSPECIFIED ULCER STAGE, UNSPECIFIED WHETHER VARICOSE VEINS PRESENT (MULTI): ICD-10-CM

## 2025-05-02 DIAGNOSIS — J45.909 ACUTE ASTHMATIC BRONCHITIS (HHS-HCC): ICD-10-CM

## 2025-05-02 DIAGNOSIS — M45.0 ANKYLOSING SPONDYLITIS OF MULTIPLE SITES IN SPINE (MULTI): ICD-10-CM

## 2025-05-02 DIAGNOSIS — D32.9 BENIGN NEOPLASM OF MENINGES, UNSPECIFIED: ICD-10-CM

## 2025-05-02 DIAGNOSIS — I50.9 EDEMA DUE TO CONGESTIVE HEART FAILURE: ICD-10-CM

## 2025-05-02 DIAGNOSIS — M45.9 ANKYLOSING SPONDYLITIS, UNSPECIFIED SITE OF SPINE (MULTI): ICD-10-CM

## 2025-05-02 DIAGNOSIS — R05.9 COUGH, UNSPECIFIED TYPE: Primary | ICD-10-CM

## 2025-05-02 DIAGNOSIS — E66.3 OVERWEIGHT: ICD-10-CM

## 2025-05-02 DIAGNOSIS — N18.30 STAGE 3 CHRONIC KIDNEY DISEASE, UNSPECIFIED WHETHER STAGE 3A OR 3B CKD (MULTI): ICD-10-CM

## 2025-05-02 DIAGNOSIS — E66.01 OBESITY, MORBID (MULTI): ICD-10-CM

## 2025-05-02 DIAGNOSIS — L97.901: ICD-10-CM

## 2025-05-02 DIAGNOSIS — R60.9 EDEMA, UNSPECIFIED TYPE: ICD-10-CM

## 2025-05-02 DIAGNOSIS — I83.009 VENOUS STASIS ULCER, UNSPECIFIED SITE, UNSPECIFIED ULCER STAGE, UNSPECIFIED WHETHER VARICOSE VEINS PRESENT (MULTI): ICD-10-CM

## 2025-05-02 PROCEDURE — 3078F DIAST BP <80 MM HG: CPT | Performed by: INTERNAL MEDICINE

## 2025-05-02 PROCEDURE — 3077F SYST BP >= 140 MM HG: CPT | Performed by: INTERNAL MEDICINE

## 2025-05-02 PROCEDURE — 3008F BODY MASS INDEX DOCD: CPT | Performed by: INTERNAL MEDICINE

## 2025-05-02 PROCEDURE — 1159F MED LIST DOCD IN RCRD: CPT | Performed by: INTERNAL MEDICINE

## 2025-05-02 PROCEDURE — 99214 OFFICE O/P EST MOD 30 MIN: CPT | Performed by: INTERNAL MEDICINE

## 2025-05-02 PROCEDURE — 1123F ACP DISCUSS/DSCN MKR DOCD: CPT | Performed by: INTERNAL MEDICINE

## 2025-05-02 RX ORDER — DOXYCYCLINE 100 MG/1
100 CAPSULE ORAL 2 TIMES DAILY
Qty: 20 CAPSULE | Refills: 0 | Status: SHIPPED | OUTPATIENT
Start: 2025-05-02 | End: 2025-05-12

## 2025-05-02 RX ORDER — PREDNISONE 5 MG/1
TABLET ORAL
Qty: 30 TABLET | Refills: 0 | Status: SHIPPED | OUTPATIENT
Start: 2025-05-02 | End: 2025-05-16

## 2025-05-02 NOTE — PROGRESS NOTES
"Subjective   Patient ID: Dixie Wilks is a 75 y.o. female who presents for Follow-up (Various conditions).    Ms. Glynn today came here for cough, yellow sputum, sinus congestion, bronchitis, headache ______ lung function test.  She is waiting to see lung doctor.  Her bronchitis has flared up.  Lung function done.    I have personally reviewed the patient's Past Medical History, Medications, Allergies, Social History, and Family History in the EMR.    Review of Systems   All other systems reviewed and are negative.    Objective   /70   Ht 1.676 m (5' 6\")   Wt 107 kg (236 lb)   BMI 38.09 kg/m²     Physical Exam  Vitals reviewed.   HENT:      Head:      Comments: Throat congested.     Nose: Congestion present.      Comments: Postnasal drip.  Cardiovascular:      Heart sounds: Normal heart sounds, S1 normal and S2 normal. No murmur heard.     No friction rub.   Pulmonary:      Effort: Pulmonary effort is normal.      Breath sounds: Wheezing present.   Abdominal:      Palpations: There is no hepatomegaly, splenomegaly or mass.   Musculoskeletal:      Right lower leg: Edema present.      Left lower leg: Edema present.   Lymphadenopathy:      Lower Body: No right inguinal adenopathy. No left inguinal adenopathy.   Neurological:      Cranial Nerves: Cranial nerves 2-12 are intact.      Sensory: No sensory deficit.      Motor: Motor function is intact.      Deep Tendon Reflexes: Reflexes are normal and symmetric.     LAB WORK:  Laboratory testing discussed.    Assessment/Plan   Problem List Items Addressed This Visit           ICD-10-CM    Edema R60.9    Cough - Primary R05.9    Relevant Medications    doxycycline (Vibramycin) 100 mg capsule    predniSONE (Deltasone) 5 mg tablet     Other Visit Diagnoses         Codes      Acute asthmatic bronchitis ([facility])     J45.909    Relevant Medications    doxycycline (Vibramycin) 100 mg capsule    predniSONE (Deltasone) 5 mg tablet      Overweight     E66.3        1. " Acute asthmatic bronchitis.  Doxycycline, prednisone ______.   2. Overweight.  Diet and exercise.  3. Lung function test reviewed.  She is going to see Dr. Duffy.  4. Edema, stable.    Scribe Attestation  By signing my name below, ILeidy Scribe attest that this documentation has been prepared under the direction and in the presence of Yarelis Blanco MD.     All medical record entries made by the scribe were personally dictated by me I have reviewed the chart and agree the record accurately reflects my personal performance of his history physical examination and management'

## 2025-05-04 PROBLEM — N18.30 STAGE 3 CHRONIC KIDNEY DISEASE, UNSPECIFIED WHETHER STAGE 3A OR 3B CKD (MULTI): Status: ACTIVE | Noted: 2025-05-04

## 2025-05-04 PROBLEM — L97.909 VENOUS STASIS ULCER, UNSPECIFIED SITE, UNSPECIFIED ULCER STAGE, UNSPECIFIED WHETHER VARICOSE VEINS PRESENT (MULTI): Status: ACTIVE | Noted: 2025-05-04

## 2025-05-04 PROBLEM — D32.9 BENIGN NEOPLASM OF MENINGES, UNSPECIFIED: Status: ACTIVE | Noted: 2025-05-04

## 2025-05-04 PROBLEM — L97.901: Status: ACTIVE | Noted: 2025-05-04

## 2025-05-04 PROBLEM — M45.0 ANKYLOSING SPONDYLITIS OF MULTIPLE SITES IN SPINE (MULTI): Status: ACTIVE | Noted: 2025-05-04

## 2025-05-04 PROBLEM — E66.01 OBESITY, MORBID (MULTI): Status: ACTIVE | Noted: 2023-09-01

## 2025-05-04 PROBLEM — N18.31 CHRONIC KIDNEY DISEASE, STAGE 3A (MULTI): Status: ACTIVE | Noted: 2025-05-04

## 2025-05-04 PROBLEM — I50.9 EDEMA DUE TO CONGESTIVE HEART FAILURE: Status: ACTIVE | Noted: 2023-05-25

## 2025-05-04 PROBLEM — M45.9 ANKYLOSING SPONDYLITIS, UNSPECIFIED SITE OF SPINE (MULTI): Status: ACTIVE | Noted: 2025-05-04

## 2025-05-04 PROBLEM — I83.009 VENOUS STASIS ULCER, UNSPECIFIED SITE, UNSPECIFIED ULCER STAGE, UNSPECIFIED WHETHER VARICOSE VEINS PRESENT (MULTI): Status: ACTIVE | Noted: 2025-05-04

## 2025-05-05 ENCOUNTER — HOSPITAL ENCOUNTER (OUTPATIENT)
Dept: NEUROLOGY | Facility: HOSPITAL | Age: 75
Discharge: HOME | End: 2025-05-05
Payer: MEDICARE

## 2025-05-05 DIAGNOSIS — R44.2 OLFACTORY HALLUCINATIONS: ICD-10-CM

## 2025-05-05 PROCEDURE — 95819 EEG AWAKE AND ASLEEP: CPT

## 2025-05-08 LAB
CREAT SERPL-MCNC: 1.17 MG/DL (ref 0.6–1)
EGFRCR SERPLBLD CKD-EPI 2021: 49 ML/MIN/1.73M2

## 2025-05-11 ENCOUNTER — HOSPITAL ENCOUNTER (OUTPATIENT)
Dept: RADIOLOGY | Facility: HOSPITAL | Age: 75
Discharge: HOME | End: 2025-05-11
Payer: MEDICARE

## 2025-05-11 DIAGNOSIS — G43.109 MIGRAINE WITH AURA AND WITHOUT STATUS MIGRAINOSUS, NOT INTRACTABLE: ICD-10-CM

## 2025-05-11 DIAGNOSIS — R44.2 OLFACTORY HALLUCINATIONS: ICD-10-CM

## 2025-05-11 PROCEDURE — 2550000001 HC RX 255 CONTRASTS: Mod: JZ | Performed by: PSYCHIATRY & NEUROLOGY

## 2025-05-11 PROCEDURE — 70553 MRI BRAIN STEM W/O & W/DYE: CPT

## 2025-05-11 PROCEDURE — A9575 INJ GADOTERATE MEGLUMI 0.1ML: HCPCS | Mod: JZ | Performed by: PSYCHIATRY & NEUROLOGY

## 2025-05-11 PROCEDURE — 70553 MRI BRAIN STEM W/O & W/DYE: CPT | Performed by: RADIOLOGY

## 2025-05-11 RX ORDER — GADOTERATE MEGLUMINE 376.9 MG/ML
20 INJECTION INTRAVENOUS
Status: COMPLETED | OUTPATIENT
Start: 2025-05-11 | End: 2025-05-11

## 2025-05-11 RX ADMIN — GADOTERATE MEGLUMINE 20 ML: 376.9 INJECTION INTRAVENOUS at 17:52

## 2025-05-17 ENCOUNTER — APPOINTMENT (OUTPATIENT)
Dept: RADIOLOGY | Facility: HOSPITAL | Age: 75
End: 2025-05-17
Payer: MEDICARE

## 2025-05-19 ENCOUNTER — APPOINTMENT (OUTPATIENT)
Dept: OTOLARYNGOLOGY | Facility: CLINIC | Age: 75
End: 2025-05-19
Payer: MEDICARE

## 2025-05-19 ENCOUNTER — TELEPHONE (OUTPATIENT)
Dept: NEUROLOGY | Facility: CLINIC | Age: 75
End: 2025-05-19

## 2025-05-19 VITALS — BODY MASS INDEX: 39.32 KG/M2 | HEIGHT: 65 IN | TEMPERATURE: 97.3 F | WEIGHT: 236 LBS

## 2025-05-19 DIAGNOSIS — H93.12 TINNITUS OF LEFT EAR: ICD-10-CM

## 2025-05-19 DIAGNOSIS — J30.9 ALLERGIC RHINITIS, UNSPECIFIED SEASONALITY, UNSPECIFIED TRIGGER: ICD-10-CM

## 2025-05-19 DIAGNOSIS — R42 DIZZINESS: ICD-10-CM

## 2025-05-19 DIAGNOSIS — T78.3XXA IDIOPATHIC ANGIOEDEMA, INITIAL ENCOUNTER: Primary | ICD-10-CM

## 2025-05-19 DIAGNOSIS — H90.3 BILATERAL SENSORINEURAL HEARING LOSS: ICD-10-CM

## 2025-05-19 PROCEDURE — 1159F MED LIST DOCD IN RCRD: CPT | Performed by: OTOLARYNGOLOGY

## 2025-05-19 PROCEDURE — 1036F TOBACCO NON-USER: CPT | Performed by: OTOLARYNGOLOGY

## 2025-05-19 PROCEDURE — 3008F BODY MASS INDEX DOCD: CPT | Performed by: OTOLARYNGOLOGY

## 2025-05-19 PROCEDURE — 99214 OFFICE O/P EST MOD 30 MIN: CPT | Performed by: OTOLARYNGOLOGY

## 2025-05-19 NOTE — PROGRESS NOTES
Chief Complaint   Patient presents with    Follow-up     LOV 4/25 F/U SINUS INF, DIZZINESS STARTED YESTERDAY, HAD MRI WHICH WAS NEGATIVE     HPI:  She has multiple issues going on now.  Her  had a heart attack and subsequent CABG.  She has some olfactory hallucinations.  She had a normal MRI of the head and normal EEG.  She does have migraine today and is following with Dr. Anderson.  She is holding her rheumatoid arthritis infusion while she her  is recovering from surgery.  She had an upper respiratory tract infection that is resolving.  She is complaining of left-sided tinnitus.  The Derm otic has been working well and her ears    Her angioedema is doing somewhat better on Xolair.  She has dizziness related to a head injury she suffered in 2019 with significant concussion.  The left ear has felt plugged.  She was found to have cerumen impaction and possible otitis externa and is referred for cleaning out the ear.  She is not hearing well from the left ear    Recall from October 2024: She is here for additional opinion on recurrent angioedema.  She is following with Dr. Mcnally allergy/immunology.  She is having recurrent angioedema involving the entire tongue.  She had 4 episodes last week.  She wanted to make sure there is no additional problem with the tongue.  She is having nasal congestion  Recall from Dr. Reyes March 2024:  Gretta Wilks is a 75 y.o. female who presents today for evaluation of her larynx.  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  She treats herself with Benadryl, steroids, and rarely an EpiPen.  She also does see an allergy/immunologist and has had extensive workup both here and away for this problem.  She was recommended to follow-up with ENT to get visualization of the larynx for some hoarseness which has been present since about August.  She reports having a chemical exposure then which did  trigger some recurrent angioedema.  No current shortness of breath or stridor.  No sore throat or dysphagia.  She continues to have some left-sided tinnitus after concussion last fall    PMH:  Past Medical History:   Diagnosis Date    Abnormal findings on diagnostic imaging of skull and head, not elsewhere classified 11/26/2019    Abnormal MRI of head    Angioedema     Arthritis     Atypical ductal hyperplasia, breast     Body mass index (BMI) 38.0-38.9, adult     BMI 38.0-38.9,adult    Headache, unspecified 09/18/2019    Headache    High cholesterol     History of angioedema     Hypertension     Hypothyroidism     Low back pain, unspecified 06/25/2015    Acute low back pain due to trauma    Migraine with aura, not intractable, without status migrainosus 12/12/2022    Migraine with aura and without status migrainosus, not intractable    Nausea 10/22/2015    Nausea    Neuropathic pain     Obesity     Pain in unspecified hip 08/22/2016    Hip pain    Personal history of other diseases of the digestive system 08/14/2013    History of gastroenteritis    Personal history of other diseases of the respiratory system 11/17/2014    History of acute sinusitis    Personal history of other diseases of the respiratory system 07/27/2015    History of acute bronchitis    Personal history of other diseases of urinary system 10/10/2013    History of hematuria    Personal history of other infectious and parasitic diseases 09/30/2015    History of athlete's foot    Personal history of other infectious and parasitic diseases 11/04/2015    History of candidiasis of mouth    Personal history of other specified conditions 11/30/2016    History of abdominal pain    Personal history of other specified conditions 02/06/2014    History of insomnia    Personal history of other specified conditions 10/07/2013    History of abdominal pain    Personal history of other specified conditions 10/10/2013    History of urinary frequency    Personal  history of other specified conditions 10/22/2013    History of dizziness    Personal history of other specified conditions 10/22/2013    History of syncope    Postmenopausal      Past Surgical History:   Procedure Laterality Date    BREAST LUMPECTOMY      KNEE SURGERY  09/23/2013    Knee Surgery    MR HEAD ANGIO WO IV CONTRAST  09/08/2021    MR HEAD ANGIO WO IV CONTRAST [facility]    OTHER SURGICAL HISTORY  09/23/2013    Biopsy Tongue    OTHER SURGICAL HISTORY  09/23/2013    Gynecologic Laparoscopy With Adhesiolysis    TONSILLECTOMY  02/09/2015    Tonsillectomy         Medications:     Current Outpatient Medications:     albuterol 90 mcg/actuation inhaler, Inhale 2 puffs 3 times a day., Disp: 18 g, Rfl: 3    Allergy Relief, fexofenadine, 180 mg tablet, Take 2 tablets (360 mg) by mouth every 12 hours., Disp: , Rfl:     amLODIPine (Norvasc) 5 mg tablet, Take 1 tablet (5 mg) by mouth once daily., Disp: 90 tablet, Rfl: 3    butalbital-acetaminophen-caff -40 mg tablet, Take 2 tablets by mouth once daily as needed for headaches or migraine., Disp: 20 tablet, Rfl: 0    diazePAM (Valium) 5 mg tablet, TAKE 1 TABLET BY MOUTH EVERY 12 HOURS FOR UP TO 7 DAYS AS NEEDED FOR VERTIGO, Disp: , Rfl:     econazole nitrate 1 % cream, Apply topically 2 times a day as needed for irritation or rash., Disp: 300 g, Rfl: 0    EPINEPHrine (Epipen) 0.3 mg/0.3 mL injection syringe, Inject 0.3 mL (0.3 mg) into the muscle 1 time if needed for anaphylaxis for up to 1 dose. Inject into upper leg. Call 911 after use., Disp: 1 each, Rfl: 0    EPINEPHrine 0.3 mg/0.3 mL injection syringe, Inject 0.3 mL (0.3 mg) into the muscle if needed for anaphylaxis. Inject into upper leg. Call 911 after use., Disp: 1 mL, Rfl: 0    famotidine (Pepcid) 40 mg tablet, Take 1 tablet (40 mg) by mouth every 12 hours., Disp: , Rfl:     fluocinolone (DermOtic) 0.01 % ear drops, 3 drops in the left ear every day for 2 weeks and then every other day  thereafter, Disp: 20 mL, Rfl: 3    fluticasone (Flonase Sensimist) 27.5 mcg/actuation nasal spray, Administer 1 spray into each nostril once daily., Disp: 10 g, Rfl: 5    fluticasone furoate-vilanteroL (Breo Ellipta) 200-25 mcg/dose inhaler, Inhale 1 puff once daily., Disp: 1 each, Rfl: 0    gabapentin (Neurontin) 300 mg capsule, Take 1 capsule (300 mg) by mouth 3 times a day., Disp: 90 capsule, Rfl: 2    ipratropium-albuteroL (Duo-Neb) 0.5-2.5 mg/3 mL nebulizer solution, Take 3 mL by nebulization 3 times a day as needed for wheezing., Disp: 180 mL, Rfl: 11    loratadine (Claritin) 10 mg tablet, Take 1 tablet (10 mg) by mouth once daily., Disp: 30 tablet, Rfl: 0    meclizine (Antivert) 25 mg tablet, Take 1 tablet (25 mg) by mouth 3 times a day., Disp: 30 tablet, Rfl: 3    metoprolol succinate XL (Toprol-XL) 25 mg 24 hr tablet, Take 1 tablet (25 mg) by mouth once daily. Do not crush or chew., Disp: 90 tablet, Rfl: 3    nebulizer accessories misc, 3 times daily, Disp: 100 each, Rfl: 5    omalizumab (Xolair) 150 mg injection, Inject under the skin every 28 (twenty-eight) days., Disp: , Rfl:     ondansetron (Zofran) 4 mg tablet, Take 1 tablet (4 mg) by mouth every 8 hours if needed for nausea or vomiting., Disp: 60 tablet, Rfl: 2    ondansetron ODT (Zofran-ODT) 4 mg disintegrating tablet, Dissolve 1 tablet (4 mg) in the mouth every 8 hours if needed for nausea or vomiting., Disp: 60 tablet, Rfl: 2    pantoprazole (ProtoNix) 40 mg EC tablet, Take 1 tablet (40 mg) by mouth once daily., Disp: 90 tablet, Rfl: 1    potassium chloride CR (Klor-Con M20) 20 mEq ER tablet, Take 1 tablet (20 mEq) by mouth 2 times a day. Do not crush or chew., Disp: 90 tablet, Rfl: 1    predniSONE (Deltasone) 20 mg tablet, Take 1 tablet (20 mg) by mouth early in the morning.., Disp: , Rfl:     secukinumab (Cosentyx Pen, 2 Pens,) 150 mg/mL self-injector pen, Inject 2 mL (300 mg) under the skin 1 (one) time per week., Disp: , Rfl:     Synthroid 125  "mcg tablet, Take 1 tablet (125 mcg) by mouth once daily., Disp: 90 tablet, Rfl: 3    montelukast (Singulair) 10 mg tablet, Take 1 tablet (10 mg) by mouth once daily at bedtime., Disp: 30 tablet, Rfl: 3     Allergies:  Allergies   Allergen Reactions    Formaldehyde Anaphylaxis    Cefepime Rash    Colchicine Swelling    Hydromorphone Nausea/vomiting    Indomethacin Swelling    Penicillins Hives    Sulfa (Sulfonamide Antibiotics) Hives and Other    Methotrexate Nausea Only    Other Unknown     Allergy: Cardiac Monitor Leads, Reaction: Unknown  Allergy: Fiberglass, Reaction: Moderate/Swelling  Allergy: Resins/Plastics, Reaction: Severe/Anaphylaxis    No Known Food Allergies    Ubrelvy [Ubrogepant] Unknown    Prochlorperazine Unknown        ROS:  Review of systems normal unless stated otherwise in the HPI and/or PMH.    Physical Exam:  Temperature 36.3 °C (97.3 °F), height 1.651 m (5' 5\"), weight 107 kg (236 lb), not currently breastfeeding. Body mass index is 39.27 kg/m².     GENERAL APPEARANCE: Well developed and well nourished.  Alert and oriented in no acute distress.  Normal vocal quality.      HEAD/FACE: No erythema or edema or facial tenderness.  Normal facial nerve function bilaterally.    EAR:       EXTERNAL: Normal pinnas and external auditory canals improved eczematous impaction and dried skin.         MIDDLE EAR: Tympanic membranes intact and mobile with normal landmarks.  Middle ear space appears well aerated.       TUBE STATUS: N/A       MASTOID CAVITY: N/A       HEARING: Gross hearing assessment is within normal limits.      NOSE:       VISUALIZED USING: Anterior rhinoscopy with headlight and nasal speculum.       DORSUM: Midline, nontraumatic appearance.       MUCOSA: Normal-appearing.       SECRETIONS: Normal.       SEPTUM: Midline and nonobstructing.       INFERIOR TURBINATES: Normal.       MIDDLE TURBINATES/MEATUS: N/A       BLEEDING: N/A         ORAL CAVITY/PHARYNX:       TEETH: Adequate dentition.    " "   TONGUE: No mass or lesion.  Normal mobility.       FLOOR OF MOUTH: No mass or lesion.       PALATE: Normal hard palate, soft palate, and uvula.       OROPHARYNX: Normal without mass or lesion.  Tonsils absent       BUCCAL MUCOSA/GBS: Normal without mass or lesion.       LIPS: Normal.        NECK: No palpable masses or abnormal adenopathy.  Trachea is midline.    THYROID: No thyromegaly or palpable nodule.    SALIVARY GLANDS: Normal bilateral parotid and submandibular glands by inspection and palpation.    TMJ's: Normal.    NEURO: Cranial nerve exam grossly normal bilaterally.       Assessment/Plan   Gretta \"Dixie\" was seen today for follow-up.  Diagnoses and all orders for this visit:  Idiopathic angioedema, initial encounter (Primary)  Allergic rhinitis, unspecified seasonality, unspecified trigger  Bilateral sensorineural hearing loss  Tinnitus of left ear  Dizziness               She has many issues going on right now and I have recommended not making any changes from an ENT standpoint.  Recheck in 3 months.  We discussed the possibility of hearing aids for her tinnitus.  Continue with Derm otic    Sim Pickett MD    "

## 2025-05-22 ENCOUNTER — OFFICE VISIT (OUTPATIENT)
Dept: PRIMARY CARE | Facility: CLINIC | Age: 75
End: 2025-05-22
Payer: MEDICARE

## 2025-05-22 VITALS
SYSTOLIC BLOOD PRESSURE: 142 MMHG | HEIGHT: 65 IN | DIASTOLIC BLOOD PRESSURE: 82 MMHG | BODY MASS INDEX: 39.69 KG/M2 | WEIGHT: 238.2 LBS

## 2025-05-22 DIAGNOSIS — R42 DIZZINESS: ICD-10-CM

## 2025-05-22 DIAGNOSIS — M45.0 ANKYLOSING SPONDYLITIS OF MULTIPLE SITES IN SPINE (MULTI): ICD-10-CM

## 2025-05-22 DIAGNOSIS — H66.90 OTITIS MEDIA, UNSPECIFIED LATERALITY, UNSPECIFIED OTITIS MEDIA TYPE: ICD-10-CM

## 2025-05-22 DIAGNOSIS — J84.10 PULMONARY FIBROSIS, UNSPECIFIED (MULTI): ICD-10-CM

## 2025-05-22 DIAGNOSIS — J45.50 SEVERE PERSISTENT EXTRINSIC ASTHMA WITHOUT COMPLICATION (MULTI): ICD-10-CM

## 2025-05-22 DIAGNOSIS — J44.1 CHRONIC OBSTRUCTIVE PULMONARY DISEASE WITH (ACUTE) EXACERBATION (MULTI): ICD-10-CM

## 2025-05-22 DIAGNOSIS — R09.82 POSTNASAL DRIP: ICD-10-CM

## 2025-05-22 DIAGNOSIS — I50.20 SYSTOLIC HEART FAILURE, UNSPECIFIED HF CHRONICITY: ICD-10-CM

## 2025-05-22 DIAGNOSIS — H66.90 EAR INFECTION: ICD-10-CM

## 2025-05-22 DIAGNOSIS — N18.30 STAGE 3 CHRONIC KIDNEY DISEASE, UNSPECIFIED WHETHER STAGE 3A OR 3B CKD (MULTI): ICD-10-CM

## 2025-05-22 DIAGNOSIS — D32.9 BENIGN NEOPLASM OF MENINGES, UNSPECIFIED: ICD-10-CM

## 2025-05-22 DIAGNOSIS — R11.0 DAILY NAUSEA: ICD-10-CM

## 2025-05-22 DIAGNOSIS — H91.93 HEARING PROBLEM OF BOTH EARS: ICD-10-CM

## 2025-05-22 DIAGNOSIS — L97.901: ICD-10-CM

## 2025-05-22 DIAGNOSIS — H69.90 DYSFUNCTION OF EUSTACHIAN TUBE, UNSPECIFIED LATERALITY: ICD-10-CM

## 2025-05-22 DIAGNOSIS — R09.81 SINUS CONGESTION: Primary | ICD-10-CM

## 2025-05-22 DIAGNOSIS — M06.4 INFLAMMATORY POLYARTHROPATHY (MULTI): ICD-10-CM

## 2025-05-22 PROCEDURE — 3008F BODY MASS INDEX DOCD: CPT | Performed by: INTERNAL MEDICINE

## 2025-05-22 PROCEDURE — 3077F SYST BP >= 140 MM HG: CPT | Performed by: INTERNAL MEDICINE

## 2025-05-22 PROCEDURE — 99214 OFFICE O/P EST MOD 30 MIN: CPT | Performed by: INTERNAL MEDICINE

## 2025-05-22 PROCEDURE — 1159F MED LIST DOCD IN RCRD: CPT | Performed by: INTERNAL MEDICINE

## 2025-05-22 PROCEDURE — 3079F DIAST BP 80-89 MM HG: CPT | Performed by: INTERNAL MEDICINE

## 2025-05-22 RX ORDER — DOXYCYCLINE 100 MG/1
100 CAPSULE ORAL 2 TIMES DAILY
Qty: 20 CAPSULE | Refills: 0 | Status: SHIPPED | OUTPATIENT
Start: 2025-05-22 | End: 2025-06-01

## 2025-05-22 RX ORDER — NEOMYCIN SULFATE, POLYMYXIN B SULFATE, HYDROCORTISONE 3.5; 10000; 1 MG/ML; [USP'U]/ML; MG/ML
2 SOLUTION/ DROPS AURICULAR (OTIC) 4 TIMES DAILY
Qty: 10 ML | Refills: 0 | Status: SHIPPED | OUTPATIENT
Start: 2025-05-22 | End: 2025-05-30

## 2025-05-22 RX ORDER — MECLIZINE HYDROCHLORIDE 25 MG/1
25 TABLET ORAL 3 TIMES DAILY
Qty: 30 TABLET | Refills: 3 | Status: SHIPPED | OUTPATIENT
Start: 2025-05-22

## 2025-05-22 NOTE — PROGRESS NOTES
"Subjective   Patient ID: Dixie Wilks is a 75 y.o. female who presents for sinus congestion, postnasal drip and ears congested.     Ms. Gretta Wilks today came here for sinus congestion, postnasal drip, both ear popping, congested.  She thinks it is a recurrent problem.  Dizzy, not feeling good.  She came for follow up on various conditions.  Appetite and weight are okay.  No problem.    I have personally reviewed the patient's Past Medical History, Medications, Allergies, Social History, and Family History in the EMR.    Review of Systems   All other systems reviewed and are negative.    Objective   /82   Ht 1.651 m (5' 5\")   Wt 108 kg (238 lb 3.2 oz)   BMI 39.64 kg/m²     Physical Exam  Vitals reviewed.   HENT:      Ears:      Comments: Both ears inflamed.  Otitis externa.  Wax present.     Nose: Congestion present.      Comments: Postnasal drip.     Mouth/Throat:      Comments: Throat congested.  Cardiovascular:      Heart sounds: Normal heart sounds, S1 normal and S2 normal. No murmur heard.     No friction rub.   Pulmonary:      Effort: Pulmonary effort is normal.      Breath sounds: Normal breath sounds and air entry.   Abdominal:      Palpations: There is no hepatomegaly, splenomegaly or mass.   Musculoskeletal:      Right lower leg: No edema.      Left lower leg: No edema.   Lymphadenopathy:      Lower Body: No right inguinal adenopathy. No left inguinal adenopathy.   Neurological:      Cranial Nerves: Cranial nerves 2-12 are intact.      Sensory: No sensory deficit.      Motor: Motor function is intact.      Deep Tendon Reflexes: Reflexes are normal and symmetric.     LAB WORK: Laboratory testing discussed.    Assessment/Plan   Problem List Items Addressed This Visit           ICD-10-CM    Dizziness R42    Daily nausea R11.0    Relevant Medications    meclizine (Antivert) 25 mg tablet     Other Visit Diagnoses         Codes      Sinus congestion    -  Primary R09.81      Ear infection     H66.90    " Relevant Medications    neomycin-polymyxin-HC (Cortisporin) otic solution    doxycycline (Vibramycin) 100 mg capsule    meclizine (Antivert) 25 mg tablet    Other Relevant Orders    Referral to ENT      Hearing problem of both ears     H91.93    Relevant Medications    neomycin-polymyxin-HC (Cortisporin) otic solution    doxycycline (Vibramycin) 100 mg capsule    meclizine (Antivert) 25 mg tablet    Other Relevant Orders    Referral to ENT      Postnasal drip     R09.82      Dysfunction of Eustachian tube, unspecified laterality     H69.90      Otitis media, unspecified laterality, unspecified otitis media type     H66.90        1. Bilateral otitis externa, infection.  Cortisporin drops.  2. Sinus congestion, postnasal drip, eustachian tube dysfunction, otitis media.  Doxycycline, Claritin.  3. Dizziness.  Meclizine given.  4. The patient will benefit by seeing otologist, ear specialist, for further vestibular testing and treatment.  5. I will refer her to the [facility] tertiary level care.    Scribe Attestation  By signing my name below, I, Agusto Koo attest that this documentation has been prepared under the direction and in the presence of Yarelis Blanco MD.

## 2025-05-26 PROBLEM — J45.50: Status: ACTIVE | Noted: 2025-05-26

## 2025-05-26 PROBLEM — J84.10 PULMONARY FIBROSIS, UNSPECIFIED (MULTI): Status: ACTIVE | Noted: 2025-05-26

## 2025-05-26 PROBLEM — J44.1 CHRONIC OBSTRUCTIVE PULMONARY DISEASE WITH (ACUTE) EXACERBATION (MULTI): Status: ACTIVE | Noted: 2025-05-26

## 2025-05-28 ENCOUNTER — TELEPHONE (OUTPATIENT)
Dept: PRIMARY CARE | Facility: CLINIC | Age: 75
End: 2025-05-28
Payer: MEDICARE

## 2025-05-30 ENCOUNTER — APPOINTMENT (OUTPATIENT)
Dept: NEUROLOGY | Facility: CLINIC | Age: 75
End: 2025-05-30
Payer: MEDICARE

## 2025-05-30 VITALS — WEIGHT: 237 LBS | BODY MASS INDEX: 38.09 KG/M2 | HEIGHT: 66 IN | TEMPERATURE: 97.8 F

## 2025-05-30 DIAGNOSIS — R44.2 OLFACTORY HALLUCINATIONS: Primary | ICD-10-CM

## 2025-05-30 DIAGNOSIS — M79.2 NEUROPATHIC PAIN: ICD-10-CM

## 2025-05-30 DIAGNOSIS — G43.019 COMMON MIGRAINE WITH INTRACTABLE MIGRAINE: ICD-10-CM

## 2025-05-30 PROCEDURE — 1036F TOBACCO NON-USER: CPT | Performed by: PSYCHIATRY & NEUROLOGY

## 2025-05-30 PROCEDURE — 1159F MED LIST DOCD IN RCRD: CPT | Performed by: PSYCHIATRY & NEUROLOGY

## 2025-05-30 PROCEDURE — 99215 OFFICE O/P EST HI 40 MIN: CPT | Performed by: PSYCHIATRY & NEUROLOGY

## 2025-05-30 PROCEDURE — 3008F BODY MASS INDEX DOCD: CPT | Performed by: PSYCHIATRY & NEUROLOGY

## 2025-05-30 PROCEDURE — 1125F AMNT PAIN NOTED PAIN PRSNT: CPT | Performed by: PSYCHIATRY & NEUROLOGY

## 2025-05-30 ASSESSMENT — PAIN SCALES - GENERAL: PAINLEVEL_OUTOF10: 5

## 2025-05-30 NOTE — PATIENT INSTRUCTIONS
You have had recent problems with ear infections.  Continue taking the medications prescribed, follow  up with ENT on treatment.  Your MRI of the head and EEG are reassuring, so continue taking the butalbital compound and generic Zofran for the migraines.  Follow up in 6 months.

## 2025-05-30 NOTE — PROGRESS NOTES
Subjective   Dixie Wilks is a 75 y.o. right-handed female.  HPI  This is a 75 year old woman with a history of migraines, left ulnar nerve injury, last seen one month ago.  She has a history of hypersensitivity to odors.  She has had dizziness, nausea, and headache this morning.  She had acute congestion, URI symptoms, onset around 5/22/25, seen by Dr. KAYLENE Blanco- treated for ear infections, and referred to ENT, Dr. Mikael Pickett- cleared her.  However, she is going to see Dr. Chun, ENT.  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  EEG was normal.  Objective   Neurological Exam  Physical Exam  I personally reviewed laboratory, radiographic, and medical studies which were pertinent for nothing.    Assessment/Plan

## 2025-06-05 ENCOUNTER — APPOINTMENT (OUTPATIENT)
Dept: AUDIOLOGY | Facility: CLINIC | Age: 75
End: 2025-06-05
Payer: MEDICARE

## 2025-06-05 ENCOUNTER — OFFICE VISIT (OUTPATIENT)
Dept: OTOLARYNGOLOGY | Facility: CLINIC | Age: 75
End: 2025-06-05
Payer: MEDICARE

## 2025-06-05 VITALS — WEIGHT: 237 LBS | BODY MASS INDEX: 38.09 KG/M2 | HEIGHT: 66 IN

## 2025-06-05 DIAGNOSIS — B36.9 ACUTE MYCOTIC OTITIS EXTERNA: ICD-10-CM

## 2025-06-05 DIAGNOSIS — H62.40 ACUTE MYCOTIC OTITIS EXTERNA: ICD-10-CM

## 2025-06-05 DIAGNOSIS — H60.8X3 CHRONIC ECZEMATOUS OTITIS EXTERNA OF BOTH EARS: ICD-10-CM

## 2025-06-05 DIAGNOSIS — H61.23 BILATERAL IMPACTED CERUMEN: Primary | ICD-10-CM

## 2025-06-05 DIAGNOSIS — H90.3 BILATERAL SENSORINEURAL HEARING LOSS: ICD-10-CM

## 2025-06-05 ASSESSMENT — ENCOUNTER SYMPTOMS
DIZZINESS: 1
HEADACHES: 1
WHEEZING: 1
NAUSEA: 1

## 2025-06-05 NOTE — PROGRESS NOTES
"AUDIOLOGIC EVALUATION    Name:  Gretta Wilks \"Dixie\"  :    1950  Age:     75 y.o.    HISTORY:     Patient was seen for initial hearing test in conjunction with medical follow-up for []. Patient was seen for repeat hearing test in conjunction with medical follow-up. Recall, most recent hearing test on file on [] revealed []. Today, she [] he [] reported [].    Denied hearing loss, tinnitus, otalgia, aural pressure/fullness, recent ear infections, otorrhea, dizziness, noise exposure, and [] chemotherapy/radiation, [] prior ear surgeries, head trauma, and family history of hearing loss.     EVALUATION:    Please see Audiogram.    RESULTS:    Otoscopy:  Right: Clear canal, normal color and appearance of tympanic membrane. []   Left: Clear canal, normal color and appearance of tympanic membrane. []    Tympanometry:  Right: Normal tympanic membrane mobility and middle ear pressure. [] Did not test. []  Left: Normal tympanic membrane mobility and middle ear pressure. [] Did not test. []    Hearing Sensitivity:  Right: []  Left: []      Word Recognition Score (NU-6 [] ):  Right: [] ( [] %) at [] dBHL which is equal to / [] above [] average speech level (55-60 dBHL).  Left: [] ( [] %) at [] dBHL which is equal to / [] above [] average speech level (55-60 dBHL).    IMPRESSIONS:    Compared to previous hearing evaluation on [], []  No previous hearing evaluation on file. []    ASSESSMENT AND PLAN:    - Continue medical follow-up with Dr. Blackwell.  - Consider further evaluation/ [] monitoring of asymmetric hearing loss, unilateral tinnitus, and dizziness [] .  - If patient perceives an increased difficulty hearing, [] Pending medical clearance, [] consider hearing needs assessment to discuss management of hearing loss [] and tinnitus.  - Monitor and recheck hearing as warranted.  - Counseled regarding results and recommendations.    - Counseled regarding tinnitus and tinnitus management strategies (Provide soft " background noise to help mask tinnitus. Reduce stress factors and consider lifestyle changes, including limiting caffeine, alcohol, and nicotine intake. Manage stress and anxiety, ensure adequate sleep, engage in regular exercise, and maintain a healthy diet).    - Counseled regarding noise exposure and use of hearing protection.    - Counseled regarding auditory processing, communication strategies, and considerations for further evaluation or management of these concerns.  Reducing background noise when possible.  Having others gain patient's attention prior to beginning a conversation.  Maintaining appropriate conversational distance (no more than 6 feet away) with minimal barriers.  Having conversational partners face the patient when they are talking to them.  Having conversational partners speak at an audible volume, clear dictation, and not too quickly.  Using facial cues (i.e. reading lips).    - [facility].     - Consider referral to [facility] hearing implant team for further discussion regarding implantable technologies and candidacy consideration.    - Continue use of amplification and follow-up as recommended for hearing aid maintenance and adjustments.      Blake Doss.  Clinical Audiologist.

## 2025-06-05 NOTE — PROGRESS NOTES
"Subjective   Patient ID: Gretta Wilks \"Juvenal" is a 75 y.o. female  HPI  Patient complains of a chronic history of hearing loss and intermittent bilateral nonpulsatile tinnitus.  She is complaining of a 2-week history of deterioration of the hearing.  She has no otalgia and no otorrhea.  She has a chronic history of eczema and has been using DermOtic drops.  Review of Systems   HENT:  Positive for hearing loss and tinnitus.    Respiratory:  Positive for wheezing.    Gastrointestinal:  Positive for nausea.   Neurological:  Positive for dizziness and headaches.       Objective   Physical Exam  The following elements of a brief ear nose and throat exam were performed: External ear canals and tympanic membranes, external nose and nasal passages, oral cavity, palpation of the neck, percussion of the face, palpation of the thyroid.    There is cerumen impaction bilaterally and this was cleared using speculum and curettes.  There is also extensive mycotic debris in the ear canals with flaking of the skin and this was suctioned under binocular magnification and debrided.  The tympanic membranes are clear and mobile and her hearing improved immediately.  There is clinical evidence of hearing loss noted during conversation.  The remainder of her exam was within normal limits.  Her last hearing test dated October 9, 2023 was reviewed and she is noted to have bilateral mild to severe downsloping sensorineural hearing loss.  The tympanograms were normal.    Ear cerumen removal    Date/Time: 6/5/2025 10:41 AM    Performed by: Donny Blackwell MD  Authorized by: Donny Blackwell MD    Consent:     Consent obtained:  Verbal    Risks discussed:  Pain  Procedure details:     Location:  L ear and R ear    Procedure type: curette        Assessment/Plan   Diagnoses and all orders for this visit:  Bilateral impacted cerumen (Primary)  -     Ear cerumen removal  Acute mycotic otitis externa  Chronic eczematous otitis externa of both " ears  Bilateral sensorineural hearing loss     1.  Bilateral mycotic otitis externa.  The patient's ear canals were treated with topical boric acid with the patient's permission.  2.  Chronic eczema of external ear canals and bilateral cerumen impaction cleared today.  3.  Chronic bilateral mild to severe downsloping sensorineural hearing loss.  She will follow-up in 1 week.

## 2025-06-05 NOTE — PROGRESS NOTES
"Subjective   Patient ID: Dixie Wilks is a 75 y.o. female who presents for Follow-up (various conditions).    Ms. Glynn today came here for multiple issues.  1. Both ears itchy, scratchy rash, not feeling good.  2. She has rashes in the armpits since she ______ biological immune system ______.  She saw ENT surgeon, who are giving drops in the ear for dermatitis that is causing her more itching.  She came here for follow-up on various conditions.    I have personally reviewed the patient's Past Medical History, Medications, Allergies, Social History, and Family History in the EMR.    Review of Systems   All other systems reviewed and are negative.    Objective   /70   Ht 1.676 m (5' 6\")   Wt 110 kg (243 lb)   BMI 39.22 kg/m²     Physical Exam  Vitals reviewed.   HENT:      Ears:      Comments: Both ear canals are inflamed.  It could be a mixed infection.  Cardiovascular:      Heart sounds: Normal heart sounds, S1 normal and S2 normal. No murmur heard.     No friction rub.   Pulmonary:      Effort: Pulmonary effort is normal.      Breath sounds: Normal breath sounds and air entry.   Abdominal:      Palpations: There is no hepatomegaly, splenomegaly or mass.   Musculoskeletal:      Right lower leg: No edema.      Left lower leg: No edema.   Lymphadenopathy:      Lower Body: No right inguinal adenopathy. No left inguinal adenopathy.   Skin:     Comments: Looks like dermatitis.   Neurological:      Cranial Nerves: Cranial nerves 2-12 are intact.      Sensory: No sensory deficit.      Motor: Motor function is intact.      Deep Tendon Reflexes: Reflexes are normal and symmetric.     LAB WORK:  Laboratory testing discussed.    Assessment/Plan   Problem List Items Addressed This Visit    None  Visit Diagnoses         Codes    Dermatitis    -  Primary L30.9    Relevant Medications    fluconazole (Diflucan) 100 mg tablet    econazole nitrate 1 % cream    Otitis externa, unspecified chronicity, unspecified laterality, " unspecified type     H60.90        1. Dermatitis, Candida.  Oral Diflucan, local Nystatin.  2. Ear otitis externa, eczema versus infection.  It could be a fungal.  I educated her on how to clean the ear canal with warm water and probably painted with a weak solution of Betadine and with the drops.  Diflucan should help.  I will review the situation in a couple of weeks.  3. Immune modulator.  I will leave between her and her specialist to decide those drugs, but definitely it is adversely affecting her immune system and getting all kind of infection.  I will keep an eye.    Scribe Attestation  By signing my name below, ILeidy Scribe attest that this documentation has been prepared under the direction and in the presence of Yarelis Blanco MD.     All medical record entries made by the aliyahibe were personally dictated by me I have reviewed the chart and agree the record accurately reflects my personal performance of his history physical examination and management     [employer]

## 2025-06-14 ENCOUNTER — APPOINTMENT (OUTPATIENT)
Dept: OTOLARYNGOLOGY | Facility: CLINIC | Age: 75
End: 2025-06-14
Payer: MEDICARE

## 2025-06-14 VITALS — WEIGHT: 237 LBS | HEIGHT: 66 IN | BODY MASS INDEX: 38.09 KG/M2

## 2025-06-14 DIAGNOSIS — H62.40 ACUTE MYCOTIC OTITIS EXTERNA: Primary | ICD-10-CM

## 2025-06-14 DIAGNOSIS — H61.23 BILATERAL IMPACTED CERUMEN: ICD-10-CM

## 2025-06-14 DIAGNOSIS — B36.9 ACUTE MYCOTIC OTITIS EXTERNA: Primary | ICD-10-CM

## 2025-06-14 DIAGNOSIS — H60.8X3 CHRONIC ECZEMATOUS OTITIS EXTERNA OF BOTH EARS: ICD-10-CM

## 2025-06-14 DIAGNOSIS — H90.3 BILATERAL SENSORINEURAL HEARING LOSS: ICD-10-CM

## 2025-06-14 PROCEDURE — 1159F MED LIST DOCD IN RCRD: CPT | Performed by: OTOLARYNGOLOGY

## 2025-06-14 PROCEDURE — 69210 REMOVE IMPACTED EAR WAX UNI: CPT | Performed by: OTOLARYNGOLOGY

## 2025-06-14 PROCEDURE — 3008F BODY MASS INDEX DOCD: CPT | Performed by: OTOLARYNGOLOGY

## 2025-06-14 PROCEDURE — 1036F TOBACCO NON-USER: CPT | Performed by: OTOLARYNGOLOGY

## 2025-06-14 PROCEDURE — 99213 OFFICE O/P EST LOW 20 MIN: CPT | Performed by: OTOLARYNGOLOGY

## 2025-06-14 PROCEDURE — 1160F RVW MEDS BY RX/DR IN RCRD: CPT | Performed by: OTOLARYNGOLOGY

## 2025-06-14 NOTE — PROGRESS NOTES
"Subjective   Patient ID: Gretta Wilks \"Juvenal" is a 75 y.o. female  HPI  Patient presents for follow-up for bilateral chronic eczema of the ear canals with mycotic otitis externa.  She is complaining of congestion in the ears again.  Review of Systems    Objective   Physical Exam  There is ceruminous and mycotic and squamous debris in the ear canals obstructing the ear canals and this was cleared using speculum and curettes and suction under binocular magnification.  Her ear congestion resolved immediately.  With the patient's permission the ear canals were treated with topical boric acid again.    Ear cerumen removal    Date/Time: 6/14/2025 10:35 AM    Performed by: Donny Blackwell MD  Authorized by: Donny Blackwell MD    Consent:     Consent obtained:  Verbal    Risks discussed:  Pain  Procedure details:     Location:  L ear and R ear    Procedure type: curette        Assessment/Plan   Diagnoses and all orders for this visit:  Acute mycotic otitis externa (Primary)  Chronic eczematous otitis externa of both ears  Bilateral sensorineural hearing loss  Bilateral impacted cerumen  -     Ear cerumen removal     1.  Severe bilateral otomycosis with cerumen and squamous debris and mycotic debris filling the ear canals again and this was cleaned and debrided today and the patient was again treated with topical boric acid.  2.  Chronic eczema of the external ear canals.  This will be treated once the otomycosis has resolved.  3.  Chronic bilateral sensorineural hearing loss.  "

## 2025-06-21 ENCOUNTER — APPOINTMENT (OUTPATIENT)
Dept: OTOLARYNGOLOGY | Facility: CLINIC | Age: 75
End: 2025-06-21
Payer: MEDICARE

## 2025-06-21 VITALS — HEIGHT: 66 IN | WEIGHT: 237 LBS | BODY MASS INDEX: 38.09 KG/M2

## 2025-06-21 DIAGNOSIS — H60.8X3 CHRONIC ECZEMATOUS OTITIS EXTERNA OF BOTH EARS: Primary | ICD-10-CM

## 2025-06-21 DIAGNOSIS — B36.9 ACUTE MYCOTIC OTITIS EXTERNA: ICD-10-CM

## 2025-06-21 DIAGNOSIS — H90.3 BILATERAL SENSORINEURAL HEARING LOSS: ICD-10-CM

## 2025-06-21 DIAGNOSIS — H62.40 ACUTE MYCOTIC OTITIS EXTERNA: ICD-10-CM

## 2025-06-21 RX ORDER — MOMETASONE FUROATE 1 MG/G
CREAM TOPICAL DAILY PRN
Qty: 15 G | Refills: 3 | Status: SHIPPED | OUTPATIENT
Start: 2025-06-21

## 2025-06-21 RX ORDER — FLUOCINONIDE 0.5 MG/G
CREAM TOPICAL DAILY PRN
Qty: 30 G | Refills: 3 | Status: SHIPPED | OUTPATIENT
Start: 2025-06-21

## 2025-06-21 NOTE — PROGRESS NOTES
"Subjective   Patient ID: Gretta Mejia"Juvenal" is a 75 y.o. female  HPI  Patient presents for follow-up for bilateral chronic eczema of the ear canals with mycotic otitis externa.  She is feeling much better.  Review of Systems    Objective   Physical Exam  There is residual boric acid and ceruminous and squamous debris in the ear canals bilaterally obstructing the ear canals and this was cleared using speculum and curettes and suction under binocular magnification.  The mycosis in the ear canals has resolved.  The tympanic membranes are clear and mobile.    Binocular microscopy    Date/Time: 6/21/2025 11:20 AM    Performed by: Donny Blackwell MD  Authorized by: Donny Blackwell MD    Consent:     Consent obtained:  Verbal    Risks discussed:  Pain  Procedure details:     Scope location: bilateral    Comments:      Right or left or bilateral ear(s) was/were examined under binocular magnification using either low or high power      Assessment/Plan   Diagnoses and all orders for this visit:  Chronic eczematous otitis externa of both ears (Primary)  -     mometasone (Elocon) 0.1 % cream; Apply topically once daily as needed (For itching).  -     fluocinonide 0.05 % cream; Apply topically once daily as needed (Apply in the ears once a day as needed for itching.).  Acute mycotic otitis externa  Bilateral sensorineural hearing loss  Other orders  -     Binocular microscopy     1.  Severe bilateral otomycosis with cerumen and squamous debris cleared today following multiple debridements and treatments with boric acid.  2.  Chronic eczema of the external ear canals.  The patient was started on mometasone cream and DermOtic drops.  3.  Chronic bilateral sensorineural hearing loss.  She will follow-up in 3 months.  "

## 2025-06-23 ENCOUNTER — TELEPHONE (OUTPATIENT)
Dept: OTOLARYNGOLOGY | Facility: CLINIC | Age: 75
End: 2025-06-23
Payer: MEDICARE

## 2025-06-23 DIAGNOSIS — H60.543 ECZEMA OF BOTH EXTERNAL EARS: ICD-10-CM

## 2025-06-23 RX ORDER — FLUOCINOLONE ACETONIDE 0.11 MG/ML
OIL AURICULAR (OTIC)
Qty: 20 ML | Refills: 3 | Status: SHIPPED | OUTPATIENT
Start: 2025-06-23

## 2025-06-30 ENCOUNTER — APPOINTMENT (OUTPATIENT)
Dept: NEUROLOGY | Facility: CLINIC | Age: 75
End: 2025-06-30
Payer: MEDICARE

## 2025-07-08 DIAGNOSIS — G43.009 MIGRAINE WITHOUT AURA AND WITHOUT STATUS MIGRAINOSUS, NOT INTRACTABLE: ICD-10-CM

## 2025-07-08 DIAGNOSIS — R11.0 DAILY NAUSEA: ICD-10-CM

## 2025-07-08 DIAGNOSIS — G43.109 MIGRAINE WITH AURA AND WITHOUT STATUS MIGRAINOSUS, NOT INTRACTABLE: ICD-10-CM

## 2025-07-08 RX ORDER — BUTALBITAL, ACETAMINOPHEN AND CAFFEINE 50; 325; 40 MG/1; MG/1; MG/1
2 TABLET ORAL DAILY PRN
Qty: 20 TABLET | Refills: 0 | Status: SHIPPED | OUTPATIENT
Start: 2025-07-08

## 2025-07-08 RX ORDER — ONDANSETRON 4 MG/1
4 TABLET, ORALLY DISINTEGRATING ORAL EVERY 8 HOURS PRN
Qty: 60 TABLET | Refills: 2 | Status: SHIPPED | OUTPATIENT
Start: 2025-07-08

## 2025-07-08 NOTE — TELEPHONE ENCOUNTER
Pt needs refills (HOT) of butalbital-acetaminophen-caff -40 mg tablet, Take 2 tablets po every day as needed for headaches or migraine and ondansetron ODT 4 mg disintegrating tablet, Dissolve 1 tablet in the mouth every 8 hours if needed for nausea or vomiting sent to [facility] [place]    Last seen:  5/302025  Next appt:  7/30/2025

## 2025-07-17 ENCOUNTER — OFFICE VISIT (OUTPATIENT)
Dept: OTOLARYNGOLOGY | Facility: CLINIC | Age: 75
End: 2025-07-17
Payer: MEDICARE

## 2025-07-17 ENCOUNTER — CLINICAL SUPPORT (OUTPATIENT)
Dept: AUDIOLOGY | Facility: CLINIC | Age: 75
End: 2025-07-17
Payer: MEDICARE

## 2025-07-17 VITALS
TEMPERATURE: 97.8 F | HEIGHT: 65 IN | SYSTOLIC BLOOD PRESSURE: 155 MMHG | HEART RATE: 55 BPM | BODY MASS INDEX: 40.32 KG/M2 | DIASTOLIC BLOOD PRESSURE: 80 MMHG | WEIGHT: 242 LBS

## 2025-07-17 DIAGNOSIS — H93.12 TINNITUS OF LEFT EAR: ICD-10-CM

## 2025-07-17 DIAGNOSIS — H90.3 ASYMMETRIC SNHL (SENSORINEURAL HEARING LOSS): ICD-10-CM

## 2025-07-17 DIAGNOSIS — H93.8X3 EAR FULLNESS, BILATERAL: ICD-10-CM

## 2025-07-17 DIAGNOSIS — H90.3 SNHL (SENSORY-NEURAL HEARING LOSS), ASYMMETRICAL: Primary | ICD-10-CM

## 2025-07-17 DIAGNOSIS — R42 VERTIGO: Primary | ICD-10-CM

## 2025-07-17 PROCEDURE — 1125F AMNT PAIN NOTED PAIN PRSNT: CPT | Performed by: NURSE PRACTITIONER

## 2025-07-17 PROCEDURE — 99204 OFFICE O/P NEW MOD 45 MIN: CPT | Performed by: NURSE PRACTITIONER

## 2025-07-17 PROCEDURE — 3079F DIAST BP 80-89 MM HG: CPT | Performed by: NURSE PRACTITIONER

## 2025-07-17 PROCEDURE — 92550 TYMPANOMETRY & REFLEX THRESH: CPT | Mod: 52 | Performed by: AUDIOLOGIST

## 2025-07-17 PROCEDURE — 99214 OFFICE O/P EST MOD 30 MIN: CPT | Performed by: NURSE PRACTITIONER

## 2025-07-17 PROCEDURE — 3077F SYST BP >= 140 MM HG: CPT | Performed by: NURSE PRACTITIONER

## 2025-07-17 PROCEDURE — 3008F BODY MASS INDEX DOCD: CPT | Performed by: NURSE PRACTITIONER

## 2025-07-17 PROCEDURE — 92557 COMPREHENSIVE HEARING TEST: CPT | Performed by: AUDIOLOGIST

## 2025-07-17 PROCEDURE — 1036F TOBACCO NON-USER: CPT | Performed by: NURSE PRACTITIONER

## 2025-07-17 PROCEDURE — 1159F MED LIST DOCD IN RCRD: CPT | Performed by: NURSE PRACTITIONER

## 2025-07-17 RX ORDER — CLOTRIMAZOLE AND BETAMETHASONE DIPROPIONATE 10; .64 MG/G; MG/G
CREAM TOPICAL
COMMUNITY
Start: 2025-05-05

## 2025-07-17 SDOH — ECONOMIC STABILITY: FOOD INSECURITY: WITHIN THE PAST 12 MONTHS, YOU WORRIED THAT YOUR FOOD WOULD RUN OUT BEFORE YOU GOT MONEY TO BUY MORE.: NEVER TRUE

## 2025-07-17 SDOH — ECONOMIC STABILITY: FOOD INSECURITY: WITHIN THE PAST 12 MONTHS, THE FOOD YOU BOUGHT JUST DIDN'T LAST AND YOU DIDN'T HAVE MONEY TO GET MORE.: NEVER TRUE

## 2025-07-17 ASSESSMENT — LIFESTYLE VARIABLES
AUDIT-C TOTAL SCORE: 0
SKIP TO QUESTIONS 9-10: 1
HOW OFTEN DO YOU HAVE SIX OR MORE DRINKS ON ONE OCCASION: NEVER
HOW MANY STANDARD DRINKS CONTAINING ALCOHOL DO YOU HAVE ON A TYPICAL DAY: PATIENT DOES NOT DRINK
HOW OFTEN DO YOU HAVE A DRINK CONTAINING ALCOHOL: NEVER

## 2025-07-17 ASSESSMENT — COLUMBIA-SUICIDE SEVERITY RATING SCALE - C-SSRS
1. IN THE PAST MONTH, HAVE YOU WISHED YOU WERE DEAD OR WISHED YOU COULD GO TO SLEEP AND NOT WAKE UP?: NO
2. HAVE YOU ACTUALLY HAD ANY THOUGHTS OF KILLING YOURSELF?: NO
6. HAVE YOU EVER DONE ANYTHING, STARTED TO DO ANYTHING, OR PREPARED TO DO ANYTHING TO END YOUR LIFE?: NO

## 2025-07-17 ASSESSMENT — ENCOUNTER SYMPTOMS
OCCASIONAL FEELINGS OF UNSTEADINESS: 0
LOSS OF SENSATION IN FEET: 0
DEPRESSION: 0

## 2025-07-17 ASSESSMENT — PAIN SCALES - GENERAL: PAINLEVEL_OUTOF10: 9

## 2025-07-17 NOTE — PROGRESS NOTES
"Subjective   Patient ID: Gretta Wilks \"Juvenal" is a 75 y.o. female who presents for Hearing Loss (Chronic ear infection).    HPI  Patient here for her ears since December. She has had blocked ears. She saw Dr. Blackwell in June who cleaned her ears cleaned.     She is having dizziness. Went to the ED on 7/8, this turned into migraine. It can be room spinning, if she  lays down it gets worse. She rolls over and it cane wake her up.  When she went to the ED it lasted 15 minutes. She felt her eyes were moving and she felt like she was falling. Bright lights and loud sounds do not make the patient dizzy. Denies pressure induced dizziness. She gets some echoing in her head. Denies positional vertigo.     She does have hearing loss. She gets ringing in the left ear since an accident where she fell in 2019. She feels she is falling to the left side since then. No ear pain.  Denies previous ear surgery.    Constitutional: No fever, chills, weight loss or weight gain  General appearance: Appears well, well-nourished, well groomed. No acute distress.    Communication: Normal communication    Psychiatric: Oriented to person, place and time. Normal mood and affect.    Neurologic: Cranial nerves II-XII grossly intact and symmetric bilaterally.    Head and Face:  Head: Atraumatic with no masses, lesions or scarring.  Face: Normal symmetry. No scars or deformities.  TMJ: Normal, no trismus.    Eyes: Conjunctiva not edematous or erythematous. PERRLA    Right Ear: External inspection of ear with no deformity, scars, or masses. EAC is clear.  TM is intact with no sign of infection, effusion, or retraction.  No perforation seen.     Left Ear: External inspection of ear with no deformity, scars, or masses. EAC is clear.  TM is intact with no sign of infection, effusion, or retraction.  No perforation seen.     Nichols midline.  AC > BC bilaterally.    Nose: External inspection of nose: No nasal lesions, lacerations or scars. Anterior " rhinoscopy with limited visualization past the inferior turbinates. No tenderness on frontal or maxillary sinus palpation.    Oral Cavity/Mouth: Oral cavity and oropharynx mucosa moist and pink. No lesions or masses. Tonsils appear normal. Uvula is midline. Tongue with no masses or lesions. Tongue with good mobility. The oropharynx is clear.    Neck: Normal appearing, symmetric, trachea midline.     Cardiovascular: Examination of peripheral vascular system shows no clubbing or cyanosis.    Respiratory: No respiratory distress increased work of breathing. Inspection of the chest with symmetric chest expansion and normal respiratory effort.    Skin: No head and neck rashes.    Lymph nodes: No adenopathy.  Problem List[1]  Surgical History[2]    Review of Systems    All other systems have been reviewed and are negative for complaints except for those mentioned in history of present illness, past medical history and problem list.    Objective   Physical Exam    Constitutional: No fever, chills, weight loss or weight gain  General appearance: Appears well, well-nourished, well groomed. No acute distress.    Communication: Normal communication    Psychiatric: Oriented to person, place and time. Normal mood and affect.    Neurologic: Cranial nerves II-XII grossly intact and symmetric bilaterally.    Head and Face:  Head: Atraumatic with no masses, lesions or scarring.  Face: Normal symmetry. No scars or deformities.  TMJ: Normal, no trismus.    Eyes: Conjunctiva not edematous or erythematous. PERRLA    Right Ear: External inspection of ear with no deformity, scars, or masses. EAC is clear.  TM is intact with no sign of infection, effusion, or retraction.  No perforation seen.     Left Ear: External inspection of ear with no deformity, scars, or masses. EAC is clear.  TM is intact with no sign of infection, effusion, or retraction.  No perforation seen.     Nose: External inspection of nose: No nasal lesions, lacerations or  scars. Anterior rhinoscopy with limited visualization past the inferior turbinates. No tenderness on frontal or maxillary sinus palpation.    Oral Cavity/Mouth: Oral cavity and oropharynx mucosa moist and pink. No lesions or masses. Tonsils appear normal. Uvula is midline. Tongue with no masses or lesions. Tongue with good mobility. The oropharynx is clear.    Neck: Normal appearing, symmetric, trachea midline.     Cardiovascular: Examination of peripheral vascular system shows no clubbing or cyanosis.    Respiratory: No respiratory distress increased work of breathing. Inspection of the chest with symmetric chest expansion and normal respiratory effort.    Skin: No head and neck rashes.    Lymph nodes: No adenopathy.    On vestibular exam, oculomotor function assessment shows normal ocular pursuits and saccades. There is no spontaneous nystagmus. Head Thrust test is negative bilaterally.  Positional testing including Vinny Hallpike is negative bilaterally.  Postural testing performed. Romberg is negative for any obvious weakness/sway. Tandem Gait is deferred due to patient's knee issues preventing her from doing this. Gait appears steady.     Diagnostic Results   I personally interpreted audiogram from today:      MRI brain w and wo IV contrast obtained 5/11/2025. I personally reviewed this.   IMPRESSION:  No striking change in appearance of the intracranial compartment  compared to the prior MRI brain. No abnormal intracranial enhancement  or mass. Stable venous Lake or hemangioma within the left parietal  temporal bones, likely an incidental finding.    I don't see evidence of acoustic neuroma.     Reviewed previous ENT notes.     Assessment/Plan   Diagnoses and all orders for this visit:  Vertigo  -     Referral to Physical Therapy; Future  Asymmetric SNHL (sensorineural hearing loss)  Tinnitus of left ear    Audiogram reviewed, recent MRI reviewed. Repeat audiogram in 1 year to monitor hearing.     The physiology  of balance control was explained. The likely possible etiologies were reviewed and the patient was thoroughly evaluated for BPPV, vestibular neuritis/labyrinthitis, vestibular hypofunction, Menieres Disease, and SCCD. I believe the patient does  have a peripheral vestibular disorder. I believe her dizziness if multifactorial. Her main vertigo does seem consistent with BPPV I recommend vestibular therapy. She sees someone through [facility] so will return this. I also recommended BFT, but patient had VNG 15 years ago and states she won't do that again. She will follow up with me after therapy.    All questions answered to patient satisfaction.        Tania Sabillon, APRN-CNP 07/17/25 11:35 AM        [1]   Patient Active Problem List  Diagnosis    Abnormal mammogram    Age-related nuclear cataract of both eyes    Allergic contact dermatitis due to other agents    Allergy    Angioedema    Anxiety    Edema of upper extremity    Leg swelling    Arthritis of carpometacarpal (CMC) joint of left thumb    Arthritis of knee, right    Atypical ductal hyperplasia of breast    Baker's cyst of knee    Carpal tunnel syndrome on left    Cellulitis    Cervicocranial syndrome    Chest pain    Chronic migraine without aura, intractable, without status migrainosus    Closed displaced spiral fracture of shaft of left humerus    Closed fracture of left distal humerus    Decreased ROM of left elbow    Decreased ROM of left shoulder    Dizziness    Vertigo    Dyspnea    Ear ringing    Tinnitus of left ear    Edema due to congestive heart failure    Extrinsic asthma without complication ([facility])    Chronic fatigue syndrome    Fatigue    GERD (gastroesophageal reflux disease)    Hemangioma    History of lumpectomy of right breast    Hypothyroidism    Imbalance    Injury of hand, right    Lesion of brain    Migraine with aura    Common migraine with intractable migraine    Localized primary osteoarthritis of right hand    Primary localized  osteoarthrosis of shoulder region    Metatarsal stress fracture    Nail fungus    Neuropathy of left radial nerve    Numbness and tingling in left hand    Obesity    Osteoporosis    Plantar fasciitis, left    PONV (postoperative nausea and vomiting)    Primary hypertension    Fracture of right humerus    Proximal humerus fracture    Rash    Ankle pain    Torn ACL    Urine frequency    Vitamin D deficiency    Asthma    Clawtoe, acquired    Phlebitis    Plantar plate injury    UTI symptoms    Vitreous detachment of right eye    Hand arthritis    Arthritis of knee, left    Carpal tunnel syndrome of left wrist    Feeling weak    Neck swelling    Chronic pain of right knee    Pain of left humerus    Migraine with aura and without status migrainosus, not intractable    Hypertension    Accidental fall    Ankylosis of left shoulder    Antinuclear antibody (EMILY) titer greater than 1:80    Autoimmune disease (Multi)    Autoimmune thyroiditis    Cervical spondylosis    History of pernicious anemia    Primary generalized hypertrophic osteoarthrosis    Primary osteoarthritis of knees, bilateral    Sjogren syndrome with keratoconjunctivitis    Systemic lupus erythematosus (Multi)    Primary osteoarthritis of right knee    Primary osteoarthritis, left hand    Tenosynovitis of right hand    Fibromyalgia syndrome    Unsteady gait    Claudication    Cellulitis and abscess of lower extremity    Arm swelling    Obesity, morbid (Multi)    Medication management    Post-menopausal    Pes anserinus bursitis of left knee    Endocarditis and heart valve disorders in diseases classified elsewhere    Renal insufficiency    HAYS (dyspnea on exertion)    Acute infectious disease    Cough    Infection of ear    Fever    Headache    Flushing    Pain of foot    Pain of right lower extremity    Arthralgia of knee    Pain of left shoulder region    Pain of upper extremity    Overactive bladder    Urinary tract infection    Daily nausea    Neuropathic  pain    Non-pressure chronic ulcer of lower leg, limited to breakdown of skin, unspecified laterality    Venous stasis ulcer, unspecified site, unspecified ulcer stage, unspecified whether varicose veins present (Multi)    Ankylosing spondylitis of multiple sites in spine (Multi)    Ankylosing spondylitis, unspecified site of spine (Multi)    Benign neoplasm of meninges, unspecified    Body mass index (BMI) 40.0-44.9, adult (Multi)    Chronic kidney disease, stage 3a (Multi)    Stage 3 chronic kidney disease, unspecified whether stage 3a or 3b CKD (Multi)    Severe persistent extrinsic asthma without complication (Multi)    Chronic obstructive pulmonary disease with (acute) exacerbation (Multi)    Pulmonary fibrosis, unspecified (Multi)    Olfactory hallucinations   [2]   Past Surgical History:  Procedure Laterality Date    BREAST LUMPECTOMY      KNEE SURGERY  09/23/2013    Knee Surgery    MR HEAD ANGIO WO IV CONTRAST  09/08/2021    MR HEAD ANGIO WO IV CONTRAST [facility]    OTHER SURGICAL HISTORY  09/23/2013    Biopsy Tongue    OTHER SURGICAL HISTORY  09/23/2013    Gynecologic Laparoscopy With Adhesiolysis    TONSILLECTOMY  02/09/2015    Tonsillectomy

## 2025-07-17 NOTE — PROGRESS NOTES
"AUDIOLOGY ADULT AUDIOMETRIC EVALUATION      Name:  Gretta Wilks \"Dixie\"  :  1950  Age:  75 y.o.  Date of Evaluation:  2025    HISTORY  Reason for visit:  ear infections  Ms. Wilks is seen 2025 at the request of CHIQUITA Duque FNP-C for an evaluation of hearing.      Chief complaint:    Since 2024, has been experiencing ear eczema, sensations of ears being blocked, ear pain, itching and dizziness (when she lays down, she experiences vertigo; notices visual disturbance \"like flipping pages\"; dizziness reportedly triggers migraine)  - sensation of echo for own voice    - chronic ear canal eczema, psoriasis  - mycotic otitis externa    Hearing loss:   patient reports hearing stable since previous audiogram of 10/9/2023  - previous audiogram of 10/9/2023 showed bilateral sensorineural hearing loss   Tinnitus:   left tinnitus since head injury in 2019; worse with migraine  Otitis Media: no ear infections prior to 2025  Otologic surgical history:  denies  Dizziness/imbalance:  yes  Otalgia:  denies  Ear pressure/fullness:  bilateral, left worse than right  History of excessive noise exposure:  denies  Other: psoriatic arthritis  - autoimmune issues    Hearing aid history: none          EVALUATION  Please find audiogram in \"Media\" tab (Document Type:  Audiology Report) or included at the bottom of this note.    RESULTS   Otoscopic Evaluation: non-occlusive cerumen bilaterally     Immittance Measures (226 Hz probe tone):   Tympanometry is consistent with normal middle ear pressure and normal tympanic membrane mobility bilaterally.       Ipsilateral acoustic reflexes (500-4000 Hz) are present for 500-2000 Hz bilaterally.    Test technique:  standard behavioral technique via insert earphones.  Reliability is good.    Pure Tone Audiometry:  Hearing sensitivity is in the mild to moderately-severe hearing loss range bilaterally.  Hearing loss is sensorineural     Note asymmetry for 2000 " Hz (left worse than right)    Speech Audiometry:        Right Ear:  Speech Reception Threshold (SRT) was obtained at 40 dBHL                 Speech discrimination score was 84% in quiet when words were presented at 80 dBHL      Left Ear:  Speech Reception Threshold (SRT) was obtained at 40 dBHL                 Speech discrimination score was 84% in quiet when words were presented at 80 dBHL    IMPRESSIONS:  In comparison with previous audiogram of 7/17/2025, there has been worsening of right hearing for  250 Hz and of left hearing for 6852-1274 Hz.      Patient is expected to have communication difficulty in adverse listening environments.    Patient is expected to benefit from devices that provide amplification (e.g., hearing aids) and improve the desired sound signal over that of background noise as well as from effective communication strategies.      RECOMMENDATIONS  Continue with medical follow-up with CHIQUITA Duque FNP-C.  Hearing Aid Evaluation with an audiologist to discuss hearing technology (such as hearing aids) and services.  Reassess hearing in 1 year (or sooner if medically indicated or if there is a concern for a change in hearing).    Continue with medical follow-up as indicated.      PATIENT EDUCATION  Discussed results and recommendations with patient.  Questions were addressed and the patient was encouraged to contact our department should concerns arise.       MYKEL Baxter, CCC-A  Licensed Audiologist

## 2025-07-23 ENCOUNTER — TELEPHONE (OUTPATIENT)
Dept: NEUROLOGY | Facility: CLINIC | Age: 75
End: 2025-07-23
Payer: MEDICARE

## 2025-07-23 NOTE — TELEPHONE ENCOUNTER
Left voicemail for patient to return call to confirm which appt she wants to keep.  Has appt on 7/30 to discuss test results but it looks like she was already seen for that.  Has two 6 month appts in the future on 10/29 and 11/3.  Confirm which one she would like to keep?

## 2025-07-30 ENCOUNTER — APPOINTMENT (OUTPATIENT)
Dept: NEUROLOGY | Facility: CLINIC | Age: 75
End: 2025-07-30
Payer: COMMERCIAL

## 2025-07-30 ENCOUNTER — TELEPHONE (OUTPATIENT)
Dept: NEUROLOGY | Facility: CLINIC | Age: 75
End: 2025-07-30

## 2025-07-30 ENCOUNTER — OFFICE VISIT (OUTPATIENT)
Dept: PRIMARY CARE | Facility: CLINIC | Age: 75
End: 2025-07-30
Payer: MEDICARE

## 2025-07-30 VITALS
SYSTOLIC BLOOD PRESSURE: 128 MMHG | BODY MASS INDEX: 39.49 KG/M2 | HEIGHT: 65 IN | WEIGHT: 237 LBS | DIASTOLIC BLOOD PRESSURE: 70 MMHG

## 2025-07-30 VITALS
BODY MASS INDEX: 39.32 KG/M2 | SYSTOLIC BLOOD PRESSURE: 112 MMHG | DIASTOLIC BLOOD PRESSURE: 58 MMHG | HEIGHT: 65 IN | HEART RATE: 57 BPM | WEIGHT: 236 LBS

## 2025-07-30 DIAGNOSIS — J01.90 ACUTE SINUSITIS, RECURRENCE NOT SPECIFIED, UNSPECIFIED LOCATION: Primary | ICD-10-CM

## 2025-07-30 DIAGNOSIS — Z13.220 LIPID SCREENING: ICD-10-CM

## 2025-07-30 DIAGNOSIS — I10 PRIMARY HYPERTENSION: ICD-10-CM

## 2025-07-30 DIAGNOSIS — E78.00 HIGH CHOLESTEROL: ICD-10-CM

## 2025-07-30 DIAGNOSIS — R42 DIZZINESS: ICD-10-CM

## 2025-07-30 DIAGNOSIS — H66.90 EAR INFECTION: ICD-10-CM

## 2025-07-30 DIAGNOSIS — G43.109 MIGRAINE WITH AURA AND WITHOUT STATUS MIGRAINOSUS, NOT INTRACTABLE: Primary | ICD-10-CM

## 2025-07-30 DIAGNOSIS — R09.82 POSTNASAL DRIP: ICD-10-CM

## 2025-07-30 DIAGNOSIS — R09.81 SINUS CONGESTION: ICD-10-CM

## 2025-07-30 DIAGNOSIS — G43.909 MIGRAINE WITHOUT STATUS MIGRAINOSUS, NOT INTRACTABLE, UNSPECIFIED MIGRAINE TYPE: ICD-10-CM

## 2025-07-30 DIAGNOSIS — E03.9 HYPOTHYROIDISM, UNSPECIFIED TYPE: ICD-10-CM

## 2025-07-30 DIAGNOSIS — Z13.1 DIABETES MELLITUS SCREENING: ICD-10-CM

## 2025-07-30 PROCEDURE — 99214 OFFICE O/P EST MOD 30 MIN: CPT | Performed by: INTERNAL MEDICINE

## 2025-07-30 PROCEDURE — 3078F DIAST BP <80 MM HG: CPT | Performed by: INTERNAL MEDICINE

## 2025-07-30 PROCEDURE — 3008F BODY MASS INDEX DOCD: CPT | Performed by: PSYCHIATRY & NEUROLOGY

## 2025-07-30 PROCEDURE — 3074F SYST BP LT 130 MM HG: CPT | Performed by: INTERNAL MEDICINE

## 2025-07-30 PROCEDURE — 1159F MED LIST DOCD IN RCRD: CPT | Performed by: INTERNAL MEDICINE

## 2025-07-30 PROCEDURE — G2211 COMPLEX E/M VISIT ADD ON: HCPCS | Performed by: INTERNAL MEDICINE

## 2025-07-30 PROCEDURE — 3078F DIAST BP <80 MM HG: CPT | Performed by: PSYCHIATRY & NEUROLOGY

## 2025-07-30 PROCEDURE — 3074F SYST BP LT 130 MM HG: CPT | Performed by: PSYCHIATRY & NEUROLOGY

## 2025-07-30 PROCEDURE — 3008F BODY MASS INDEX DOCD: CPT | Performed by: INTERNAL MEDICINE

## 2025-07-30 PROCEDURE — 99215 OFFICE O/P EST HI 40 MIN: CPT | Performed by: PSYCHIATRY & NEUROLOGY

## 2025-07-30 PROCEDURE — 1159F MED LIST DOCD IN RCRD: CPT | Performed by: PSYCHIATRY & NEUROLOGY

## 2025-07-30 PROCEDURE — 1036F TOBACCO NON-USER: CPT | Performed by: PSYCHIATRY & NEUROLOGY

## 2025-07-30 RX ORDER — SCOPOLAMINE 1 MG/3D
1 PATCH, EXTENDED RELEASE TRANSDERMAL
Qty: 10 PATCH | Refills: 0 | Status: SHIPPED | OUTPATIENT
Start: 2025-07-30 | End: 2025-09-28

## 2025-07-30 RX ORDER — LORATADINE 10 MG/1
10 TABLET ORAL DAILY
Qty: 30 TABLET | Refills: 0 | Status: SHIPPED | OUTPATIENT
Start: 2025-07-30 | End: 2025-10-28

## 2025-07-30 RX ORDER — DOXYCYCLINE 100 MG/1
100 CAPSULE ORAL 2 TIMES DAILY
Qty: 20 CAPSULE | Refills: 0 | Status: SHIPPED | OUTPATIENT
Start: 2025-07-30 | End: 2025-08-09

## 2025-07-30 RX ORDER — GUAIFENESIN 100 MG/5ML
200 LIQUID ORAL 3 TIMES DAILY PRN
Qty: 120 ML | Refills: 0 | Status: SHIPPED | OUTPATIENT
Start: 2025-07-30 | End: 2025-08-09

## 2025-07-30 NOTE — PATIENT INSTRUCTIONS
I will look into getting one of the injectable migraine medications- will research your chart, continue your current medications.

## 2025-07-30 NOTE — PROGRESS NOTES
"Subjective   Patient ID: Dixie Wilks is a 75 y.o. female who presents for Headache.    This young lady today came here for multiple medical issues.  1. She has severe headache.  She went to the emergency room, somewhat under controlled.  She is dizzy, on and off.  2. Sinus infection, postnasal congestion going on for the last several days.  She came here for follow-up.    I have personally reviewed the patient's Past Medical History, Medications, Allergies, Social History, and Family History in the EMR.    Review of Systems   All other systems reviewed and are negative.    Objective   /70   Ht 1.651 m (5' 5\")   Wt 108 kg (237 lb)   BMI 39.44 kg/m²     Physical Exam  Vitals reviewed.   HENT:      Nose: Congestion present.      Comments: Postnasal drip.     Mouth/Throat:      Comments: Throat congested    Cardiovascular:      Heart sounds: Normal heart sounds, S1 normal and S2 normal. No murmur heard.     No friction rub.   Pulmonary:      Effort: Pulmonary effort is normal.      Breath sounds: Wheezing (Bilateral) present.   Abdominal:      Palpations: There is no hepatomegaly, splenomegaly or mass.     Musculoskeletal:      Right lower leg: No edema.      Left lower leg: No edema.   Lymphadenopathy:      Lower Body: No right inguinal adenopathy. No left inguinal adenopathy.     Neurological:      Cranial Nerves: Cranial nerves 2-12 are intact.      Sensory: No sensory deficit.      Motor: Motor function is intact.      Deep Tendon Reflexes: Reflexes are normal and symmetric.     LAB WORK:  Laboratory testing discussed.    Assessment/Plan   Problem List Items Addressed This Visit           ICD-10-CM    Dizziness R42    Hypothyroidism E03.9    Relevant Orders    Thyroid Stimulating Hormone    Primary hypertension I10    Relevant Orders    CBC     Other Visit Diagnoses         Codes      Acute sinusitis, recurrence not specified, unspecified location    -  Primary J01.90      Ear infection     H66.90    " Relevant Medications    loratadine (Claritin) 10 mg tablet      Sinus congestion     R09.81    Relevant Medications    doxycycline (Vibramycin) 100 mg capsule    guaiFENesin (Robitussin) 100 mg/5 mL syrup    scopolamine (Transderm-Scop) 1 mg over 3 days patch 3 day      Lipid screening     Z13.220    Relevant Orders    Comprehensive Metabolic Panel      Diabetes mellitus screening     Z13.1    Relevant Orders    Hemoglobin A1C      Postnasal drip     R09.82      High cholesterol     E78.00      Migraine without status migrainosus, not intractable, unspecified migraine type     G43.909        1. Acute sinusitis, postnasal drip.  Doxycycline, Claritin, Robitussin, and Flonase.  2. Dizziness.  Scopolamine patch given.  3. Hypertension, okay.  4. High cholesterol, okay.  5. Blood work ordered.  6. Status post migraine treatment.  Somehow things are okay.  She will see neurologist.  7. Do the work-up and I will see her in a week after the test.    Mario Attestation  By signing my name below, ILeidy Scribe attest that this documentation has been prepared under the direction and in the presence of Yarelis Blanco MD.     All medical record entries made by the mario were personally dictated by me I have reviewed the chart and agree the record accurately reflects my personal performance of his history physical examination and management

## 2025-07-30 NOTE — PROGRESS NOTES
Subjective   Dixie iWlks is a 75 y.o. right-handed female.  HPI  This is a 75 year old woman with migraine sometimes associated with visual aura, last seen in 5/2025.  Her migraines are preceded with yawning, and severe left sided migraine.  On July 8th, she was treated in the ED at [facility], started with a visual distortion of her brick walls, she felt like she was falling, dizziness, then had a very severe left head pain, took Fioricet, Zofran, but could not walk- loss of balance.  She received IV fluid with Decadron, Valium, Reglan, and magnesium.  Her HA symptoms improved, her head CT without contrast  was negative, and discharged.  Her migraines are more frequent, still respond to Fioricet, and uses Zofran for nausea.                  Objective   Neurological Exam  Alert and pleasant older woman, interactive and talkative.  Physical Exam  I personally reviewed laboratory, radiographic, and medical studies which were pertinent for nothing.    Assessment/Plan

## 2025-07-30 NOTE — TELEPHONE ENCOUNTER
Pt called regarding appts; will keep today's appt and wants to cancel the 11/3 appt.    Tel# [phone]

## 2025-08-01 ENCOUNTER — CLINICAL SUPPORT (OUTPATIENT)
Dept: PHYSICAL THERAPY | Facility: CLINIC | Age: 75
End: 2025-08-01
Payer: MEDICARE

## 2025-08-01 DIAGNOSIS — R42 DIZZINESS: ICD-10-CM

## 2025-08-01 DIAGNOSIS — H81.12 BPPV (BENIGN PAROXYSMAL POSITIONAL VERTIGO), LEFT: Primary | ICD-10-CM

## 2025-08-01 PROCEDURE — 95992 CANALITH REPOSITIONING PROC: CPT | Mod: GP | Performed by: PHYSICAL THERAPIST

## 2025-08-01 PROCEDURE — 97162 PT EVAL MOD COMPLEX 30 MIN: CPT | Mod: GP | Performed by: PHYSICAL THERAPIST

## 2025-08-01 NOTE — PROGRESS NOTES
"Physical Therapy Evaluation    Patient Name: Gretta Wilks \"Dixie\"  MRN: [id number]  Evaluation Date: 8/1/2025  Time Calculation  Start Time: 0945  Stop Time: 1025  Time Calculation (min): 40 min    Current Problem  Problem List Items Addressed This Visit           ICD-10-CM    Dizziness R42    Relevant Orders    Follow Up In Physical Therapy    BPPV (benign paroxysmal positional vertigo), left - Primary H81.12    Relevant Orders    Follow Up In Physical Therapy     07/31/2025 NO AUTH/ MEDICARE A/B, [employer]/ [place] ( $0 USED PT/ST)    Subjective   General:       Patient reported hx of injury: chronic dizziness and migraine since a bad fall in 2019.  Recently had a blocked L ear to the point of increased dizziness, suction abolished the increased dizziness, in the last month or so is with positional dizziness turning into a migraine,  but also gets this seated at rest.  Was sitting at home still, and got dizzy and then migraine ( seated dizzy to migraine has happened 3x in last 5 weeks). Recent trip to the ED.  Has used meclizine, steroids, valium, magnesium to help these s/s.  Did have eeg and MRI in June and were OK-she was having phantom grape juice smell, now metallic smell.  Recent CT was also -.  Another episode sitting while she [place] in [place].   3rd seated was when she was going to bed-sitting on EOB,  and migraine again quickly after.  Dizzy with roll in bed.  Dizzy with supine to sit.  Dizzy sit to spine. Dizzy in busy environments chronically.  Has been dizzy with migraine many times in the past but less intense.  Usually gets Migraine 4-5x/month but now 10/month and at times more severe.   Pt has had PT for balance -helps a little but keeps having to go back, states no VOR or SP exercises with this.  Wants to go back there but that PT is booked and cannot get in, not 100% sure if he tx BPPV.  Has done some Breathing exercises.  Also has a PT who works on her hip.    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  For fall risk detail see completed STEADI under Episode section of EMR  Pain:   HA 8  Neck 5  Back 0  Home Living:     Pt gets around home safely  Lives with: Spouse    Work:  retired    Prior Function Per Pt/Caregiver Report:     Pt goals: less dizziness      OBJECTIVE:  If left blank, assume NT:       Range of Motion:     C-spine WFL for DHP/Epley      Hip PROM R L   Flexion     Extension     Abduction     Adduction     External Rotation     Internal Rotation       Knee PROM R L   Flexion     Extension       Ankle PROM R L   Dorsiflexion     Plantarflexion     Eversion     Inversion         Strength:     LE MMT R L   Hip flexion     Hip extension     Hip abduction     Hip adduction     Knee flexion     Knee extension     Ankle DF     Ankle PF     Ankle eversion     Ankle inversion       Flexibility:       Flexibility R L   Hamstring     Quad     Hip flexor       Palpation:     Special Tests:     Gait:   Mild gait deviations, decr evans  Balance:     Stairs:     Bed Mobility:   Min a with Epley  Transfers:   I  Other:       Outcome Measures:  DHI = 32    OP EDUCATION:     Pt ed on PT POC and agrees    HEP:    Today's Treatment:  Canalith repo 10'    Assessment       pt is s/p acute incr in severity and frequency of dizziness and migraine on top of chronic dizziness and migraine and needs PT to improve this to baseline.  Possible incr in migraine due to anxiety more likely that C-spine origin.      Based on the history including personal factors and/or comorbidities, examination of body systems including body structures and function, activity limitations, and/or participation restrictions, as well as clinical presentation, patient meets criteria for a mod complexity evaluation.    Plan     Next session re-test L DHP with Epley as needed, possible Roll tests, possible  "orthostatics, test MCTSIB and examine gait more closely, test B ankle coordination and oculo motor tests as needed.  May eventually need to look more at C-spine.    Skilled PT consisting of:  therapeutic exercise, therapeutic activity, NMR, manual, thermal, electric stimulation, US, light therapy, gait training, transfer training.  Mechanical traction PRN and only if OK by PT, canalith repositioning  Rehab Potential: good  Frequency:  2x/wk  Duration:  20 weeks  Medicare cert date:  8/1/25 - 10/26/25    Goals:    STG: in 5 visits   Pt to be I in initial HEP.    LTG\"s:  in 10-15 visits  - all tests for BPPV for decr dizziness  Improve score on outcome form by 10 points to show incr in function.  Decr frequency of migraine and dizziness bouts to baseline  MCTSIB 3-4/4 for incr sensory integration.      Some of This note was created using voice recognition software and was not corrected for typographical or grammatical errors.   "

## 2025-08-08 DIAGNOSIS — G43.009 MIGRAINE WITHOUT AURA AND WITHOUT STATUS MIGRAINOSUS, NOT INTRACTABLE: ICD-10-CM

## 2025-08-08 DIAGNOSIS — G43.109 MIGRAINE WITH AURA AND WITHOUT STATUS MIGRAINOSUS, NOT INTRACTABLE: ICD-10-CM

## 2025-08-11 RX ORDER — BUTALBITAL, ACETAMINOPHEN AND CAFFEINE 50; 325; 40 MG/1; MG/1; MG/1
2 TABLET ORAL DAILY PRN
Qty: 20 TABLET | Refills: 0 | Status: SHIPPED | OUTPATIENT
Start: 2025-08-11

## 2025-08-15 DIAGNOSIS — E03.9 HYPOTHYROIDISM, UNSPECIFIED TYPE: ICD-10-CM

## 2025-08-15 RX ORDER — LEVOTHYROXINE SODIUM 125 UG/1
125 TABLET ORAL DAILY
Qty: 90 TABLET | Refills: 3 | Status: SHIPPED | OUTPATIENT
Start: 2025-08-15 | End: 2026-08-15

## 2025-08-18 ENCOUNTER — APPOINTMENT (OUTPATIENT)
Dept: OTOLARYNGOLOGY | Facility: CLINIC | Age: 75
End: 2025-08-18
Payer: MEDICARE

## 2025-08-20 LAB
ALBUMIN SERPL-MCNC: 4 G/DL (ref 3.6–5.1)
ALP SERPL-CCNC: 55 U/L (ref 37–153)
ALT SERPL-CCNC: 13 U/L (ref 6–29)
ANION GAP SERPL CALCULATED.4IONS-SCNC: 9 MMOL/L (CALC) (ref 7–17)
AST SERPL-CCNC: 16 U/L (ref 10–35)
BILIRUB SERPL-MCNC: 0.6 MG/DL (ref 0.2–1.2)
BUN SERPL-MCNC: 12 MG/DL (ref 7–25)
CALCIUM SERPL-MCNC: 9.2 MG/DL (ref 8.6–10.4)
CHLORIDE SERPL-SCNC: 103 MMOL/L (ref 98–110)
CO2 SERPL-SCNC: 25 MMOL/L (ref 20–32)
CREAT SERPL-MCNC: 1.12 MG/DL (ref 0.6–1)
EGFRCR SERPLBLD CKD-EPI 2021: 52 ML/MIN/1.73M2
ERYTHROCYTE [DISTWIDTH] IN BLOOD BY AUTOMATED COUNT: 12.1 % (ref 11–15)
EST. AVERAGE GLUCOSE BLD GHB EST-MCNC: 117 MG/DL
EST. AVERAGE GLUCOSE BLD GHB EST-SCNC: 6.5 MMOL/L
GLUCOSE SERPL-MCNC: 107 MG/DL (ref 65–99)
HBA1C MFR BLD: 5.7 %
HCT VFR BLD AUTO: 41.2 % (ref 35–45)
HGB BLD-MCNC: 13.5 G/DL (ref 11.7–15.5)
MCH RBC QN AUTO: 30.3 PG (ref 27–33)
MCHC RBC AUTO-ENTMCNC: 32.8 G/DL (ref 32–36)
MCV RBC AUTO: 92.6 FL (ref 80–100)
PLATELET # BLD AUTO: 354 THOUSAND/UL (ref 140–400)
PMV BLD REES-ECKER: 10.2 FL (ref 7.5–12.5)
POTASSIUM SERPL-SCNC: 4.3 MMOL/L (ref 3.5–5.3)
PROT SERPL-MCNC: 7.2 G/DL (ref 6.1–8.1)
RBC # BLD AUTO: 4.45 MILLION/UL (ref 3.8–5.1)
SODIUM SERPL-SCNC: 137 MMOL/L (ref 135–146)
TSH SERPL-ACNC: 1.31 MIU/L (ref 0.4–4.5)
WBC # BLD AUTO: 8.7 THOUSAND/UL (ref 3.8–10.8)

## 2025-09-03 ENCOUNTER — DOCUMENTATION (OUTPATIENT)
Dept: PHYSICAL THERAPY | Facility: CLINIC | Age: 75
End: 2025-09-03

## 2025-09-03 ENCOUNTER — OFFICE VISIT (OUTPATIENT)
Dept: ORTHOPEDIC SURGERY | Facility: CLINIC | Age: 75
End: 2025-09-03
Payer: MEDICARE

## 2025-09-03 DIAGNOSIS — M17.12 ARTHRITIS OF LEFT KNEE: Primary | ICD-10-CM

## 2025-09-03 PROCEDURE — 20610 DRAIN/INJ JOINT/BURSA W/O US: CPT | Mod: LT

## 2025-09-03 PROCEDURE — 1159F MED LIST DOCD IN RCRD: CPT

## 2025-09-03 PROCEDURE — 99212 OFFICE O/P EST SF 10 MIN: CPT | Mod: 25

## 2025-09-03 PROCEDURE — 99214 OFFICE O/P EST MOD 30 MIN: CPT

## 2025-09-03 PROCEDURE — 1036F TOBACCO NON-USER: CPT

## 2025-09-03 PROCEDURE — 2500000004 HC RX 250 GENERAL PHARMACY W/ HCPCS (ALT 636 FOR OP/ED)

## 2025-09-03 PROCEDURE — 1125F AMNT PAIN NOTED PAIN PRSNT: CPT

## 2025-09-03 RX ORDER — TRIAMCINOLONE ACETONIDE 40 MG/ML
1 INJECTION, SUSPENSION INTRA-ARTICULAR; INTRAMUSCULAR
Status: COMPLETED | OUTPATIENT
Start: 2025-09-03 | End: 2025-09-03

## 2025-09-03 RX ADMIN — TRIAMCINOLONE ACETONIDE 1 ML: 40 INJECTION, SUSPENSION INTRA-ARTICULAR; INTRAMUSCULAR at 10:24

## 2025-09-03 ASSESSMENT — PATIENT HEALTH QUESTIONNAIRE - PHQ9
SUM OF ALL RESPONSES TO PHQ9 QUESTIONS 1 AND 2: 0
1. LITTLE INTEREST OR PLEASURE IN DOING THINGS: NOT AT ALL
2. FEELING DOWN, DEPRESSED OR HOPELESS: NOT AT ALL

## 2025-09-03 ASSESSMENT — PAIN SCALES - GENERAL: PAINLEVEL_OUTOF10: 9

## 2025-09-03 ASSESSMENT — COLUMBIA-SUICIDE SEVERITY RATING SCALE - C-SSRS
2. HAVE YOU ACTUALLY HAD ANY THOUGHTS OF KILLING YOURSELF?: NO
1. IN THE PAST MONTH, HAVE YOU WISHED YOU WERE DEAD OR WISHED YOU COULD GO TO SLEEP AND NOT WAKE UP?: NO
6. HAVE YOU EVER DONE ANYTHING, STARTED TO DO ANYTHING, OR PREPARED TO DO ANYTHING TO END YOUR LIFE?: NO

## 2025-09-03 ASSESSMENT — PAIN - FUNCTIONAL ASSESSMENT: PAIN_FUNCTIONAL_ASSESSMENT: 0-10

## 2025-09-08 ENCOUNTER — APPOINTMENT (OUTPATIENT)
Dept: PRIMARY CARE | Facility: CLINIC | Age: 75
End: 2025-09-08
Payer: MEDICARE

## 2025-09-09 ENCOUNTER — APPOINTMENT (OUTPATIENT)
Dept: PRIMARY CARE | Facility: CLINIC | Age: 75
End: 2025-09-09
Payer: MEDICARE

## 2025-09-23 ENCOUNTER — APPOINTMENT (OUTPATIENT)
Dept: OTOLARYNGOLOGY | Facility: CLINIC | Age: 75
End: 2025-09-23
Payer: MEDICARE

## 2025-10-29 ENCOUNTER — APPOINTMENT (OUTPATIENT)
Dept: NEUROLOGY | Facility: CLINIC | Age: 75
End: 2025-10-29
Payer: COMMERCIAL

## 2025-11-03 ENCOUNTER — APPOINTMENT (OUTPATIENT)
Dept: NEUROLOGY | Facility: CLINIC | Age: 75
End: 2025-11-03
Payer: COMMERCIAL

## 2026-03-30 ENCOUNTER — APPOINTMENT (OUTPATIENT)
Dept: ENDOCRINOLOGY | Facility: CLINIC | Age: 76
End: 2026-03-30
Payer: MEDICARE